# Patient Record
Sex: FEMALE | Race: WHITE | NOT HISPANIC OR LATINO | Employment: OTHER | URBAN - METROPOLITAN AREA
[De-identification: names, ages, dates, MRNs, and addresses within clinical notes are randomized per-mention and may not be internally consistent; named-entity substitution may affect disease eponyms.]

---

## 2017-02-26 ENCOUNTER — CONVERSION ENCOUNTER (OUTPATIENT)
Dept: SURGERY | Facility: CLINIC | Age: 82
End: 2017-02-26

## 2017-03-08 ENCOUNTER — CONVERSION ENCOUNTER (OUTPATIENT)
Dept: SURGERY | Facility: CLINIC | Age: 82
End: 2017-03-08

## 2017-03-08 ENCOUNTER — HOSPITAL ENCOUNTER (OUTPATIENT)
Dept: URGENT CARE | Facility: CLINIC | Age: 82
Discharge: HOME | End: 2017-03-08
Attending: GENERAL PRACTICE | Admitting: GENERAL PRACTICE

## 2017-06-19 ENCOUNTER — CONVERSION ENCOUNTER (OUTPATIENT)
Dept: SURGERY | Facility: CLINIC | Age: 82
End: 2017-06-19

## 2017-06-30 ENCOUNTER — HOSPITAL ENCOUNTER (OUTPATIENT)
Dept: CARDIOLOGY | Facility: HOSPITAL | Age: 82
Discharge: HOME | End: 2017-06-30
Attending: INTERNAL MEDICINE | Admitting: INTERNAL MEDICINE

## 2017-07-28 ENCOUNTER — HOSPITAL ENCOUNTER (OUTPATIENT)
Dept: PREADMISSION TESTING | Facility: HOSPITAL | Age: 82
Discharge: HOME | End: 2017-07-28
Attending: INTERNAL MEDICINE | Admitting: INTERNAL MEDICINE

## 2017-07-28 LAB
ANION GAP SERPL CALC-SCNC: 15.3 MMOL/L (ref 10–20)
BASOPHILS # BLD AUTO: 0.1 10*3/UL (ref 0–0.2)
BASOPHILS NFR BLD AUTO: 1 % (ref 0–2)
BUN SERPL-MCNC: 24 MG/DL (ref 8–20)
BUN/CREAT SERPL: 30 (ref 5.4–26.2)
CALCIUM SERPL-MCNC: 9.5 MG/DL (ref 8.9–10.3)
CHLORIDE SERPL-SCNC: 105 MMOL/L (ref 101–111)
CONV CO2: 24 MMOL/L (ref 22–32)
CREAT UR-MCNC: 0.8 MG/DL (ref 0.4–1)
DIFFERENTIAL METHOD BLD: (no result)
EOSINOPHIL # BLD AUTO: 0.3 10*3/UL (ref 0–0.3)
EOSINOPHIL # BLD AUTO: 4 % (ref 0–3)
ERYTHROCYTE [DISTWIDTH] IN BLOOD BY AUTOMATED COUNT: 13.7 % (ref 11.5–14.5)
GLUCOSE SERPL-MCNC: 108 MG/DL (ref 65–99)
HCT VFR BLD AUTO: 42.5 % (ref 35–49)
HGB BLD-MCNC: 14.2 G/DL (ref 12–15)
INR PPP: 0.9
LYMPHOCYTES # BLD AUTO: 2.3 10*3/UL (ref 0.8–4.8)
LYMPHOCYTES NFR BLD AUTO: 29 % (ref 18–42)
MCH RBC QN AUTO: 31.8 PG (ref 26–32)
MCHC RBC AUTO-ENTMCNC: 33.5 G/DL (ref 32–36)
MCV RBC AUTO: 94.8 FL (ref 80–94)
MONOCYTES # BLD AUTO: 0.7 10*3/UL (ref 0.1–1.3)
MONOCYTES NFR BLD AUTO: 9 % (ref 2–11)
NEUTROPHILS # BLD AUTO: 4.4 10*3/UL (ref 2.3–8.6)
NEUTROPHILS NFR BLD AUTO: 57 % (ref 50–75)
NRBC BLD AUTO-RTO: 0 /100{WBCS}
NRBC/RBC NFR BLD MANUAL: 0 10*3/UL
PLATELET # BLD AUTO: 454 10*3/UL (ref 150–450)
PMV BLD AUTO: 6.7 FL (ref 7.4–10.4)
POTASSIUM SERPL-SCNC: 4.3 MMOL/L (ref 3.6–5.1)
PROTHROMBIN TIME: 9.7 SEC (ref 9.6–11.7)
RBC # BLD AUTO: 4.49 10*6/UL (ref 4–5.4)
SODIUM SERPL-SCNC: 140 MMOL/L (ref 136–144)
WBC # BLD AUTO: 7.7 10*3/UL (ref 4.5–11.5)

## 2017-09-01 ENCOUNTER — CONVERSION ENCOUNTER (OUTPATIENT)
Dept: SURGERY | Facility: CLINIC | Age: 82
End: 2017-09-01

## 2017-11-03 ENCOUNTER — HOSPITAL ENCOUNTER (OUTPATIENT)
Dept: LAB | Facility: HOSPITAL | Age: 82
Discharge: HOME | End: 2017-11-03
Attending: FAMILY MEDICINE | Admitting: FAMILY MEDICINE

## 2017-11-03 LAB
ALBUMIN SERPL-MCNC: 3.7 G/DL (ref 3.5–4.8)
ALBUMIN/GLOB SERPL: 1.4 {RATIO} (ref 1–1.7)
ALP SERPL-CCNC: 32 IU/L (ref 32–91)
ALT SERPL-CCNC: 25 IU/L (ref 14–54)
ANION GAP SERPL CALC-SCNC: 12.8 MMOL/L (ref 10–20)
AST SERPL-CCNC: 20 IU/L (ref 15–41)
BASOPHILS # BLD AUTO: 0 10*3/UL (ref 0–0.2)
BASOPHILS NFR BLD AUTO: 1 % (ref 0–2)
BILIRUB SERPL-MCNC: 0.8 MG/DL (ref 0.3–1.2)
BUN SERPL-MCNC: 20 MG/DL (ref 8–20)
BUN/CREAT SERPL: 25 (ref 5.4–26.2)
CALCIUM SERPL-MCNC: 8.9 MG/DL (ref 8.9–10.3)
CHLORIDE SERPL-SCNC: 102 MMOL/L (ref 101–111)
CHOLEST SERPL-MCNC: 151 MG/DL
CHOLEST/HDLC SERPL: 2.3 {RATIO}
CONV CO2: 26 MMOL/L (ref 22–32)
CONV LDL CHOLESTEROL DIRECT: 68 MG/DL (ref 0–100)
CONV MICROALBUM.,U,RANDOM: 2 MG/L
CONV TOTAL PROTEIN: 6.4 G/DL (ref 6.1–7.9)
CREAT 24H UR-MCNC: 96 MG/DL
CREAT UR-MCNC: 0.8 MG/DL (ref 0.4–1)
DIFFERENTIAL METHOD BLD: (no result)
EOSINOPHIL # BLD AUTO: 0.2 10*3/UL (ref 0–0.3)
EOSINOPHIL # BLD AUTO: 4 % (ref 0–3)
ERYTHROCYTE [DISTWIDTH] IN BLOOD BY AUTOMATED COUNT: 13.2 % (ref 11.5–14.5)
GLOBULIN UR ELPH-MCNC: 2.7 G/DL (ref 2.5–3.8)
GLUCOSE SERPL-MCNC: 119 MG/DL (ref 65–99)
HCT VFR BLD AUTO: 40 % (ref 35–49)
HDLC SERPL-MCNC: 66 MG/DL
HGB BLD-MCNC: 13.5 G/DL (ref 12–15)
LDLC/HDLC SERPL: 1 {RATIO}
LIPID INTERPRETATION: NORMAL
LYMPHOCYTES # BLD AUTO: 1.9 10*3/UL (ref 0.8–4.8)
LYMPHOCYTES NFR BLD AUTO: 35 % (ref 18–42)
MCH RBC QN AUTO: 32.4 PG (ref 26–32)
MCHC RBC AUTO-ENTMCNC: 33.8 G/DL (ref 32–36)
MCV RBC AUTO: 95.9 FL (ref 80–94)
MICROALBUMIN/CREAT UR: 2.1 UG/MG
MONOCYTES # BLD AUTO: 0.5 10*3/UL (ref 0.1–1.3)
MONOCYTES NFR BLD AUTO: 10 % (ref 2–11)
NEUTROPHILS # BLD AUTO: 2.7 10*3/UL (ref 2.3–8.6)
NEUTROPHILS NFR BLD AUTO: 50 % (ref 50–75)
NRBC BLD AUTO-RTO: 0 /100{WBCS}
NRBC/RBC NFR BLD MANUAL: 0 10*3/UL
PLATELET # BLD AUTO: 409 10*3/UL (ref 150–450)
PMV BLD AUTO: 7.1 FL (ref 7.4–10.4)
POTASSIUM SERPL-SCNC: 3.8 MMOL/L (ref 3.6–5.1)
RBC # BLD AUTO: 4.17 10*6/UL (ref 4–5.4)
SODIUM SERPL-SCNC: 137 MMOL/L (ref 136–144)
TRIGL SERPL-MCNC: 82 MG/DL
TSH SERPL-ACNC: 1.64 UIU/ML (ref 0.34–5.6)
VLDLC SERPL CALC-MCNC: 17.6 MG/DL
WBC # BLD AUTO: 5.3 10*3/UL (ref 4.5–11.5)

## 2017-11-10 ENCOUNTER — HOSPITAL ENCOUNTER (OUTPATIENT)
Dept: LAB | Facility: HOSPITAL | Age: 82
Setting detail: SPECIMEN
Discharge: HOME | End: 2017-11-10
Attending: FAMILY MEDICINE | Admitting: FAMILY MEDICINE

## 2017-11-10 LAB
BACTERIA SPEC AEROBE CULT: NORMAL
Lab: NORMAL
MICRO REPORT STATUS: NORMAL
SPECIMEN SOURCE: NORMAL

## 2018-01-05 ENCOUNTER — CONVERSION ENCOUNTER (OUTPATIENT)
Dept: SURGERY | Facility: CLINIC | Age: 83
End: 2018-01-05

## 2018-03-02 ENCOUNTER — CONVERSION ENCOUNTER (OUTPATIENT)
Dept: SURGERY | Facility: CLINIC | Age: 83
End: 2018-03-02

## 2018-04-29 ENCOUNTER — CONVERSION ENCOUNTER (OUTPATIENT)
Dept: SURGERY | Facility: CLINIC | Age: 83
End: 2018-04-29

## 2018-08-25 ENCOUNTER — HOSPITAL ENCOUNTER (OUTPATIENT)
Dept: OTHER | Facility: HOSPITAL | Age: 83
Discharge: HOME | End: 2018-08-25
Attending: INTERNAL MEDICINE | Admitting: INTERNAL MEDICINE

## 2018-08-27 ENCOUNTER — HOSPITAL ENCOUNTER (OUTPATIENT)
Dept: ONCOLOGY | Facility: CLINIC | Age: 83
Setting detail: INFUSION SERIES
Discharge: HOME | End: 2018-08-27
Attending: INTERNAL MEDICINE | Admitting: INTERNAL MEDICINE

## 2018-08-27 ENCOUNTER — HOSPITAL ENCOUNTER (OUTPATIENT)
Dept: ONCOLOGY | Facility: HOSPITAL | Age: 83
Discharge: HOME | End: 2018-08-27
Attending: INTERNAL MEDICINE | Admitting: INTERNAL MEDICINE

## 2018-08-27 ENCOUNTER — CLINICAL SUPPORT (OUTPATIENT)
Dept: ONCOLOGY | Facility: HOSPITAL | Age: 83
End: 2018-08-27

## 2018-08-27 LAB
ALBUMIN SERPL-MCNC: 3.9 G/DL (ref 3.5–4.8)
ALBUMIN/GLOB SERPL: 1.3 {RATIO} (ref 1–1.7)
ALP SERPL-CCNC: 45 IU/L (ref 32–91)
ALT SERPL-CCNC: 19 IU/L (ref 14–54)
ANION GAP SERPL CALC-SCNC: 15 MMOL/L (ref 10–20)
AST SERPL-CCNC: 19 IU/L (ref 15–41)
BILIRUB SERPL-MCNC: 0.5 MG/DL (ref 0.3–1.2)
BUN SERPL-MCNC: 20 MG/DL (ref 8–20)
BUN/CREAT SERPL: 22.2 (ref 5.4–26.2)
CALCIUM SERPL-MCNC: 9.7 MG/DL (ref 8.9–10.3)
CHLORIDE SERPL-SCNC: 102 MMOL/L (ref 101–111)
CONV CO2: 27 MMOL/L (ref 22–32)
CONV TOTAL PROTEIN: 6.8 G/DL (ref 6.1–7.9)
CREAT UR-MCNC: 0.9 MG/DL (ref 0.4–1)
GLOBULIN UR ELPH-MCNC: 2.9 G/DL (ref 2.5–3.8)
GLUCOSE SERPL-MCNC: 101 MG/DL (ref 65–99)
POTASSIUM SERPL-SCNC: 4 MMOL/L (ref 3.6–5.1)
SODIUM SERPL-SCNC: 140 MMOL/L (ref 136–144)

## 2018-08-27 NOTE — PROGRESS NOTES
PATIENTS ONCOLOGY RECORD LOCATED IN [facility]      Subjective     Name:  PORTIA JONES     Date:  2018  Address:  [facility] [address] [place] [address]  Home: [phone]  :  1935 AGE:  83 y.o.        RECORDS OBTAINED:  Patients Oncology Record is located in [facility]

## 2018-08-28 ENCOUNTER — CONVERSION ENCOUNTER (OUTPATIENT)
Dept: SURGERY | Facility: CLINIC | Age: 83
End: 2018-08-28

## 2018-08-29 ENCOUNTER — HOSPITAL ENCOUNTER (OUTPATIENT)
Dept: PREADMISSION TESTING | Facility: HOSPITAL | Age: 83
Discharge: HOME | End: 2018-08-29
Attending: THORACIC SURGERY (CARDIOTHORACIC VASCULAR SURGERY) | Admitting: THORACIC SURGERY (CARDIOTHORACIC VASCULAR SURGERY)

## 2018-08-29 LAB
ABO + RH BLD: NORMAL
ALBUMIN SERPL-MCNC: 3.7 G/DL (ref 3.5–4.8)
ALBUMIN/GLOB SERPL: 1.3 {RATIO} (ref 1–1.7)
ALP SERPL-CCNC: 44 IU/L (ref 32–91)
ALT SERPL-CCNC: 24 IU/L (ref 14–54)
ANION GAP SERPL CALC-SCNC: 14.1 MMOL/L (ref 10–20)
APTT BLD: 20.6 SEC (ref 24–31)
ARMBAND: NORMAL
AST SERPL-CCNC: 20 IU/L (ref 15–41)
BASOPHILS # BLD AUTO: 0.1 10*3/UL (ref 0–0.2)
BASOPHILS NFR BLD AUTO: 1 % (ref 0–2)
BILIRUB SERPL-MCNC: 0.7 MG/DL (ref 0.3–1.2)
BLD COMPONENT TYPE: NORMAL
BLD GP AB SCN SERPL QL: NEGATIVE
BPU ID: NORMAL
BPU ID: NORMAL
BUN SERPL-MCNC: 19 MG/DL (ref 8–20)
BUN/CREAT SERPL: 23.8 (ref 5.4–26.2)
CALCIUM SERPL-MCNC: 9.8 MG/DL (ref 8.9–10.3)
CHLORIDE SERPL-SCNC: 103 MMOL/L (ref 101–111)
CONV CO2: 27 MMOL/L (ref 22–32)
CONV PRODUCT 1 STATUS: NORMAL
CONV PRODUCT 1 STATUS: NORMAL
CONV TOTAL PROTEIN: 6.6 G/DL (ref 6.1–7.9)
CREAT UR-MCNC: 0.8 MG/DL (ref 0.4–1)
CROSSMATCH EXPIRATION: NORMAL
CROSSMATCH INTERPRETATION: NORMAL
CROSSMATCH INTERPRETATION: NORMAL
CROSSMATCH: NORMAL
DIFFERENTIAL METHOD BLD: (no result)
EOSINOPHIL # BLD AUTO: 0.2 10*3/UL (ref 0–0.3)
EOSINOPHIL # BLD AUTO: 3 % (ref 0–3)
ERYTHROCYTE [DISTWIDTH] IN BLOOD BY AUTOMATED COUNT: 13.8 % (ref 11.5–14.5)
GLOBULIN UR ELPH-MCNC: 2.9 G/DL (ref 2.5–3.8)
GLUCOSE SERPL-MCNC: 108 MG/DL (ref 65–99)
HCT VFR BLD AUTO: 41.2 % (ref 35–49)
HGB BLD-MCNC: 14 G/DL (ref 12–15)
INR PPP: 0.9
LYMPHOCYTES # BLD AUTO: 2.3 10*3/UL (ref 0.8–4.8)
LYMPHOCYTES NFR BLD AUTO: 27 % (ref 18–42)
MCH RBC QN AUTO: 32 PG (ref 26–32)
MCHC RBC AUTO-ENTMCNC: 33.9 G/DL (ref 32–36)
MCV RBC AUTO: 94.4 FL (ref 80–94)
MONOCYTES # BLD AUTO: 0.8 10*3/UL (ref 0.1–1.3)
MONOCYTES NFR BLD AUTO: 9 % (ref 2–11)
NEUTROPHILS # BLD AUTO: 5.2 10*3/UL (ref 2.3–8.6)
NEUTROPHILS NFR BLD AUTO: 60 % (ref 50–75)
NRBC BLD AUTO-RTO: 0 /100{WBCS}
NRBC/RBC NFR BLD MANUAL: 0 10*3/UL
NUM BPU REQUESTED: 2
PLATELET # BLD AUTO: 489 10*3/UL (ref 150–450)
PMV BLD AUTO: 6.7 FL (ref 7.4–10.4)
POTASSIUM SERPL-SCNC: 4.1 MMOL/L (ref 3.6–5.1)
PRE-HGB: 14 MG/DL
PROTHROMBIN TIME: 10.2 SEC (ref 9.6–11.7)
RBC # BLD AUTO: 4.36 10*6/UL (ref 4–5.4)
SODIUM SERPL-SCNC: 140 MMOL/L (ref 136–144)
TRANS STATUS: NORMAL
TRANS STATUS: NORMAL
UNIT DIVISION: 0
UNIT DIVISION: 0
WBC # BLD AUTO: 8.5 10*3/UL (ref 4.5–11.5)

## 2018-08-31 ENCOUNTER — CONVERSION ENCOUNTER (OUTPATIENT)
Dept: SURGERY | Facility: CLINIC | Age: 83
End: 2018-08-31

## 2018-09-12 ENCOUNTER — HOSPITAL ENCOUNTER (OUTPATIENT)
Dept: GENERAL RADIOLOGY | Facility: HOSPITAL | Age: 83
Discharge: HOME | End: 2018-09-12
Attending: THORACIC SURGERY (CARDIOTHORACIC VASCULAR SURGERY) | Admitting: THORACIC SURGERY (CARDIOTHORACIC VASCULAR SURGERY)

## 2018-09-13 ENCOUNTER — CONVERSION ENCOUNTER (OUTPATIENT)
Dept: SURGERY | Facility: CLINIC | Age: 83
End: 2018-09-13

## 2018-09-18 ENCOUNTER — HOSPITAL ENCOUNTER (OUTPATIENT)
Dept: ONCOLOGY | Facility: HOSPITAL | Age: 83
Discharge: HOME | End: 2018-09-18
Attending: INTERNAL MEDICINE | Admitting: INTERNAL MEDICINE

## 2018-09-18 ENCOUNTER — HOSPITAL ENCOUNTER (OUTPATIENT)
Dept: ONCOLOGY | Facility: CLINIC | Age: 83
Setting detail: INFUSION SERIES
Discharge: HOME | End: 2018-09-18
Attending: INTERNAL MEDICINE | Admitting: INTERNAL MEDICINE

## 2018-09-18 ENCOUNTER — CLINICAL SUPPORT (OUTPATIENT)
Dept: ONCOLOGY | Facility: HOSPITAL | Age: 83
End: 2018-09-18

## 2018-09-18 LAB — ERYTHROCYTE [SEDIMENTATION RATE] IN BLOOD BY WESTERGREN METHOD: 33 MM/HR (ref 0–30)

## 2018-09-18 NOTE — PROGRESS NOTES
PATIENTS ONCOLOGY RECORD LOCATED IN [facility]      Subjective     Name:  PORTIA JONES     Date:  2018  Address:  [facility] [address] [place] [address]  Home: [phone]  :  1935 AGE:  83 y.o.        RECORDS OBTAINED:  Patients Oncology Record is located in [facility]

## 2018-10-01 ENCOUNTER — CONVERSION ENCOUNTER (OUTPATIENT)
Dept: SURGERY | Facility: CLINIC | Age: 83
End: 2018-10-01

## 2018-10-01 ENCOUNTER — HOSPITAL ENCOUNTER (OUTPATIENT)
Dept: URGENT CARE | Facility: CLINIC | Age: 83
Setting detail: SPECIMEN
Discharge: HOME | End: 2018-10-01
Attending: FAMILY MEDICINE | Admitting: FAMILY MEDICINE

## 2018-10-01 LAB
AZTREONAM SUSC ISLT: ABNORMAL
BACTERIA ISLT: ABNORMAL
BACTERIA SPEC AEROBE CULT: ABNORMAL
CEFAZOLIN SUSC ISLT: ABNORMAL
CEFEPIME SUSC ISLT: ABNORMAL
CEFTRIAXONE SUSC ISLT: ABNORMAL
CIPROFLOXACIN SUSC ISLT: ABNORMAL
COLONY COUNT: ABNORMAL
LEVOFLOXACIN SUSC ISLT: ABNORMAL
Lab: ABNORMAL
MEROPENEM SUSC ISLT: ABNORMAL
MICRO REPORT STATUS: ABNORMAL
NITROFURANTOIN SUSC ISLT: ABNORMAL
PIP+TAZO SUSC ISLT: ABNORMAL
SPECIMEN SOURCE: ABNORMAL
SUSC METH SPEC: ABNORMAL
TETRACYCLINE SUSC ISLT: ABNORMAL
TOBRAMYCIN SUSC ISLT: ABNORMAL
TRIMETHOPRIM/SULFA: ABNORMAL

## 2018-10-11 ENCOUNTER — HOSPITAL ENCOUNTER (OUTPATIENT)
Dept: LAB | Facility: HOSPITAL | Age: 83
Setting detail: SPECIMEN
Discharge: HOME | End: 2018-10-11
Attending: FAMILY MEDICINE | Admitting: FAMILY MEDICINE

## 2018-10-11 LAB
BACTERIA SPEC AEROBE CULT: NORMAL
Lab: NORMAL
MICRO REPORT STATUS: NORMAL
SPECIMEN SOURCE: NORMAL

## 2018-11-01 ENCOUNTER — HOSPITAL ENCOUNTER (OUTPATIENT)
Dept: LAB | Facility: HOSPITAL | Age: 83
Setting detail: SPECIMEN
Discharge: HOME | End: 2018-11-01
Attending: FAMILY MEDICINE | Admitting: FAMILY MEDICINE

## 2018-11-01 LAB
BACTERIA SPEC AEROBE CULT: NORMAL
Lab: NORMAL
MICRO REPORT STATUS: NORMAL
SPECIMEN SOURCE: NORMAL

## 2018-11-14 ENCOUNTER — HOSPITAL ENCOUNTER (OUTPATIENT)
Dept: LAB | Facility: HOSPITAL | Age: 83
Setting detail: SPECIMEN
Discharge: HOME | End: 2018-11-14
Attending: FAMILY MEDICINE | Admitting: FAMILY MEDICINE

## 2018-11-14 LAB
AMPICILLIN SUSC ISLT: ABNORMAL
AZTREONAM SUSC ISLT: ABNORMAL
BACTERIA ISLT: ABNORMAL
BACTERIA SPEC AEROBE CULT: ABNORMAL
CEFAZOLIN SUSC ISLT: ABNORMAL
CEFEPIME SUSC ISLT: ABNORMAL
CEFTRIAXONE SUSC ISLT: ABNORMAL
CIPROFLOXACIN SUSC ISLT: ABNORMAL
COLONY COUNT: ABNORMAL
LEVOFLOXACIN SUSC ISLT: ABNORMAL
Lab: ABNORMAL
MEROPENEM SUSC ISLT: ABNORMAL
MICRO REPORT STATUS: ABNORMAL
NITROFURANTOIN SUSC ISLT: ABNORMAL
PIP+TAZO SUSC ISLT: ABNORMAL
SPECIMEN SOURCE: ABNORMAL
SUSC METH SPEC: ABNORMAL
TETRACYCLINE SUSC ISLT: ABNORMAL
TOBRAMYCIN SUSC ISLT: ABNORMAL
TRIMETHOPRIM/SULFA: ABNORMAL

## 2019-02-08 ENCOUNTER — CONVERSION ENCOUNTER (OUTPATIENT)
Dept: SURGERY | Facility: CLINIC | Age: 84
End: 2019-02-08

## 2019-02-21 ENCOUNTER — HOSPITAL ENCOUNTER (OUTPATIENT)
Dept: LAB | Facility: HOSPITAL | Age: 84
Discharge: HOME | End: 2019-02-21
Attending: INTERNAL MEDICINE | Admitting: INTERNAL MEDICINE

## 2019-02-21 LAB
ALBUMIN SERPL-MCNC: 3.5 G/DL (ref 3.5–4.8)
ALBUMIN/GLOB SERPL: 1.2 {RATIO} (ref 1–1.7)
ALP SERPL-CCNC: 54 IU/L (ref 32–91)
ALT SERPL-CCNC: 19 IU/L (ref 14–54)
ANION GAP SERPL CALC-SCNC: 16.9 MMOL/L (ref 10–20)
AST SERPL-CCNC: 17 IU/L (ref 15–41)
BILIRUB SERPL-MCNC: 0.7 MG/DL (ref 0.3–1.2)
BUN SERPL-MCNC: 19 MG/DL (ref 8–20)
BUN/CREAT SERPL: 23.8 (ref 5.4–26.2)
CALCIUM SERPL-MCNC: 9.2 MG/DL (ref 8.9–10.3)
CHLORIDE SERPL-SCNC: 99 MMOL/L (ref 101–111)
CHOLEST SERPL-MCNC: 174 MG/DL
CHOLEST/HDLC SERPL: 2.8 {RATIO}
CK SERPL-CCNC: 67 IU/L (ref 38–234)
CONV CO2: 26 MMOL/L (ref 22–32)
CONV LDL CHOLESTEROL DIRECT: 91 MG/DL (ref 0–100)
CONV TOTAL PROTEIN: 6.4 G/DL (ref 6.1–7.9)
CREAT UR-MCNC: 0.8 MG/DL (ref 0.4–1)
GLOBULIN UR ELPH-MCNC: 2.9 G/DL (ref 2.5–3.8)
GLUCOSE SERPL-MCNC: 125 MG/DL (ref 65–99)
HDLC SERPL-MCNC: 63 MG/DL
LDLC/HDLC SERPL: 1.4 {RATIO}
LIPID INTERPRETATION: NORMAL
POTASSIUM SERPL-SCNC: 3.9 MMOL/L (ref 3.6–5.1)
SODIUM SERPL-SCNC: 138 MMOL/L (ref 136–144)
TRIGL SERPL-MCNC: 82 MG/DL
VLDLC SERPL CALC-MCNC: 19.7 MG/DL

## 2019-03-01 ENCOUNTER — CONVERSION ENCOUNTER (OUTPATIENT)
Dept: SURGERY | Facility: CLINIC | Age: 84
End: 2019-03-01

## 2019-03-26 ENCOUNTER — CONVERSION ENCOUNTER (OUTPATIENT)
Dept: SURGERY | Facility: CLINIC | Age: 84
End: 2019-03-26

## 2019-06-04 VITALS
BODY MASS INDEX: 29.23 KG/M2 | DIASTOLIC BLOOD PRESSURE: 85 MMHG | WEIGHT: 164 LBS | WEIGHT: 169.2 LBS | HEIGHT: 63 IN | WEIGHT: 163 LBS | HEART RATE: 93 BPM | SYSTOLIC BLOOD PRESSURE: 135 MMHG | OXYGEN SATURATION: 97 % | HEIGHT: 63 IN | DIASTOLIC BLOOD PRESSURE: 78 MMHG | SYSTOLIC BLOOD PRESSURE: 143 MMHG | HEIGHT: 63 IN | BODY MASS INDEX: 28.88 KG/M2 | DIASTOLIC BLOOD PRESSURE: 78 MMHG | HEART RATE: 108 BPM | SYSTOLIC BLOOD PRESSURE: 168 MMHG | BODY MASS INDEX: 29.23 KG/M2 | BODY MASS INDEX: 29.06 KG/M2 | WEIGHT: 165 LBS | WEIGHT: 161 LBS | DIASTOLIC BLOOD PRESSURE: 77 MMHG | OXYGEN SATURATION: 97 % | BODY MASS INDEX: 29.32 KG/M2 | BODY MASS INDEX: 29.36 KG/M2 | RESPIRATION RATE: 24 BRPM | WEIGHT: 164 LBS | SYSTOLIC BLOOD PRESSURE: 178 MMHG | HEART RATE: 73 BPM | RESPIRATION RATE: 16 BRPM | WEIGHT: 165.5 LBS | HEIGHT: 63 IN | DIASTOLIC BLOOD PRESSURE: 80 MMHG | OXYGEN SATURATION: 99 % | OXYGEN SATURATION: 96 % | DIASTOLIC BLOOD PRESSURE: 77 MMHG | SYSTOLIC BLOOD PRESSURE: 153 MMHG | HEIGHT: 63 IN | BODY MASS INDEX: 29.84 KG/M2 | BODY MASS INDEX: 28.53 KG/M2 | HEART RATE: 116 BPM | HEART RATE: 106 BPM | SYSTOLIC BLOOD PRESSURE: 138 MMHG | RESPIRATION RATE: 20 BRPM | HEART RATE: 89 BPM | HEIGHT: 63 IN | DIASTOLIC BLOOD PRESSURE: 73 MMHG | SYSTOLIC BLOOD PRESSURE: 151 MMHG | SYSTOLIC BLOOD PRESSURE: 132 MMHG | OXYGEN SATURATION: 98 % | OXYGEN SATURATION: 97 % | OXYGEN SATURATION: 96 % | WEIGHT: 170 LBS | WEIGHT: 165 LBS | HEART RATE: 86 BPM | HEART RATE: 102 BPM | SYSTOLIC BLOOD PRESSURE: 179 MMHG | OXYGEN SATURATION: 97 % | HEART RATE: 82 BPM | SYSTOLIC BLOOD PRESSURE: 185 MMHG | OXYGEN SATURATION: 96 % | BODY MASS INDEX: 29.05 KG/M2 | OXYGEN SATURATION: 98 % | DIASTOLIC BLOOD PRESSURE: 78 MMHG | DIASTOLIC BLOOD PRESSURE: 79 MMHG | HEART RATE: 97 BPM | OXYGEN SATURATION: 97 % | DIASTOLIC BLOOD PRESSURE: 81 MMHG | WEIGHT: 165.75 LBS | SYSTOLIC BLOOD PRESSURE: 158 MMHG | WEIGHT: 168.44 LBS | SYSTOLIC BLOOD PRESSURE: 135 MMHG | RESPIRATION RATE: 16 BRPM | OXYGEN SATURATION: 98 % | WEIGHT: 166.5 LBS | DIASTOLIC BLOOD PRESSURE: 69 MMHG | DIASTOLIC BLOOD PRESSURE: 75 MMHG | HEART RATE: 107 BPM | WEIGHT: 169 LBS | WEIGHT: 165.2 LBS | DIASTOLIC BLOOD PRESSURE: 69 MMHG | HEART RATE: 83 BPM | HEART RATE: 93 BPM | SYSTOLIC BLOOD PRESSURE: 137 MMHG | DIASTOLIC BLOOD PRESSURE: 77 MMHG | BODY MASS INDEX: 29.49 KG/M2 | OXYGEN SATURATION: 98 % | HEART RATE: 103 BPM | SYSTOLIC BLOOD PRESSURE: 132 MMHG

## 2019-09-26 ENCOUNTER — OFFICE VISIT (OUTPATIENT)
Dept: SURGERY | Facility: CLINIC | Age: 84
End: 2019-09-26

## 2019-09-26 VITALS
DIASTOLIC BLOOD PRESSURE: 70 MMHG | SYSTOLIC BLOOD PRESSURE: 133 MMHG | TEMPERATURE: 97.8 F | WEIGHT: 166 LBS | OXYGEN SATURATION: 98 % | HEART RATE: 87 BPM | BODY MASS INDEX: 28.94 KG/M2

## 2019-09-26 DIAGNOSIS — C34.12 PRIMARY MALIGNANT NEOPLASM OF LEFT UPPER LOBE OF LUNG (HCC): Primary | ICD-10-CM

## 2019-09-26 PROBLEM — Z83.3 FAMILY HISTORY OF DIABETES MELLITUS: Status: ACTIVE | Noted: 2019-09-26

## 2019-09-26 PROBLEM — I47.1 PAROXYSMAL SUPRAVENTRICULAR TACHYCARDIA: Status: ACTIVE | Noted: 2019-03-01

## 2019-09-26 PROBLEM — D49.89 THYMOMA: Status: ACTIVE | Noted: 2018-09-13

## 2019-09-26 PROBLEM — N76.0 VAGINITIS: Status: ACTIVE | Noted: 2018-01-05

## 2019-09-26 PROBLEM — J45.909 ASTHMA: Status: ACTIVE | Noted: 2019-09-26

## 2019-09-26 PROBLEM — I47.10 PAROXYSMAL SUPRAVENTRICULAR TACHYCARDIA: Status: ACTIVE | Noted: 2019-03-01

## 2019-09-26 PROBLEM — E78.5 HYPERLIPIDEMIA: Status: ACTIVE | Noted: 2019-09-26

## 2019-09-26 PROBLEM — M19.90 ARTHRITIS: Status: ACTIVE | Noted: 2018-08-28

## 2019-09-26 PROBLEM — R00.1 BRADYCARDIA: Status: ACTIVE | Noted: 2019-03-01

## 2019-09-26 PROCEDURE — 99213 OFFICE O/P EST LOW 20 MIN: CPT | Performed by: THORACIC SURGERY (CARDIOTHORACIC VASCULAR SURGERY)

## 2019-09-26 RX ORDER — OMEGA-3 FATTY ACIDS/FISH OIL 684-1200MG
CAPSULE,DELAYED RELEASE (ENTERIC COATED) ORAL
COMMUNITY
Start: 2014-06-22 | End: 2021-05-10

## 2019-09-26 RX ORDER — LEVOCETIRIZINE DIHYDROCHLORIDE 5 MG/1
TABLET, FILM COATED ORAL
COMMUNITY
Start: 2018-03-02 | End: 2021-05-10

## 2019-09-26 RX ORDER — CONJUGATED ESTROGENS 0.62 MG/G
CREAM VAGINAL
COMMUNITY
Start: 2019-07-19

## 2019-09-26 RX ORDER — MULTIVITAMIN
TABLET ORAL
COMMUNITY
Start: 2014-06-22

## 2019-09-26 NOTE — PROGRESS NOTES
Thoracic surgery progress note  Chief complaint follow-up of thymoma resection.  The patient is 1 year out from a thymectomy performed thoracoscopically.  She returns with a follow-up CT scan performed at [facility] radiology on September 13.  I personally examined the CT scan and reviewed the radiology report.  There is no evidence of recurrent disease.  Patient feels completely well.  She has no symptoms of myasthenia gravis no double vision no proximal muscle weakness no dysphagia.  Complete review of systems was performed.  All systems are negative.  Physical examination there is no supraclavicular cervical lymphadenopathy thoracoscopic wounds have healed well.  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

## 2019-10-07 ENCOUNTER — LAB (OUTPATIENT)
Dept: LAB | Facility: HOSPITAL | Age: 84
End: 2019-10-07

## 2019-10-07 ENCOUNTER — TRANSCRIBE ORDERS (OUTPATIENT)
Dept: ADMINISTRATIVE | Facility: HOSPITAL | Age: 84
End: 2019-10-07

## 2019-10-07 DIAGNOSIS — M85.80 SENILE OSTEOPENIA: ICD-10-CM

## 2019-10-07 DIAGNOSIS — J45.909 ASTHMA, CURRENTLY ACTIVE: ICD-10-CM

## 2019-10-07 DIAGNOSIS — M19.90 ARTHRITIS: ICD-10-CM

## 2019-10-07 DIAGNOSIS — E03.9 HYPOTHYROIDISM, UNSPECIFIED TYPE: ICD-10-CM

## 2019-10-07 DIAGNOSIS — J44.9 OBSTRUCTIVE CHRONIC BRONCHITIS WITHOUT EXACERBATION (HCC): ICD-10-CM

## 2019-10-07 DIAGNOSIS — E11.69 TYPE 2 DIABETES MELLITUS WITH OTHER SPECIFIED COMPLICATION, WITHOUT LONG-TERM CURRENT USE OF INSULIN (HCC): ICD-10-CM

## 2019-10-07 DIAGNOSIS — E78.81 LIPOID DERMATO-ARTHRITIS: ICD-10-CM

## 2019-10-07 DIAGNOSIS — C34.90 MALIGNANT NEOPLASM OF BRONCHUS AND LUNG (HCC): Primary | ICD-10-CM

## 2019-10-07 DIAGNOSIS — C34.90 MALIGNANT NEOPLASM OF BRONCHUS AND LUNG (HCC): ICD-10-CM

## 2019-10-07 LAB
ALBUMIN SERPL-MCNC: 3.8 G/DL (ref 3.5–4.8)
ALBUMIN UR-MCNC: 6 MG/L
ALBUMIN/GLOB SERPL: 1.4 G/DL (ref 1–1.7)
ALP SERPL-CCNC: 49 U/L (ref 32–91)
ALT SERPL W P-5'-P-CCNC: 16 U/L (ref 14–54)
ANION GAP SERPL CALCULATED.3IONS-SCNC: 15 MMOL/L (ref 5–15)
ARTICHOKE IGE QN: 91 MG/DL (ref 0–100)
AST SERPL-CCNC: 15 U/L (ref 15–41)
BILIRUB SERPL-MCNC: 0.7 MG/DL (ref 0.3–1.2)
BUN BLD-MCNC: 21 MG/DL (ref 8–20)
BUN/CREAT SERPL: 23.3 (ref 5.4–26.2)
CALCIUM SPEC-SCNC: 8.4 MG/DL (ref 8.9–10.3)
CHLORIDE SERPL-SCNC: 102 MMOL/L (ref 101–111)
CHOLEST SERPL-MCNC: 171 MG/DL
CO2 SERPL-SCNC: 26 MMOL/L (ref 22–32)
CREAT BLD-MCNC: 0.9 MG/DL (ref 0.4–1)
DEPRECATED RDW RBC AUTO: 49 FL (ref 37–54)
ERYTHROCYTE [DISTWIDTH] IN BLOOD BY AUTOMATED COUNT: 14.8 % (ref 12.3–15.4)
GFR SERPL CREATININE-BSD FRML MDRD: 60 ML/MIN/1.73
GLOBULIN UR ELPH-MCNC: 2.8 GM/DL (ref 2.5–3.8)
GLUCOSE BLD-MCNC: 118 MG/DL (ref 65–99)
HCT VFR BLD AUTO: 40 % (ref 34–46.6)
HDLC SERPL QL: 2.59
HDLC SERPL-MCNC: 66 MG/DL
HGB BLD-MCNC: 13.6 G/DL (ref 12–15.9)
LDLC/HDLC SERPL: 1.41 {RATIO}
MCH RBC QN AUTO: 31.7 PG (ref 26.6–33)
MCHC RBC AUTO-ENTMCNC: 34.1 G/DL (ref 31.5–35.7)
MCV RBC AUTO: 93.1 FL (ref 79–97)
PLATELET # BLD AUTO: 466 10*3/MM3 (ref 140–450)
PMV BLD AUTO: 6.3 FL (ref 6–12)
POTASSIUM BLD-SCNC: 4 MMOL/L (ref 3.6–5.1)
PROT SERPL-MCNC: 6.6 G/DL (ref 6.1–7.9)
RBC # BLD AUTO: 4.3 10*6/MM3 (ref 3.77–5.28)
SODIUM BLD-SCNC: 139 MMOL/L (ref 136–144)
TRIGL SERPL-MCNC: 59 MG/DL
TSH SERPL DL<=0.05 MIU/L-ACNC: 1.66 UIU/ML (ref 0.34–5.6)
VLDLC SERPL-MCNC: 11.8 MG/DL
WBC NRBC COR # BLD: 8.5 10*3/MM3 (ref 3.4–10.8)

## 2019-10-07 PROCEDURE — 82043 UR ALBUMIN QUANTITATIVE: CPT

## 2019-10-07 PROCEDURE — 36415 COLL VENOUS BLD VENIPUNCTURE: CPT

## 2019-10-07 PROCEDURE — 84443 ASSAY THYROID STIM HORMONE: CPT

## 2019-10-07 PROCEDURE — 80053 COMPREHEN METABOLIC PANEL: CPT

## 2019-10-07 PROCEDURE — 80061 LIPID PANEL: CPT

## 2019-10-07 PROCEDURE — 85027 COMPLETE CBC AUTOMATED: CPT

## 2019-10-19 ENCOUNTER — TELEPHONE (OUTPATIENT)
Dept: URGENT CARE | Facility: CLINIC | Age: 84
End: 2019-10-19

## 2019-10-25 RX ORDER — ISOSORBIDE MONONITRATE 30 MG/1
TABLET, EXTENDED RELEASE ORAL
Qty: 90 TABLET | Refills: 1 | Status: SHIPPED | OUTPATIENT
Start: 2019-10-25 | End: 2020-04-20

## 2020-02-03 ENCOUNTER — TRANSCRIBE ORDERS (OUTPATIENT)
Dept: ADMINISTRATIVE | Facility: HOSPITAL | Age: 85
End: 2020-02-03

## 2020-02-03 ENCOUNTER — LAB (OUTPATIENT)
Dept: LAB | Facility: HOSPITAL | Age: 85
End: 2020-02-03

## 2020-02-03 DIAGNOSIS — E03.9 MYXEDEMA HEART DISEASE: ICD-10-CM

## 2020-02-03 DIAGNOSIS — E78.81 LIPOID DERMATOARTHRITIS: ICD-10-CM

## 2020-02-03 DIAGNOSIS — M65.9 SAPHO SYNDROME (HCC): ICD-10-CM

## 2020-02-03 DIAGNOSIS — M86.9 SAPHO SYNDROME (HCC): ICD-10-CM

## 2020-02-03 DIAGNOSIS — E11.9 DIABETES MELLITUS WITHOUT COMPLICATION (HCC): ICD-10-CM

## 2020-02-03 DIAGNOSIS — M85.80 SAPHO SYNDROME (HCC): ICD-10-CM

## 2020-02-03 DIAGNOSIS — I51.9 MYXEDEMA HEART DISEASE: ICD-10-CM

## 2020-02-03 DIAGNOSIS — L70.9 SAPHO SYNDROME (HCC): ICD-10-CM

## 2020-02-03 DIAGNOSIS — I51.9 MYXEDEMA HEART DISEASE: Primary | ICD-10-CM

## 2020-02-03 DIAGNOSIS — L40.3 SAPHO SYNDROME (HCC): ICD-10-CM

## 2020-02-03 DIAGNOSIS — J44.9 OBSTRUCTIVE CHRONIC BRONCHITIS WITHOUT EXACERBATION (HCC): ICD-10-CM

## 2020-02-03 DIAGNOSIS — E03.9 MYXEDEMA HEART DISEASE: Primary | ICD-10-CM

## 2020-02-03 LAB
ALBUMIN SERPL-MCNC: 4 G/DL (ref 3.5–5.2)
ALBUMIN UR-MCNC: <1.2 MG/DL
ALBUMIN/GLOB SERPL: 1.7 G/DL
ALP SERPL-CCNC: 47 U/L (ref 39–117)
ALT SERPL W P-5'-P-CCNC: 14 U/L (ref 1–33)
ANION GAP SERPL CALCULATED.3IONS-SCNC: 12.6 MMOL/L (ref 5–15)
AST SERPL-CCNC: 14 U/L (ref 1–32)
BILIRUB SERPL-MCNC: 0.4 MG/DL (ref 0.2–1.2)
BUN BLD-MCNC: 16 MG/DL (ref 8–23)
BUN/CREAT SERPL: 21.9 (ref 7–25)
CALCIUM SPEC-SCNC: 9.1 MG/DL (ref 8.6–10.5)
CHLORIDE SERPL-SCNC: 101 MMOL/L (ref 98–107)
CHOLEST SERPL-MCNC: 172 MG/DL (ref 0–200)
CO2 SERPL-SCNC: 26.4 MMOL/L (ref 22–29)
CREAT BLD-MCNC: 0.73 MG/DL (ref 0.57–1)
CREAT UR-MCNC: 152.1 MG/DL
DEPRECATED RDW RBC AUTO: 44.2 FL (ref 37–54)
ERYTHROCYTE [DISTWIDTH] IN BLOOD BY AUTOMATED COUNT: 12.5 % (ref 12.3–15.4)
GFR SERPL CREATININE-BSD FRML MDRD: 76 ML/MIN/1.73
GLOBULIN UR ELPH-MCNC: 2.3 GM/DL
GLUCOSE BLD-MCNC: 103 MG/DL (ref 65–99)
HCT VFR BLD AUTO: 39.5 % (ref 34–46.6)
HDLC SERPL-MCNC: 71 MG/DL (ref 40–60)
HGB BLD-MCNC: 13.1 G/DL (ref 12–15.9)
LDLC SERPL CALC-MCNC: 86 MG/DL (ref 0–100)
LDLC/HDLC SERPL: 1.21 {RATIO}
MCH RBC QN AUTO: 31.6 PG (ref 26.6–33)
MCHC RBC AUTO-ENTMCNC: 33.2 G/DL (ref 31.5–35.7)
MCV RBC AUTO: 95.2 FL (ref 79–97)
MICROALBUMIN/CREAT UR: NORMAL MG/G{CREAT}
PLATELET # BLD AUTO: 451 10*3/MM3 (ref 140–450)
PMV BLD AUTO: 8.7 FL (ref 6–12)
POTASSIUM BLD-SCNC: 4.3 MMOL/L (ref 3.5–5.2)
PROT SERPL-MCNC: 6.3 G/DL (ref 6–8.5)
RBC # BLD AUTO: 4.15 10*6/MM3 (ref 3.77–5.28)
SODIUM BLD-SCNC: 140 MMOL/L (ref 136–145)
TRIGL SERPL-MCNC: 76 MG/DL (ref 0–150)
TSH SERPL DL<=0.05 MIU/L-ACNC: 2.15 UIU/ML (ref 0.27–4.2)
VLDLC SERPL-MCNC: 15.2 MG/DL (ref 5–40)
WBC NRBC COR # BLD: 8.45 10*3/MM3 (ref 3.4–10.8)

## 2020-02-03 PROCEDURE — 80053 COMPREHEN METABOLIC PANEL: CPT

## 2020-02-03 PROCEDURE — 80061 LIPID PANEL: CPT

## 2020-02-03 PROCEDURE — 82043 UR ALBUMIN QUANTITATIVE: CPT

## 2020-02-03 PROCEDURE — 36415 COLL VENOUS BLD VENIPUNCTURE: CPT

## 2020-02-03 PROCEDURE — 82570 ASSAY OF URINE CREATININE: CPT

## 2020-02-03 PROCEDURE — 85027 COMPLETE CBC AUTOMATED: CPT

## 2020-02-03 PROCEDURE — 84443 ASSAY THYROID STIM HORMONE: CPT

## 2020-02-25 ENCOUNTER — TELEPHONE (OUTPATIENT)
Dept: CARDIOLOGY | Facility: CLINIC | Age: 85
End: 2020-02-25

## 2020-03-12 ENCOUNTER — OFFICE VISIT (OUTPATIENT)
Dept: SURGERY | Facility: CLINIC | Age: 85
End: 2020-03-12

## 2020-03-12 VITALS
HEART RATE: 89 BPM | TEMPERATURE: 96.8 F | OXYGEN SATURATION: 98 % | WEIGHT: 168 LBS | DIASTOLIC BLOOD PRESSURE: 68 MMHG | SYSTOLIC BLOOD PRESSURE: 137 MMHG | BODY MASS INDEX: 29.77 KG/M2 | HEIGHT: 63 IN

## 2020-03-12 DIAGNOSIS — D49.89 THYMOMA: Primary | ICD-10-CM

## 2020-03-12 PROCEDURE — 99214 OFFICE O/P EST MOD 30 MIN: CPT | Performed by: THORACIC SURGERY (CARDIOTHORACIC VASCULAR SURGERY)

## 2020-03-12 NOTE — PROGRESS NOTES
Thoracic surgery progress note    Chief complaint follow-up of thymoma    The patient is a year and a half out from thymoma resection via thoracoscopy.  She returns with a follow-up CT scan dated March 3, 2020.  I personally examined the CT scan and reviewed the radiology report from [facility].  There is no evidence of recurrent cancer by my exam or the radiologist exam.    The patient is feeling well no fevers chills or night sweats no cough no hemoptysis no symptoms of myasthenia no double vision no difficulty swallowing.    Review of systems all systems reviewed all other systems are negative except for easy bruising in her skin.    She has an arthritic left hip her pain in her hip is increasing.  She walks with a cane.    On physical examination she is well-appearing and in no distress sclera anicteric neck supple peripheral perfusion good.  Patient concerned about subdermal discoloration on her forearms.  Subdermal bleeding is noted on her forearms.  Fresh bruising present.  Likely due to minor trauma.  Patient is not anticoagulated.  Patient was reassured that this is not a malignant condition.      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  New onset hip pain I think it is arthritic related.  Unlikely metastatic disease no further work-up planned.  3.  Subdermal bruising likely related to frail skin and old age.

## 2020-04-13 ENCOUNTER — TELEPHONE (OUTPATIENT)
Dept: CARDIOLOGY | Facility: CLINIC | Age: 85
End: 2020-04-13

## 2020-04-13 ENCOUNTER — OFFICE VISIT (OUTPATIENT)
Dept: CARDIOLOGY | Facility: CLINIC | Age: 85
End: 2020-04-13

## 2020-04-13 VITALS — HEIGHT: 63 IN | BODY MASS INDEX: 29.41 KG/M2 | WEIGHT: 166 LBS

## 2020-04-13 DIAGNOSIS — E78.5 DYSLIPIDEMIA: ICD-10-CM

## 2020-04-13 DIAGNOSIS — I10 ESSENTIAL HYPERTENSION: ICD-10-CM

## 2020-04-13 DIAGNOSIS — E11.9 TYPE 2 DIABETES MELLITUS WITHOUT COMPLICATION, WITHOUT LONG-TERM CURRENT USE OF INSULIN (HCC): ICD-10-CM

## 2020-04-13 DIAGNOSIS — I25.10 CAD S/P PERCUTANEOUS CORONARY ANGIOPLASTY: Primary | ICD-10-CM

## 2020-04-13 DIAGNOSIS — Z98.61 CAD S/P PERCUTANEOUS CORONARY ANGIOPLASTY: Primary | ICD-10-CM

## 2020-04-13 PROCEDURE — 99442 PR PHYS/QHP TELEPHONE EVALUATION 11-20 MIN: CPT | Performed by: INTERNAL MEDICINE

## 2020-04-13 NOTE — PROGRESS NOTES
You have chosen to receive care through a telephone visit. Do you consent to use a telephone visit for your medical care today? Yes  This visit has been rescheduled as a phone visit to comply with patient safety concerns in accordance with CDC recommendations. Total time of discussion was 15 minutes.      Subjective:     Encounter Date:04/13/2020      Patient ID: Rowan Guzman is a 84 y.o. female.    Chief Complaint :  History of Present Illness      This is a 83-year-old white female with past medical history of    # CAD, YOON to RCA 08/01/2017  #. Mitral valve prolapse  #.  diabetes, hypertension,  dyslipidemia  #.  syncope, SHAE-TACHY       presents  here for  f/u. patient states that he diabetes is well controlled hemoglobin A1c was 5.8,. had labs done 08/01/2017 which revealed cholesterol 137 triglycerides 74   HDL 54 LDL 71..Reviewed home blood pressure readings and they run between 124 and 140 systolics. patient is complaining of lower extremity edema, no aggravating or relieving factor, no associated pain in the legs or weeping. denies any chest pain   shortness of breath palpitations.         ASSESSMENT:  #   CAD, status post PCI  #.  mitral valve prolapse  #   diabetes,   #  hypertension,       PLAN:   patient is asymptomatic and fairly active   patient's PCi has been over a year ago will discontinue Plavix  Continue medical management and risk factor modification  Patient is shae-tachy syndrome will continue to monitor symptoms  Advised patient to monitor blood pressure at home, states the run normal compared to the office readings.  Follow-up with PMD for diabetes care   Will check lipid profile, CMP, CK to evaluate renal function liver function electrolytes lipids and muscle  Counseled on diet exercise risk factor modification  Patient visit today was done with a telephone visit due to coronavirus pandemic.  And spent 12 minutes with patient by myself on the telephone and my MA spent 15 minutes  preparing the patient  Reviewed labs with patient    Assessment:          Diagnosis Plan   1. CAD S/P percutaneous coronary angioplasty     2. Essential hypertension     3. Dyslipidemia     4. Type 2 diabetes mellitus without complication, without long-term current use of insulin (CMS/[facility])            Plan:         Past Medical History:  Past Medical History:   Diagnosis Date   • Asthma    • Diabetes mellitus (CMS/HCC)     type 2   • Heart disease    • Hyperlipidemia      Past Surgical History:  Past Surgical History:   Procedure Laterality Date   • ABDOMINAL SURGERY      cleaned adhesions   • APPENDECTOMY     • CARDIAC CATHETERIZATION  06/10/2015   • COLONOSCOPY     • CORONARY ANGIOPLASTY WITH STENT PLACEMENT  07/31/2017    stent to mid rca   • SKIN CANCER EXCISION      melanoma removal   • THYMECTOMY Left 08/31/2018    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      Allergies:  Allergies   Allergen Reactions   • Penicillins Rash   • Sulfa Antibiotics Diarrhea     Home Meds:  Current Meds:     Current Outpatient Medications:   •  alendronate (FOSAMAX) 70 MG tablet, , Disp: , Rfl:   •  aspirin (ASPIR-LOW) 81 MG EC tablet, Daily., Disp: , Rfl:   •  atorvastatin (LIPITOR) 40 MG tablet, , Disp: , Rfl:   •  budesonide (PULMICORT) 0.5 MG/2ML nebulizer solution, PULMICORT 0.5 MG/2ML SUSP, Disp: , Rfl:   •  gabapentin (NEURONTIN) 300 MG capsule, , Disp: , Rfl:   •  hydrOXYzine (ATARAX) 25 MG tablet, , Disp: , Rfl:   •  ipratropium-albuterol (DUO-NEB) 0.5-2.5 mg/3 ml nebulizer, DUONEB 0.5-2.5 (3) MG/3ML INHALATION SOLUTION, Disp: , Rfl:   •  isosorbide mononitrate (IMDUR) 30 MG 24 hr tablet, TAKE ONE TABLET BY MOUTH DAILY, Disp: 90 tablet, Rfl: 1  •  levocetirizine (XYZAL) 5 MG tablet, XYZAL TABLET, Disp: , Rfl:   •  montelukast (SINGULAIR) 10 MG tablet, , Disp: , Rfl:   •  Multiple Vitamin (MULTI-DAY VITAMINS) tablet, MULTI-DAY VITAMINS TABS, Disp: , Rfl:   •  PREMARIN 0.625 MG/GM vaginal cream, , Disp: ,  Rfl:   •  PROAIR  (90 Base) MCG/ACT inhaler, , Disp: , Rfl:   •  cetirizine (ZYRTEC ALLERGY) 10 MG tablet, ZYRTEC ALLERGY 10 MG TABS, Disp: , Rfl:   •  Cholecalciferol (VITAMIN D3) 125 MCG (5000 UT) capsule capsule, Take 5,000 Units by mouth Daily., Disp: , Rfl:   •  clobetasol (TEMOVATE) 0.05 % ointment, , Disp: , Rfl:   •  FA-Pyridoxine-Cyanocobalamin (FOLBIC PO), FOLBIC TABS, Disp: , Rfl:   •  fluconazole (DIFLUCAN) 100 MG tablet, , Disp: , Rfl:   •  fluconazole (DIFLUCAN) 150 MG tablet, , Disp: , Rfl:   •  metFORMIN (GLUCOPHAGE) 500 MG tablet, , Disp: , Rfl:   •  nystatin (MYCOSTATIN) 074265 UNIT/GM cream, , Disp: , Rfl:   •  Omega-3 Fatty Acids (KP OMEGA-3 FISH OIL) 1200 MG capsule delayed-release, KP OMEGA-3 FISH OIL 1200 MG CPDR, Disp: , Rfl:   •  vitamin C (ASCORBIC ACID) 500 MG tablet, VITAMIN C 500 MG TABS, Disp: , Rfl:   Social History:   Social History     Tobacco Use   • Smoking status: Former Smoker     Last attempt to quit: 1989     Years since quittin.5   • Smokeless tobacco: Never Used   Substance Use Topics   • Alcohol use: No     Frequency: Never      Family History:  Family History   Problem Relation Age of Onset   • Hyperlipidemia Mother    • Diabetes Mother    • Heart attack Mother    • Heart disease Father    • Hypertension Father         The following portions of the patient's history were reviewed and updated as appropriate: allergies, current medications, past family history, past medical history, past social history, past surgical history and problem list.      Review of Systems   Constitution: Negative for fever and malaise/fatigue.   HENT: Negative for congestion and hearing loss.    Eyes: Negative for double vision and visual disturbance.   Cardiovascular: Negative for chest pain, claudication, dyspnea on exertion, leg swelling and syncope.   Respiratory: Negative for cough and shortness of breath.    Endocrine: Negative for cold intolerance.   Skin: Negative for color  "change and rash.   Musculoskeletal: Negative for arthritis and joint pain.   Gastrointestinal: Negative for abdominal pain and heartburn.   Genitourinary: Negative for hematuria.   Neurological: Negative for excessive daytime sleepiness and dizziness.   Psychiatric/Behavioral: Negative for depression. The patient is not nervous/anxious.    All other systems reviewed and are negative.    Comprehensive review of systems were reviewed and all others review of systems were found to be negative other than HPI    Procedures       Objective:     Physical Exam  Ht 160 cm (63\")   Wt 75.3 kg (166 lb)   BMI 29.41 kg/m²     Lab Reviewed:                  "

## 2020-04-14 RX ORDER — METOPROLOL SUCCINATE 25 MG/1
25 TABLET, EXTENDED RELEASE ORAL DAILY
Qty: 30 TABLET | Refills: 3 | Status: SHIPPED | OUTPATIENT
Start: 2020-04-14 | End: 2020-08-10

## 2020-04-14 NOTE — TELEPHONE ENCOUNTER
Per Dr López. Add Metoprolol Succ 25mg QD and check BP every Sunday for the next few week and call us with readings. Spoke with patient and she voiced understanding. RX sent to [facility]

## 2020-04-15 NOTE — TELEPHONE ENCOUNTER
Pt called asking if she is suppposed to keep taking Isosorbide with the new med Metoprolol .    Told pt yes continue meds.     Completed.

## 2020-04-20 RX ORDER — ISOSORBIDE MONONITRATE 30 MG/1
TABLET, EXTENDED RELEASE ORAL
Qty: 90 TABLET | Refills: 3 | Status: SHIPPED | OUTPATIENT
Start: 2020-04-20 | End: 2021-04-13

## 2020-08-10 RX ORDER — METOPROLOL SUCCINATE 25 MG/1
TABLET, EXTENDED RELEASE ORAL
Qty: 90 TABLET | Refills: 1 | Status: SHIPPED | OUTPATIENT
Start: 2020-08-10 | End: 2021-01-25

## 2020-08-17 ENCOUNTER — TRANSCRIBE ORDERS (OUTPATIENT)
Dept: LAB | Facility: HOSPITAL | Age: 85
End: 2020-08-17

## 2020-08-17 ENCOUNTER — LAB (OUTPATIENT)
Dept: LAB | Facility: HOSPITAL | Age: 85
End: 2020-08-17

## 2020-08-17 DIAGNOSIS — E03.9 MYXEDEMA HEART DISEASE: ICD-10-CM

## 2020-08-17 DIAGNOSIS — L40.3 SAPHO SYNDROME (HCC): ICD-10-CM

## 2020-08-17 DIAGNOSIS — M86.9 SAPHO SYNDROME (HCC): ICD-10-CM

## 2020-08-17 DIAGNOSIS — M85.80 SAPHO SYNDROME (HCC): ICD-10-CM

## 2020-08-17 DIAGNOSIS — M65.9 SAPHO SYNDROME (HCC): ICD-10-CM

## 2020-08-17 DIAGNOSIS — J45.909 ALLERGIC ASTHMA WITH STATED CAUSE: ICD-10-CM

## 2020-08-17 DIAGNOSIS — E03.9 MYXEDEMA HEART DISEASE: Primary | ICD-10-CM

## 2020-08-17 DIAGNOSIS — I51.9 MYXEDEMA HEART DISEASE: ICD-10-CM

## 2020-08-17 DIAGNOSIS — M19.90 SENILE ARTHRITIS: ICD-10-CM

## 2020-08-17 DIAGNOSIS — L70.9 SAPHO SYNDROME (HCC): ICD-10-CM

## 2020-08-17 DIAGNOSIS — E78.81 LIPOID DERMATOARTHRITIS: ICD-10-CM

## 2020-08-17 DIAGNOSIS — E11.9 DIABETES MELLITUS WITHOUT COMPLICATION (HCC): ICD-10-CM

## 2020-08-17 DIAGNOSIS — I51.9 MYXEDEMA HEART DISEASE: Primary | ICD-10-CM

## 2020-08-17 LAB
ALBUMIN SERPL-MCNC: 4 G/DL (ref 3.5–5.2)
ALBUMIN/GLOB SERPL: 1.7 G/DL
ALP SERPL-CCNC: 48 U/L (ref 39–117)
ALT SERPL W P-5'-P-CCNC: 15 U/L (ref 1–33)
ANION GAP SERPL CALCULATED.3IONS-SCNC: 12.3 MMOL/L (ref 5–15)
AST SERPL-CCNC: 11 U/L (ref 1–32)
BASOPHILS # BLD AUTO: 0.1 10*3/MM3 (ref 0–0.2)
BASOPHILS NFR BLD AUTO: 1.4 % (ref 0–1.5)
BILIRUB SERPL-MCNC: 0.5 MG/DL (ref 0–1.2)
BUN SERPL-MCNC: 16 MG/DL (ref 8–23)
BUN/CREAT SERPL: 19.5 (ref 7–25)
CALCIUM SPEC-SCNC: 9.4 MG/DL (ref 8.6–10.5)
CHLORIDE SERPL-SCNC: 104 MMOL/L (ref 98–107)
CHOLEST SERPL-MCNC: 154 MG/DL (ref 0–200)
CO2 SERPL-SCNC: 25.7 MMOL/L (ref 22–29)
CREAT SERPL-MCNC: 0.82 MG/DL (ref 0.57–1)
DEPRECATED RDW RBC AUTO: 42.2 FL (ref 37–54)
EOSINOPHIL # BLD AUTO: 0.34 10*3/MM3 (ref 0–0.4)
EOSINOPHIL NFR BLD AUTO: 4.8 % (ref 0.3–6.2)
ERYTHROCYTE [DISTWIDTH] IN BLOOD BY AUTOMATED COUNT: 12.2 % (ref 12.3–15.4)
GFR SERPL CREATININE-BSD FRML MDRD: 67 ML/MIN/1.73
GLOBULIN UR ELPH-MCNC: 2.4 GM/DL
GLUCOSE SERPL-MCNC: 107 MG/DL (ref 65–99)
HCT VFR BLD AUTO: 39.2 % (ref 34–46.6)
HDLC SERPL-MCNC: 59 MG/DL (ref 40–60)
HGB BLD-MCNC: 13.2 G/DL (ref 12–15.9)
IMM GRANULOCYTES # BLD AUTO: 0.02 10*3/MM3 (ref 0–0.05)
IMM GRANULOCYTES NFR BLD AUTO: 0.3 % (ref 0–0.5)
LDLC SERPL CALC-MCNC: 76 MG/DL (ref 0–100)
LDLC/HDLC SERPL: 1.28 {RATIO}
LYMPHOCYTES # BLD AUTO: 2.38 10*3/MM3 (ref 0.7–3.1)
LYMPHOCYTES NFR BLD AUTO: 33.5 % (ref 19.6–45.3)
MCH RBC QN AUTO: 31.5 PG (ref 26.6–33)
MCHC RBC AUTO-ENTMCNC: 33.7 G/DL (ref 31.5–35.7)
MCV RBC AUTO: 93.6 FL (ref 79–97)
MONOCYTES # BLD AUTO: 0.77 10*3/MM3 (ref 0.1–0.9)
MONOCYTES NFR BLD AUTO: 10.8 % (ref 5–12)
NEUTROPHILS NFR BLD AUTO: 3.5 10*3/MM3 (ref 1.7–7)
NEUTROPHILS NFR BLD AUTO: 49.2 % (ref 42.7–76)
NRBC BLD AUTO-RTO: 0 /100 WBC (ref 0–0.2)
PLATELET # BLD AUTO: 499 10*3/MM3 (ref 140–450)
PMV BLD AUTO: 8.7 FL (ref 6–12)
POTASSIUM SERPL-SCNC: 3.9 MMOL/L (ref 3.5–5.2)
PROT SERPL-MCNC: 6.4 G/DL (ref 6–8.5)
RBC # BLD AUTO: 4.19 10*6/MM3 (ref 3.77–5.28)
SODIUM SERPL-SCNC: 142 MMOL/L (ref 136–145)
TRIGL SERPL-MCNC: 97 MG/DL (ref 0–150)
TSH SERPL DL<=0.05 MIU/L-ACNC: 2.33 UIU/ML (ref 0.27–4.2)
VLDLC SERPL-MCNC: 19.4 MG/DL (ref 5–40)
WBC # BLD AUTO: 7.11 10*3/MM3 (ref 3.4–10.8)

## 2020-08-17 PROCEDURE — 84443 ASSAY THYROID STIM HORMONE: CPT

## 2020-08-17 PROCEDURE — 36415 COLL VENOUS BLD VENIPUNCTURE: CPT

## 2020-08-17 PROCEDURE — 85025 COMPLETE CBC W/AUTO DIFF WBC: CPT

## 2020-08-17 PROCEDURE — 80053 COMPREHEN METABOLIC PANEL: CPT

## 2020-08-17 PROCEDURE — 80061 LIPID PANEL: CPT

## 2020-09-21 PROCEDURE — U0003 INFECTIOUS AGENT DETECTION BY NUCLEIC ACID (DNA OR RNA); SEVERE ACUTE RESPIRATORY SYNDROME CORONAVIRUS 2 (SARS-COV-2) (CORONAVIRUS DISEASE [COVID-19]), AMPLIFIED PROBE TECHNIQUE, MAKING USE OF HIGH THROUGHPUT TECHNOLOGIES AS DESCRIBED BY CMS-2020-01-R: HCPCS | Performed by: NURSE PRACTITIONER

## 2020-10-24 PROCEDURE — U0003 INFECTIOUS AGENT DETECTION BY NUCLEIC ACID (DNA OR RNA); SEVERE ACUTE RESPIRATORY SYNDROME CORONAVIRUS 2 (SARS-COV-2) (CORONAVIRUS DISEASE [COVID-19]), AMPLIFIED PROBE TECHNIQUE, MAKING USE OF HIGH THROUGHPUT TECHNOLOGIES AS DESCRIBED BY CMS-2020-01-R: HCPCS | Performed by: FAMILY MEDICINE

## 2021-01-25 RX ORDER — METOPROLOL SUCCINATE 25 MG/1
TABLET, EXTENDED RELEASE ORAL
Qty: 90 TABLET | Refills: 1 | Status: SHIPPED | OUTPATIENT
Start: 2021-01-25 | End: 2021-05-10 | Stop reason: SDUPTHER

## 2021-03-15 NOTE — PROGRESS NOTES
Chief Complaint  Thymoma follow up    You have chosen to receive care through a telephone visit. Do you consent to use a telephone visit for your medical care today? Yes      Subjective          Rowan Guzman presents to [facility] THORACIC SURGERY  History of Present Illness    Ms. Guzman is a pleasant 84yo female who presents for follow up s/p thymectomy with Dr. Verdugo on 08/31/18.  She has a history of thymoma, B3.  She reports she is in her usual state of health and feeling well overall.  No new complaints.  She states she quit smoking over 30 years ago and continues to remain tobacco free.    Objective   Vital Signs:   There were no vitals taken for this visit.    Physical Exam     Deferred secondary to telephone visit.    Result Review :       Data reviewed: Radiologic studies CT chest.           I personally reviewed the chest CT dated 3/4/2021 which demonstrates postoperative changes to the mediastinum, no lymphadenopathy, and no evidence of disease, including recurrence of thymoma.       Assessment and Plan {CC Problem List  Visit Diagnosis  ROS  Review (Popup)  Health Maintenance  Quality  BestPractice  Medications  SmartSets  SnapShot Encounters  Media :23}   Diagnoses and all orders for this visit:    1. Thymoma (Primary)  -     CT Chest Without Contrast; Future    2. 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 Guzman is a pleasant 85-year-old who is status post thymectomy secondary to thymoma performed on 9/26/2018.  There is no new evidence of disease on her most recent CT scan perormed on 3/4/2021.  We will plan for continued surveillance with a CT of the chest in 12 months with an in person follow-up visit to discuss the findings.  Advised continued tobacco cessation.      I spent 15 minutes caring for Rowan on this date of service. This time includes time spent by me in the following activities:preparing for the visit, reviewing tests,  performing a medically appropriate examination and/or evaluation , counseling and educating the patient/family/caregiver, ordering medications, tests, or procedures, documenting information in the medical record, independently interpreting results and communicating that information with the patient/family/caregiver and care coordination .  Dr. Arreola agrees to the plan.    Follow Up   Return in about 1 year (around 3/16/2022) for Thymoma follow-up.  Patient was given instructions and counseling regarding her condition or for health maintenance advice. Please see specific information pulled into the AVS if appropriate.

## 2021-03-16 ENCOUNTER — OFFICE VISIT (OUTPATIENT)
Dept: SURGERY | Facility: CLINIC | Age: 86
End: 2021-03-16

## 2021-03-16 DIAGNOSIS — C34.12 PRIMARY MALIGNANT NEOPLASM OF LEFT UPPER LOBE OF LUNG (HCC): ICD-10-CM

## 2021-03-16 DIAGNOSIS — D49.89 THYMOMA: Primary | ICD-10-CM

## 2021-03-16 PROCEDURE — 99442 PR PHYS/QHP TELEPHONE EVALUATION 11-20 MIN: CPT | Performed by: NURSE PRACTITIONER

## 2021-04-13 RX ORDER — ISOSORBIDE MONONITRATE 30 MG/1
TABLET, EXTENDED RELEASE ORAL
Qty: 90 TABLET | Refills: 0 | Status: SHIPPED | OUTPATIENT
Start: 2021-04-13 | End: 2021-05-10 | Stop reason: SDUPTHER

## 2021-04-19 DIAGNOSIS — I10 ESSENTIAL HYPERTENSION: ICD-10-CM

## 2021-04-19 DIAGNOSIS — R94.39 ABNORMAL CARDIOVASCULAR STRESS TEST: Primary | ICD-10-CM

## 2021-04-19 DIAGNOSIS — I25.10 CHRONIC CORONARY ARTERY DISEASE: ICD-10-CM

## 2021-04-19 DIAGNOSIS — E78.5 HYPERLIPIDEMIA, UNSPECIFIED HYPERLIPIDEMIA TYPE: ICD-10-CM

## 2021-05-04 ENCOUNTER — TRANSCRIBE ORDERS (OUTPATIENT)
Dept: LAB | Facility: HOSPITAL | Age: 86
End: 2021-05-04

## 2021-05-04 ENCOUNTER — LAB (OUTPATIENT)
Dept: LAB | Facility: HOSPITAL | Age: 86
End: 2021-05-04

## 2021-05-04 DIAGNOSIS — E13.9 DIABETES MELLITUS OF OTHER TYPE WITHOUT COMPLICATION, UNSPECIFIED WHETHER LONG TERM INSULIN USE (HCC): ICD-10-CM

## 2021-05-04 DIAGNOSIS — M19.90 SENILE ARTHRITIS: ICD-10-CM

## 2021-05-04 DIAGNOSIS — E78.81 LIPOID DERMATOARTHRITIS: ICD-10-CM

## 2021-05-04 DIAGNOSIS — J44.9 OBSTRUCTIVE CHRONIC BRONCHITIS WITHOUT EXACERBATION (HCC): ICD-10-CM

## 2021-05-04 DIAGNOSIS — I10 ESSENTIAL HYPERTENSION: ICD-10-CM

## 2021-05-04 DIAGNOSIS — E03.9 MYXEDEMA HEART DISEASE: ICD-10-CM

## 2021-05-04 DIAGNOSIS — E55.9 VITAMIN D DEFICIENCY, UNSPECIFIED: ICD-10-CM

## 2021-05-04 DIAGNOSIS — I51.9 MYXEDEMA HEART DISEASE: ICD-10-CM

## 2021-05-04 DIAGNOSIS — I25.10 CHRONIC CORONARY ARTERY DISEASE: ICD-10-CM

## 2021-05-04 DIAGNOSIS — E78.5 HYPERLIPIDEMIA, UNSPECIFIED HYPERLIPIDEMIA TYPE: ICD-10-CM

## 2021-05-04 DIAGNOSIS — E03.9 MYXEDEMA HEART DISEASE: Primary | ICD-10-CM

## 2021-05-04 DIAGNOSIS — I51.9 MYXEDEMA HEART DISEASE: Primary | ICD-10-CM

## 2021-05-04 DIAGNOSIS — R94.39 ABNORMAL CARDIOVASCULAR STRESS TEST: ICD-10-CM

## 2021-05-04 LAB
25(OH)D3 SERPL-MCNC: 20.8 NG/ML
ALBUMIN SERPL-MCNC: 4.1 G/DL (ref 3.5–5.2)
ALBUMIN UR-MCNC: <1.2 MG/DL
ALBUMIN/GLOB SERPL: 1.7 G/DL
ALP SERPL-CCNC: 52 U/L (ref 39–117)
ALT SERPL W P-5'-P-CCNC: 12 U/L (ref 1–33)
ANION GAP SERPL CALCULATED.3IONS-SCNC: 11.9 MMOL/L (ref 5–15)
AST SERPL-CCNC: 15 U/L (ref 1–32)
BASOPHILS # BLD AUTO: 0.08 10*3/MM3 (ref 0–0.2)
BASOPHILS NFR BLD AUTO: 1 % (ref 0–1.5)
BILIRUB SERPL-MCNC: 0.5 MG/DL (ref 0–1.2)
BUN SERPL-MCNC: 14 MG/DL (ref 8–23)
BUN/CREAT SERPL: 18.2 (ref 7–25)
CALCIUM SPEC-SCNC: 8.9 MG/DL (ref 8.6–10.5)
CHLORIDE SERPL-SCNC: 103 MMOL/L (ref 98–107)
CHOLEST SERPL-MCNC: 161 MG/DL (ref 0–200)
CK SERPL-CCNC: 73 U/L (ref 20–180)
CO2 SERPL-SCNC: 25.1 MMOL/L (ref 22–29)
CREAT SERPL-MCNC: 0.77 MG/DL (ref 0.57–1)
DEPRECATED RDW RBC AUTO: 42.2 FL (ref 37–54)
EOSINOPHIL # BLD AUTO: 0.4 10*3/MM3 (ref 0–0.4)
EOSINOPHIL NFR BLD AUTO: 5.1 % (ref 0.3–6.2)
ERYTHROCYTE [DISTWIDTH] IN BLOOD BY AUTOMATED COUNT: 12 % (ref 12.3–15.4)
GFR SERPL CREATININE-BSD FRML MDRD: 72 ML/MIN/1.73
GLOBULIN UR ELPH-MCNC: 2.4 GM/DL
GLUCOSE SERPL-MCNC: 113 MG/DL (ref 65–99)
HBA1C MFR BLD: 6.3 % (ref 3.5–5.6)
HCT VFR BLD AUTO: 42.5 % (ref 34–46.6)
HDLC SERPL-MCNC: 61 MG/DL (ref 40–60)
HGB BLD-MCNC: 14.1 G/DL (ref 12–15.9)
IMM GRANULOCYTES # BLD AUTO: 0.02 10*3/MM3 (ref 0–0.05)
IMM GRANULOCYTES NFR BLD AUTO: 0.3 % (ref 0–0.5)
LDLC SERPL CALC-MCNC: 81 MG/DL (ref 0–100)
LDLC/HDLC SERPL: 1.29 {RATIO}
LYMPHOCYTES # BLD AUTO: 2.86 10*3/MM3 (ref 0.7–3.1)
LYMPHOCYTES NFR BLD AUTO: 36.5 % (ref 19.6–45.3)
MCH RBC QN AUTO: 31.6 PG (ref 26.6–33)
MCHC RBC AUTO-ENTMCNC: 33.2 G/DL (ref 31.5–35.7)
MCV RBC AUTO: 95.3 FL (ref 79–97)
MONOCYTES # BLD AUTO: 0.73 10*3/MM3 (ref 0.1–0.9)
MONOCYTES NFR BLD AUTO: 9.3 % (ref 5–12)
NEUTROPHILS NFR BLD AUTO: 3.75 10*3/MM3 (ref 1.7–7)
NEUTROPHILS NFR BLD AUTO: 47.8 % (ref 42.7–76)
NRBC BLD AUTO-RTO: 0 /100 WBC (ref 0–0.2)
PLATELET # BLD AUTO: 469 10*3/MM3 (ref 140–450)
PMV BLD AUTO: 8.7 FL (ref 6–12)
POTASSIUM SERPL-SCNC: 4.6 MMOL/L (ref 3.5–5.2)
PROT SERPL-MCNC: 6.5 G/DL (ref 6–8.5)
RBC # BLD AUTO: 4.46 10*6/MM3 (ref 3.77–5.28)
SODIUM SERPL-SCNC: 140 MMOL/L (ref 136–145)
TRIGL SERPL-MCNC: 108 MG/DL (ref 0–150)
TSH SERPL DL<=0.05 MIU/L-ACNC: 1.89 UIU/ML (ref 0.27–4.2)
VLDLC SERPL-MCNC: 19 MG/DL (ref 5–40)
WBC # BLD AUTO: 7.84 10*3/MM3 (ref 3.4–10.8)

## 2021-05-04 PROCEDURE — 85025 COMPLETE CBC W/AUTO DIFF WBC: CPT

## 2021-05-04 PROCEDURE — 80053 COMPREHEN METABOLIC PANEL: CPT

## 2021-05-04 PROCEDURE — 84443 ASSAY THYROID STIM HORMONE: CPT

## 2021-05-04 PROCEDURE — 36415 COLL VENOUS BLD VENIPUNCTURE: CPT

## 2021-05-04 PROCEDURE — 82306 VITAMIN D 25 HYDROXY: CPT

## 2021-05-04 PROCEDURE — 82550 ASSAY OF CK (CPK): CPT

## 2021-05-04 PROCEDURE — 82043 UR ALBUMIN QUANTITATIVE: CPT

## 2021-05-04 PROCEDURE — 83036 HEMOGLOBIN GLYCOSYLATED A1C: CPT

## 2021-05-04 PROCEDURE — 80061 LIPID PANEL: CPT

## 2021-05-10 ENCOUNTER — OFFICE VISIT (OUTPATIENT)
Dept: CARDIOLOGY | Facility: CLINIC | Age: 86
End: 2021-05-10

## 2021-05-10 VITALS
HEART RATE: 87 BPM | HEIGHT: 63 IN | BODY MASS INDEX: 30.48 KG/M2 | OXYGEN SATURATION: 97 % | DIASTOLIC BLOOD PRESSURE: 74 MMHG | WEIGHT: 172 LBS | SYSTOLIC BLOOD PRESSURE: 123 MMHG

## 2021-05-10 DIAGNOSIS — R60.0 EDEMA LEG: Primary | ICD-10-CM

## 2021-05-10 DIAGNOSIS — I10 ESSENTIAL HYPERTENSION: ICD-10-CM

## 2021-05-10 DIAGNOSIS — E11.9 TYPE 2 DIABETES MELLITUS WITHOUT COMPLICATION, WITHOUT LONG-TERM CURRENT USE OF INSULIN (HCC): ICD-10-CM

## 2021-05-10 DIAGNOSIS — Z98.61 CAD S/P PERCUTANEOUS CORONARY ANGIOPLASTY: ICD-10-CM

## 2021-05-10 DIAGNOSIS — I25.10 CAD S/P PERCUTANEOUS CORONARY ANGIOPLASTY: ICD-10-CM

## 2021-05-10 DIAGNOSIS — E78.5 DYSLIPIDEMIA: ICD-10-CM

## 2021-05-10 PROCEDURE — 99214 OFFICE O/P EST MOD 30 MIN: CPT | Performed by: INTERNAL MEDICINE

## 2021-05-10 PROCEDURE — 93000 ELECTROCARDIOGRAM COMPLETE: CPT | Performed by: INTERNAL MEDICINE

## 2021-05-10 RX ORDER — ISOSORBIDE MONONITRATE 30 MG/1
30 TABLET, EXTENDED RELEASE ORAL DAILY
Qty: 90 TABLET | Refills: 3 | Status: SHIPPED | OUTPATIENT
Start: 2021-05-10 | End: 2022-04-20 | Stop reason: SDUPTHER

## 2021-05-10 RX ORDER — METOPROLOL SUCCINATE 25 MG/1
25 TABLET, EXTENDED RELEASE ORAL DAILY
Qty: 90 TABLET | Refills: 3 | Status: SHIPPED | OUTPATIENT
Start: 2021-05-10 | End: 2022-07-06

## 2021-05-10 NOTE — PROGRESS NOTES
Subjective:     Encounter Date:05/10/2021      Patient ID: Rowan Guzman is a 85 y.o. female.    Chief Complaint : Follow-up for CAD, PCI, mitral valve disease, hypertension, and diabetes.  Is complaining of edema  History of Present Illness      This is a 85-year-old white female with past medical history of    # CAD, YOON to RCA 08/01/2017  #. Mitral valve prolapse  #.  diabetes, hypertension,  dyslipidemia  #.  syncope, SHEA-TACHY       presents  here for  f/u.  Patient is complaining of lower extremity edema with no aggravating or relieving factors.  Apparently is very sedentary according to additional history obtained from patient's daughter who is by bedside.  Patient walks very slowly with a cane says she has issues with hips.  patient states that he diabetes is well controlled hemoglobin A1c was 5.8,. had labs done 08/01/2017 which revealed cholesterol 137 triglycerides 74, HDL 54 LDL 71.. Labs from 5/4/2021 reveal hemoglobin A1c of 6.3, normal CBC, TSH, CK, CMP except for glucose of 113, cholesterol 161 triglycerides 108 HDL 61 LDL 81  Patient's arterial blood pressure is 123/74, heart rate 87, O2 sat of 97% on room air  Patient's BMI is elevated at 30.5.         ASSESSMENT:  -Leg edema  -  CAD, status post PCI  -.  mitral valve prolapse  -   diabetes,   -  hypertension,       PLAN:  Reviewed EKG results with patient.  Patient is complaining of leg edema we will check an echocardiogram to assess LV function  I suspect patient's edema is due to venous insufficiency discussed about exercises  Continue medical management with aspirin, atorvastatin, isosorbide, metoprolol succinate as tolerated to help with CAD, hypertension, valve disease  Patient is shae-tachy syndrome will continue to monitor symptoms  Advised patient to monitor blood pressure at home.  Follow-up with PMD for diabetes care  Discussed about COVID-19 vaccination, patient already took both the doses  Reviewed elevated BMI at 30.5 counseled  on weight loss diet and exercise  We will follow-up and consider further evaluation treatment with      Assessment:         MDM     Diagnosis Plan   1. Edema leg  Adult Transthoracic Echo Complete W/ Cont if Necessary Per Protocol   2. CAD S/P percutaneous coronary angioplasty  Adult Transthoracic Echo Complete W/ Cont if Necessary Per Protocol   3. Dyslipidemia  Adult Transthoracic Echo Complete W/ Cont if Necessary Per Protocol   4. Essential hypertension  Adult Transthoracic Echo Complete W/ Cont if Necessary Per Protocol   5. Type 2 diabetes mellitus without complication, without long-term current use of insulin (CMS/HCC)  Adult Transthoracic Echo Complete W/ Cont if Necessary Per Protocol          Plan:         Past Medical History:  Past Medical History:   Diagnosis Date   • Asthma    • Diabetes mellitus (CMS/HCC)     type 2   • Heart disease    • Hyperlipidemia      Past Surgical History:  Past Surgical History:   Procedure Laterality Date   • ABDOMINAL SURGERY      cleaned adhesions   • APPENDECTOMY     • BREAST LUMPECTOMY Right    • CARDIAC CATHETERIZATION  06/10/2015   • COLONOSCOPY     • CORONARY ANGIOPLASTY WITH STENT PLACEMENT  07/31/2017    stent to mid rca   • SKIN CANCER EXCISION      melanoma removal   • THYMECTOMY Left 08/31/2018    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      Allergies:  Allergies   Allergen Reactions   • Penicillins Rash   • Sulfa Antibiotics Diarrhea     Home Meds:  Current Meds:     Current Outpatient Medications:   •  alendronate (FOSAMAX) 70 MG tablet, , Disp: , Rfl:   •  aspirin (ASPIR-LOW) 81 MG EC tablet, Daily., Disp: , Rfl:   •  atorvastatin (LIPITOR) 40 MG tablet, , Disp: , Rfl:   •  budesonide (PULMICORT) 0.5 MG/2ML nebulizer solution, PULMICORT 0.5 MG/2ML SUSP, Disp: , Rfl:   •  cetirizine (ZYRTEC ALLERGY) 10 MG tablet, ZYRTEC ALLERGY 10 MG TABS, Disp: , Rfl:   •  FA-Pyridoxine-Cyanocobalamin (FOLBIC PO), FOLBIC TABS, Disp: , Rfl:   •  gabapentin  (NEURONTIN) 300 MG capsule, , Disp: , Rfl:   •  ipratropium-albuterol (DUO-NEB) 0.5-2.5 mg/3 ml nebulizer, DUONEB 0.5-2.5 (3) MG/3ML INHALATION SOLUTION, Disp: , Rfl:   •  isosorbide mononitrate (IMDUR) 30 MG 24 hr tablet, Take 1 tablet by mouth Daily., Disp: 90 tablet, Rfl: 3  •  metoprolol succinate XL (TOPROL-XL) 25 MG 24 hr tablet, Take 1 tablet by mouth Daily., Disp: 90 tablet, Rfl: 3  •  montelukast (SINGULAIR) 10 MG tablet, , Disp: , Rfl:   •  Multiple Vitamin (MULTI-DAY VITAMINS) tablet, MULTI-DAY VITAMINS TABS, Disp: , Rfl:   •  PREMARIN 0.625 MG/GM vaginal cream, , Disp: , Rfl:   •  PROAIR  (90 Base) MCG/ACT inhaler, , Disp: , Rfl:   •  terbinafine (lamiSIL) 250 MG tablet, , Disp: , Rfl:   •  vitamin C (ASCORBIC ACID) 500 MG tablet, VITAMIN C 500 MG TABS, Disp: , Rfl:   Social History:   Social History     Tobacco Use   • Smoking status: Former Smoker     Quit date: 1989     Years since quittin.6   • Smokeless tobacco: Never Used   Substance Use Topics   • Alcohol use: No      Family History:  Family History   Problem Relation Age of Onset   • Hyperlipidemia Mother    • Diabetes Mother    • Heart attack Mother    • Heart disease Father    • Hypertension Father         The following portions of the patient's history were reviewed and updated as appropriate: allergies, current medications, past family history, past medical history, past social history, past surgical history and problem list.      Review of Systems   Cardiovascular: Positive for leg swelling. Negative for chest pain and palpitations.   Respiratory: Negative for shortness of breath.    Neurological: Negative for dizziness and numbness.     All other systems are negative      ECG 12 Lead    Date/Time: 5/10/2021 8:39 PM  Performed by: Amor López MD  Authorized by: Amor López MD   Comparison: compared with previous ECG from 3/1/2019  Comparison to previous ECG: EKG done today reviewed by me shows  "sinus rhythm at the rate of 76 bpm with no acute ST-T changes, no new change compared to EKG from 3/1/2019                 Objective:     Physical Exam  /74 (BP Location: Left arm, Patient Position: Sitting, Cuff Size: Large Adult)   Pulse 87   Ht 160 cm (63\")   Wt 78 kg (172 lb)   SpO2 97%   BMI 30.47 kg/m²   General:  Appears in no acute distress, walks with a  Eyes: Sclera is anicteric,  conjunctiva is clear   HEENT:  No JVD.  No carotid bruits  Respiratory: Respirations regular and unlabored at rest.  Clear to auscultation  Cardiovascular: S1,S2 Regular rate and rhythm. No murmur, rub or gallop auscultated.   Gastrointestinal: Abdomen nondistended, soft  Extremities: No digital clubbing or cyanosis.  Bilateral lower extremity edema seen  Skin: Color pink. Skin warm and dry to touch. No rashes  No xanthoma  Neuro: Alert and awake, no lateralizing deficits appreciated    Lab Reviewed:                  "

## 2021-05-20 ENCOUNTER — HOSPITAL ENCOUNTER (OUTPATIENT)
Dept: CARDIOLOGY | Facility: HOSPITAL | Age: 86
Discharge: HOME | End: 2021-05-20
Admitting: INTERNAL MEDICINE

## 2021-05-20 VITALS
SYSTOLIC BLOOD PRESSURE: 126 MMHG | WEIGHT: 172 LBS | BODY MASS INDEX: 30.48 KG/M2 | HEIGHT: 63 IN | DIASTOLIC BLOOD PRESSURE: 74 MMHG

## 2021-05-20 DIAGNOSIS — E78.5 DYSLIPIDEMIA: ICD-10-CM

## 2021-05-20 DIAGNOSIS — I25.10 CAD S/P PERCUTANEOUS CORONARY ANGIOPLASTY: ICD-10-CM

## 2021-05-20 DIAGNOSIS — I10 ESSENTIAL HYPERTENSION: ICD-10-CM

## 2021-05-20 DIAGNOSIS — E11.9 TYPE 2 DIABETES MELLITUS WITHOUT COMPLICATION, WITHOUT LONG-TERM CURRENT USE OF INSULIN (HCC): ICD-10-CM

## 2021-05-20 DIAGNOSIS — R60.0 EDEMA LEG: ICD-10-CM

## 2021-05-20 DIAGNOSIS — Z98.61 CAD S/P PERCUTANEOUS CORONARY ANGIOPLASTY: ICD-10-CM

## 2021-05-20 LAB
BH CV ECHO MEAS - AO MAX PG (FULL): -0.19 MMHG
BH CV ECHO MEAS - AO MAX PG: 6.3 MMHG
BH CV ECHO MEAS - AO MEAN PG (FULL): -0.34 MMHG
BH CV ECHO MEAS - AO MEAN PG: 3.1 MMHG
BH CV ECHO MEAS - AO ROOT AREA (BSA CORRECTED): 1.6
BH CV ECHO MEAS - AO ROOT AREA: 6.7 CM^2
BH CV ECHO MEAS - AO ROOT DIAM: 2.9 CM
BH CV ECHO MEAS - AO V2 MAX: 125.8 CM/SEC
BH CV ECHO MEAS - AO V2 MEAN: 80.8 CM/SEC
BH CV ECHO MEAS - AO V2 VTI: 16.8 CM
BH CV ECHO MEAS - AVA(I,A): 3.3 CM^2
BH CV ECHO MEAS - AVA(I,D): 3.3 CM^2
BH CV ECHO MEAS - AVA(V,A): 2.6 CM^2
BH CV ECHO MEAS - AVA(V,D): 2.6 CM^2
BH CV ECHO MEAS - BSA(HAYCOCK): 1.9 M^2
BH CV ECHO MEAS - BSA: 1.8 M^2
BH CV ECHO MEAS - BZI_BMI: 30.5 KILOGRAMS/M^2
BH CV ECHO MEAS - BZI_METRIC_HEIGHT: 160 CM
BH CV ECHO MEAS - BZI_METRIC_WEIGHT: 78 KG
BH CV ECHO MEAS - EDV(CUBED): 64.3 ML
BH CV ECHO MEAS - EDV(MOD-SP4): 19.6 ML
BH CV ECHO MEAS - EDV(TEICH): 70.3 ML
BH CV ECHO MEAS - EF(CUBED): 90.5 %
BH CV ECHO MEAS - EF(MOD-SP4): 66.9 %
BH CV ECHO MEAS - EF(TEICH): 85.6 %
BH CV ECHO MEAS - ESV(CUBED): 6.1 ML
BH CV ECHO MEAS - ESV(MOD-SP4): 6.5 ML
BH CV ECHO MEAS - ESV(TEICH): 10.1 ML
BH CV ECHO MEAS - FS: 54.4 %
BH CV ECHO MEAS - IVS/LVPW: 1.1
BH CV ECHO MEAS - IVSD: 0.97 CM
BH CV ECHO MEAS - LV DIASTOLIC VOL/BSA (35-75): 10.8 ML/M^2
BH CV ECHO MEAS - LV MASS(C)D: 114.8 GRAMS
BH CV ECHO MEAS - LV MASS(C)DI: 63.3 GRAMS/M^2
BH CV ECHO MEAS - LV MAX PG: 6.5 MMHG
BH CV ECHO MEAS - LV MEAN PG: 3.4 MMHG
BH CV ECHO MEAS - LV SYSTOLIC VOL/BSA (12-30): 3.6 ML/M^2
BH CV ECHO MEAS - LV V1 MAX: 127.7 CM/SEC
BH CV ECHO MEAS - LV V1 MEAN: 86.8 CM/SEC
BH CV ECHO MEAS - LV V1 VTI: 21.6 CM
BH CV ECHO MEAS - LVIDD: 4 CM
BH CV ECHO MEAS - LVIDS: 1.8 CM
BH CV ECHO MEAS - LVOT AREA: 2.6 CM^2
BH CV ECHO MEAS - LVOT DIAM: 1.8 CM
BH CV ECHO MEAS - LVPWD: 0.89 CM
BH CV ECHO MEAS - MV A MAX VEL: 102.5 CM/SEC
BH CV ECHO MEAS - MV DEC SLOPE: 214.4 CM/SEC^2
BH CV ECHO MEAS - MV DEC TIME: 0.29 SEC
BH CV ECHO MEAS - MV E MAX VEL: 62.1 CM/SEC
BH CV ECHO MEAS - MV E/A: 0.61
BH CV ECHO MEAS - MV MAX PG: 5.2 MMHG
BH CV ECHO MEAS - MV MEAN PG: 2.2 MMHG
BH CV ECHO MEAS - MV V2 MAX: 114 CM/SEC
BH CV ECHO MEAS - MV V2 MEAN: 72.1 CM/SEC
BH CV ECHO MEAS - MV V2 VTI: 20.2 CM
BH CV ECHO MEAS - MVA(VTI): 2.7 CM^2
BH CV ECHO MEAS - RAP SYSTOLE: 3 MMHG
BH CV ECHO MEAS - RVDD: 2.7 CM
BH CV ECHO MEAS - RVSP: 30.5 MMHG
BH CV ECHO MEAS - SI(AO): 61.8 ML/M^2
BH CV ECHO MEAS - SI(CUBED): 32.1 ML/M^2
BH CV ECHO MEAS - SI(LVOT): 30.6 ML/M^2
BH CV ECHO MEAS - SI(MOD-SP4): 7.2 ML/M^2
BH CV ECHO MEAS - SI(TEICH): 33.2 ML/M^2
BH CV ECHO MEAS - SV(AO): 112.1 ML
BH CV ECHO MEAS - SV(CUBED): 58.2 ML
BH CV ECHO MEAS - SV(LVOT): 55.5 ML
BH CV ECHO MEAS - SV(MOD-SP4): 13.1 ML
BH CV ECHO MEAS - SV(TEICH): 60.2 ML
BH CV ECHO MEAS - TR MAX VEL: 261.8 CM/SEC
MAXIMAL PREDICTED HEART RATE: 137 BPM
STRESS TARGET HR: 116 BPM

## 2021-05-20 PROCEDURE — 93306 TTE W/DOPPLER COMPLETE: CPT

## 2021-05-20 PROCEDURE — 93306 TTE W/DOPPLER COMPLETE: CPT | Performed by: INTERNAL MEDICINE

## 2021-05-25 ENCOUNTER — TELEPHONE (OUTPATIENT)
Dept: CARDIOLOGY | Facility: CLINIC | Age: 86
End: 2021-05-25

## 2021-05-25 NOTE — TELEPHONE ENCOUNTER
Per Dr López  Echo shows a stiff heart, nothing needs to be done right now, will continue to monitor .  Will see at next OV.

## 2021-08-02 PROCEDURE — 87086 URINE CULTURE/COLONY COUNT: CPT | Performed by: NURSE PRACTITIONER

## 2021-12-01 ENCOUNTER — TELEPHONE (OUTPATIENT)
Dept: CARDIOLOGY | Facility: CLINIC | Age: 86
End: 2021-12-01

## 2022-04-20 RX ORDER — ISOSORBIDE MONONITRATE 30 MG/1
30 TABLET, EXTENDED RELEASE ORAL DAILY
Qty: 90 TABLET | Refills: 0 | Status: SHIPPED | OUTPATIENT
Start: 2022-04-20 | End: 2022-08-09

## 2022-04-20 NOTE — TELEPHONE ENCOUNTER
Rx Refill Note  Requested Prescriptions      No prescriptions requested or ordered in this encounter      Last office visit with prescribing clinician: 5/10/2021      Next office visit with prescribing clinician: 5/10/2022            Celestina Isaac MA  04/20/22, 12:21 EDT

## 2022-07-06 RX ORDER — METOPROLOL SUCCINATE 25 MG/1
TABLET, EXTENDED RELEASE ORAL
Qty: 90 TABLET | Refills: 0 | Status: SHIPPED | OUTPATIENT
Start: 2022-07-06 | End: 2022-10-09

## 2022-07-06 NOTE — TELEPHONE ENCOUNTER
Rx Refill Note  Requested Prescriptions     Pending Prescriptions Disp Refills   • metoprolol succinate XL (TOPROL-XL) 25 MG 24 hr tablet [Pharmacy Med Name: METOPROLOL SUC 25MG ER^] 90 tablet 0     Sig: TAKE 1 TABLET BY MOUTH EVERY DAY      Last office visit with prescribing clinician: 5/10/2021      Next office visit with prescribing clinician: Visit date not found            Celestina Isaac MA  07/06/22, 16:14 EDT

## 2022-08-09 RX ORDER — ISOSORBIDE MONONITRATE 30 MG/1
TABLET, EXTENDED RELEASE ORAL
Qty: 90 TABLET | Refills: 0 | Status: SHIPPED | OUTPATIENT
Start: 2022-08-09 | End: 2022-11-08

## 2022-08-09 NOTE — TELEPHONE ENCOUNTER
Pt contacted to schedule an appointment. She states that she will call us when she is ready to schedule.

## 2022-08-09 NOTE — TELEPHONE ENCOUNTER
Rx Refill Note  Requested Prescriptions     Pending Prescriptions Disp Refills   • isosorbide mononitrate (IMDUR) 30 MG 24 hr tablet [Pharmacy Med Name: ISOSORBIDE MONO 30MG ER^] 90 tablet 0     Sig: TAKE 1 TABLET BY MOUTH EVERY DAY      Last office visit with prescribing clinician: 5/10/2021      Next office visit with prescribing clinician: Visit date not found            Celestina Isaac MA  08/09/22, 14:31 EDT

## 2022-09-21 ENCOUNTER — OFFICE VISIT (OUTPATIENT)
Dept: CARDIOLOGY | Facility: CLINIC | Age: 87
End: 2022-09-21

## 2022-09-21 VITALS
HEIGHT: 63 IN | WEIGHT: 160 LBS | SYSTOLIC BLOOD PRESSURE: 154 MMHG | DIASTOLIC BLOOD PRESSURE: 63 MMHG | OXYGEN SATURATION: 95 % | BODY MASS INDEX: 28.35 KG/M2 | HEART RATE: 77 BPM

## 2022-09-21 DIAGNOSIS — E78.5 DYSLIPIDEMIA: ICD-10-CM

## 2022-09-21 DIAGNOSIS — E11.9 TYPE 2 DIABETES MELLITUS WITHOUT COMPLICATION, WITHOUT LONG-TERM CURRENT USE OF INSULIN: ICD-10-CM

## 2022-09-21 DIAGNOSIS — I25.10 CAD S/P PERCUTANEOUS CORONARY ANGIOPLASTY: Primary | ICD-10-CM

## 2022-09-21 DIAGNOSIS — Z98.61 CAD S/P PERCUTANEOUS CORONARY ANGIOPLASTY: Primary | ICD-10-CM

## 2022-09-21 DIAGNOSIS — I10 ESSENTIAL HYPERTENSION: ICD-10-CM

## 2022-09-21 DIAGNOSIS — I73.9 PVD (PERIPHERAL VASCULAR DISEASE) WITH CLAUDICATION: ICD-10-CM

## 2022-09-21 PROCEDURE — 99214 OFFICE O/P EST MOD 30 MIN: CPT | Performed by: INTERNAL MEDICINE

## 2022-09-21 PROCEDURE — 93000 ELECTROCARDIOGRAM COMPLETE: CPT | Performed by: INTERNAL MEDICINE

## 2022-09-21 NOTE — PROGRESS NOTES
Subjective:     Encounter Date:09/21/2022      Patient ID: Rowan Guzman is a 87 y.o. female.    Chief Complaint : Follow-up for CAD, PCI, hypertension, dyslipidemia, diabetes    History of Present Illness      Ms. Rowan Guzman has past medical history of    # CAD, YOON to RCA 08/01/2017  #. Mitral valve prolapse  #.  diabetes, hypertension,  dyslipidemia  #.  syncope, SHAE-TACHY       presents  here for  f/u.  Patient denies any chest pain or shortness of breath..  Patient has issues with arthritis preventing her from being active walks with a walker has been noticing some discoloration in bilateral lower extremity and some claudication.    Patient's arterial blood pressure is 154/63, heart rate 77, O2 sat of 95% on room air      Patient states that he diabetes is well controlled hemoglobin A1c was 5.8,. had labs done 08/01/2017 which revealed cholesterol 137 triglycerides 74, HDL 54 LDL 71.. Labs from 5/4/2021 reveal hemoglobin A1c of 6.3, normal CBC, TSH, CK, CMP except for glucose of 113, cholesterol 161 triglycerides 108 HDL 61 LDL 81    Echocardiogram 5/28/2021 revealed normal LV systolic function EF of 60 to 65% with diastolic dysfunction and mild MR         ASSESSMENT:     -Claudication  -  CAD, status post PCI  -.    Mild MR  -   diabetes,   -  hypertension, diastolic dysfunction      PLAN:  Reviewed EKG results with patient.  Will check arterial ABIs   I suspect patient's edema is due to venous insufficiency discussed about exercises  Continue medical management with aspirin, atorvastatin, isosorbide, metoprolol succinate as tolerated to help with CAD, hypertension, valve disease  Advised patient to check blood pressure at home if it is elevated we will add ACE inhibitor's to her medical regimen.  Patient is shae-tachy syndrome will continue to monitor symptoms  Advised patient to monitor blood pressure at home.  Follow-up with PMD for diabetes care  We will follow-up and consider further evaluation  treatment with          ECG 12 Lead    Date/Time: 9/21/2022 11:32 AM  Performed by: Amor López MD  Authorized by: Amor López MD   Comparison: compared with previous ECG from 5/10/2021  Comparison to previous ECG: EKG done today reviewed/interpreted by me reveals sinus rhythm with rate of 77 bpm, no new change compared EKG from 5/10/2021              Copied text in this portion of the note has been reviewed and is accurate as of 9/21/2022  The following portions of the patient's history were reviewed and updated as appropriate: allergies, current medications, past family history, past medical history, past social history, past surgical history and problem list.    Assessment:         [facility]     Diagnosis Plan   1. CAD S/P percutaneous coronary angioplasty  ECG 12 Lead   2. Dyslipidemia  ECG 12 Lead   3. Essential hypertension  ECG 12 Lead   4. Type 2 diabetes mellitus without complication, without long-term current use of insulin (HCC)  ECG 12 Lead    Doppler Ankle Brachial Index Single Level CAR   5. PVD (peripheral vascular disease) with claudication (HCC)  ECG 12 Lead    Doppler Ankle Brachial Index Single Level CAR          Plan:               Past Medical History:  Past Medical History:   Diagnosis Date   • Asthma    • Diabetes mellitus (HCC)     type 2   • Heart disease    • Hyperlipidemia      Past Surgical History:  Past Surgical History:   Procedure Laterality Date   • ABDOMINAL SURGERY      cleaned adhesions   • APPENDECTOMY     • BREAST LUMPECTOMY Right    • CARDIAC CATHETERIZATION  06/10/2015   • COLONOSCOPY     • CORONARY ANGIOPLASTY WITH STENT PLACEMENT  07/31/2017    stent to mid rca   • SKIN CANCER EXCISION      melanoma removal   • THYMECTOMY Left 08/31/2018    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      Allergies:  Allergies   Allergen Reactions   • Penicillins Rash   • Sulfa Antibiotics Diarrhea     Home Meds:  Current Meds:     Current Outpatient  Medications:   •  aspirin (aspirin) 81 MG EC tablet, Daily., Disp: , Rfl:   •  atorvastatin (LIPITOR) 40 MG tablet, , Disp: , Rfl:   •  budesonide (PULMICORT) 0.5 MG/2ML nebulizer solution, PULMICORT 0.5 MG/2ML SUSP, Disp: , Rfl:   •  gabapentin (NEURONTIN) 300 MG capsule, , Disp: , Rfl:   •  ipratropium-albuterol (DUO-NEB) 0.5-2.5 mg/3 ml nebulizer, DUONEB 0.5-2.5 (3) MG/3ML INHALATION SOLUTION, Disp: , Rfl:   •  isosorbide mononitrate (IMDUR) 30 MG 24 hr tablet, TAKE 1 TABLET BY MOUTH EVERY DAY, Disp: 90 tablet, Rfl: 0  •  metoprolol succinate XL (TOPROL-XL) 25 MG 24 hr tablet, TAKE 1 TABLET BY MOUTH EVERY DAY, Disp: 90 tablet, Rfl: 0  •  montelukast (SINGULAIR) 10 MG tablet, , Disp: , Rfl:   •  Multiple Vitamin (MULTI-DAY VITAMINS) tablet, MULTI-DAY VITAMINS TABS, Disp: , Rfl:   •  PREMARIN 0.625 MG/GM vaginal cream, , Disp: , Rfl:   •  PROAIR  (90 Base) MCG/ACT inhaler, , Disp: , Rfl:   •  vitamin C (ASCORBIC ACID) 500 MG tablet, VITAMIN C 500 MG TABS, Disp: , Rfl:   •  alendronate (FOSAMAX) 70 MG tablet, , Disp: , Rfl:   •  cetirizine (zyrTEC) 10 MG tablet, ZYRTEC ALLERGY 10 MG TABS, Disp: , Rfl:   •  FA-Pyridoxine-Cyanocobalamin (FOLBIC PO), FOLBIC TABS, Disp: , Rfl:   •  fluconazole (DIFLUCAN) 200 MG tablet, Take 1 tablet by mouth Daily., Disp: 4 tablet, Rfl: 0  Social History:   Social History     Tobacco Use   • Smoking status: Former Smoker     Quit date: 1989     Years since quittin.0   • Smokeless tobacco: Never Used   Substance Use Topics   • Alcohol use: No      Family History:  Family History   Problem Relation Age of Onset   • Hyperlipidemia Mother    • Diabetes Mother    • Heart attack Mother    • Heart disease Father    • Hypertension Father               Review of Systems   Cardiovascular: Positive for leg swelling. Negative for chest pain and palpitations.   Respiratory: Negative for shortness of breath.    Neurological: Negative for dizziness and numbness.     All other systems  "are negative         Objective:     Physical Exam  /63 (BP Location: Left arm, Patient Position: Sitting, Cuff Size: Large Adult)   Pulse 77   Ht 160 cm (63\")   Wt 72.6 kg (160 lb)   SpO2 95%   BMI 28.34 kg/m²   General:  Appears in no acute distress  Eyes: Sclera is anicteric,  conjunctiva is clear   HEENT:  No JVD.  No carotid bruits  Respiratory: Respirations regular and unlabored at rest.  Clear to auscultation  Cardiovascular: S1,S2 Regular rate and rhythm. No murmur, rub or gallop auscultated.   Extremities: No digital clubbing or cyanosis, no edema  Skin: Color pink. Skin warm and dry to touch. No rashes  No xanthoma  Neuro: Alert and awake.  Walks with a cane    Lab Reviewed:         Amor López MD  9/21/2022 11:40 EDT      EMR Dragon/Transcription:   \"Dictated utilizing Dragon dictation\".        "

## 2022-09-27 ENCOUNTER — TELEPHONE (OUTPATIENT)
Dept: CARDIOLOGY | Facility: CLINIC | Age: 87
End: 2022-09-27

## 2022-10-09 RX ORDER — METOPROLOL SUCCINATE 25 MG/1
TABLET, EXTENDED RELEASE ORAL
Qty: 90 TABLET | Refills: 3 | Status: SHIPPED | OUTPATIENT
Start: 2022-10-09

## 2022-10-09 NOTE — TELEPHONE ENCOUNTER
Rx Refill Note  Requested Prescriptions     Pending Prescriptions Disp Refills   • metoprolol succinate XL (TOPROL-XL) 25 MG 24 hr tablet [Pharmacy Med Name: METOPROLOL SUC 25MG ER^] 90 tablet 3     Sig: TAKE 1 TABLET BY MOUTH EVERY DAY      Last office visit with prescribing clinician: 9/21/2022      Next office visit with prescribing clinician: 9/26/2023            Shiela Tirado MA  10/09/22, 13:13 EDT

## 2022-11-08 RX ORDER — ISOSORBIDE MONONITRATE 30 MG/1
TABLET, EXTENDED RELEASE ORAL
Qty: 90 TABLET | Refills: 3 | Status: SHIPPED | OUTPATIENT
Start: 2022-11-08

## 2022-11-08 NOTE — TELEPHONE ENCOUNTER
Rx Refill Note  Requested Prescriptions     Pending Prescriptions Disp Refills   • isosorbide mononitrate (IMDUR) 30 MG 24 hr tablet [Pharmacy Med Name: ISOSORBIDE MONO 30MG ER^] 90 tablet 3     Sig: TAKE 1 TABLET BY MOUTH EVERY DAY      Last office visit with prescribing clinician: 9/21/2022      Next office visit with prescribing clinician: 9/26/2023            Ita Nuñez MA  11/08/22, 11:43 EST

## 2022-12-22 ENCOUNTER — TRANSCRIBE ORDERS (OUTPATIENT)
Dept: ADMINISTRATIVE | Facility: HOSPITAL | Age: 87
End: 2022-12-22

## 2022-12-22 ENCOUNTER — LAB (OUTPATIENT)
Dept: LAB | Facility: HOSPITAL | Age: 87
End: 2022-12-22

## 2022-12-22 DIAGNOSIS — I43 DILATED CARDIOMYOPATHY SECONDARY TO SENSITIVITY: ICD-10-CM

## 2022-12-22 DIAGNOSIS — J45.909: ICD-10-CM

## 2022-12-22 DIAGNOSIS — R41.3 MEMORY LOSS: ICD-10-CM

## 2022-12-22 DIAGNOSIS — E78.81 LIPOID DERMATOARTHRITIS: ICD-10-CM

## 2022-12-22 DIAGNOSIS — E55.9 VITAMIN D INSUFFICIENCY: ICD-10-CM

## 2022-12-22 DIAGNOSIS — E11.9 DIABETES MELLITUS WITHOUT COMPLICATION: ICD-10-CM

## 2022-12-22 DIAGNOSIS — T78.40XA DILATED CARDIOMYOPATHY SECONDARY TO SENSITIVITY: ICD-10-CM

## 2022-12-22 DIAGNOSIS — J44.9 OBSTRUCTIVE CHRONIC BRONCHITIS WITHOUT EXACERBATION: ICD-10-CM

## 2022-12-22 DIAGNOSIS — M19.90 SENILE ARTHRITIS: ICD-10-CM

## 2022-12-22 DIAGNOSIS — E03.9 MYXEDEMA HEART DISEASE: ICD-10-CM

## 2022-12-22 DIAGNOSIS — E03.9 MYXEDEMA HEART DISEASE: Primary | ICD-10-CM

## 2022-12-22 DIAGNOSIS — I51.9 MYXEDEMA HEART DISEASE: Primary | ICD-10-CM

## 2022-12-22 DIAGNOSIS — I51.9 MYXEDEMA HEART DISEASE: ICD-10-CM

## 2022-12-22 LAB
25(OH)D3 SERPL-MCNC: 80.5 NG/ML (ref 30–100)
ALBUMIN SERPL-MCNC: 4.2 G/DL (ref 3.5–5.2)
ALBUMIN/GLOB SERPL: 1.8 G/DL
ALP SERPL-CCNC: 65 U/L (ref 39–117)
ALT SERPL W P-5'-P-CCNC: 13 U/L (ref 1–33)
ANION GAP SERPL CALCULATED.3IONS-SCNC: 7 MMOL/L (ref 5–15)
AST SERPL-CCNC: 13 U/L (ref 1–32)
BASOPHILS # BLD AUTO: 0.08 10*3/MM3 (ref 0–0.2)
BASOPHILS NFR BLD AUTO: 1 % (ref 0–1.5)
BILIRUB SERPL-MCNC: 0.5 MG/DL (ref 0–1.2)
BUN SERPL-MCNC: 15 MG/DL (ref 8–23)
BUN/CREAT SERPL: 19 (ref 7–25)
CALCIUM SPEC-SCNC: 9.2 MG/DL (ref 8.6–10.5)
CHLORIDE SERPL-SCNC: 102 MMOL/L (ref 98–107)
CHOLEST SERPL-MCNC: 181 MG/DL (ref 0–200)
CO2 SERPL-SCNC: 29 MMOL/L (ref 22–29)
CREAT SERPL-MCNC: 0.79 MG/DL (ref 0.57–1)
DEPRECATED RDW RBC AUTO: 43.3 FL (ref 37–54)
EGFRCR SERPLBLD CKD-EPI 2021: 73.4 ML/MIN/1.73
EOSINOPHIL # BLD AUTO: 0.21 10*3/MM3 (ref 0–0.4)
EOSINOPHIL NFR BLD AUTO: 2.6 % (ref 0.3–6.2)
ERYTHROCYTE [DISTWIDTH] IN BLOOD BY AUTOMATED COUNT: 12.7 % (ref 12.3–15.4)
GLOBULIN UR ELPH-MCNC: 2.3 GM/DL
GLUCOSE SERPL-MCNC: 105 MG/DL (ref 65–99)
HBA1C MFR BLD: 5.9 % (ref 3.5–5.6)
HCT VFR BLD AUTO: 39.8 % (ref 34–46.6)
HDLC SERPL-MCNC: 81 MG/DL (ref 40–60)
HGB BLD-MCNC: 13.3 G/DL (ref 12–15.9)
IMM GRANULOCYTES # BLD AUTO: 0.04 10*3/MM3 (ref 0–0.05)
IMM GRANULOCYTES NFR BLD AUTO: 0.5 % (ref 0–0.5)
LDLC SERPL CALC-MCNC: 87 MG/DL (ref 0–100)
LDLC/HDLC SERPL: 1.06 {RATIO}
LYMPHOCYTES # BLD AUTO: 2.57 10*3/MM3 (ref 0.7–3.1)
LYMPHOCYTES NFR BLD AUTO: 31.4 % (ref 19.6–45.3)
MCH RBC QN AUTO: 31 PG (ref 26.6–33)
MCHC RBC AUTO-ENTMCNC: 33.4 G/DL (ref 31.5–35.7)
MCV RBC AUTO: 92.8 FL (ref 79–97)
MONOCYTES # BLD AUTO: 0.82 10*3/MM3 (ref 0.1–0.9)
MONOCYTES NFR BLD AUTO: 10 % (ref 5–12)
NEUTROPHILS NFR BLD AUTO: 4.46 10*3/MM3 (ref 1.7–7)
NEUTROPHILS NFR BLD AUTO: 54.5 % (ref 42.7–76)
NRBC BLD AUTO-RTO: 0 /100 WBC (ref 0–0.2)
PLATELET # BLD AUTO: 497 10*3/MM3 (ref 140–450)
PMV BLD AUTO: 8.4 FL (ref 6–12)
POTASSIUM SERPL-SCNC: 4.1 MMOL/L (ref 3.5–5.2)
PROT SERPL-MCNC: 6.5 G/DL (ref 6–8.5)
RBC # BLD AUTO: 4.29 10*6/MM3 (ref 3.77–5.28)
SODIUM SERPL-SCNC: 138 MMOL/L (ref 136–145)
TRIGL SERPL-MCNC: 71 MG/DL (ref 0–150)
TSH SERPL DL<=0.05 MIU/L-ACNC: 2.44 UIU/ML (ref 0.27–4.2)
VLDLC SERPL-MCNC: 13 MG/DL (ref 5–40)
WBC NRBC COR # BLD: 8.18 10*3/MM3 (ref 3.4–10.8)

## 2022-12-22 PROCEDURE — 83036 HEMOGLOBIN GLYCOSYLATED A1C: CPT

## 2022-12-22 PROCEDURE — 36415 COLL VENOUS BLD VENIPUNCTURE: CPT

## 2022-12-22 PROCEDURE — 85025 COMPLETE CBC W/AUTO DIFF WBC: CPT

## 2022-12-22 PROCEDURE — 80061 LIPID PANEL: CPT

## 2022-12-22 PROCEDURE — 82306 VITAMIN D 25 HYDROXY: CPT

## 2022-12-22 PROCEDURE — 84443 ASSAY THYROID STIM HORMONE: CPT

## 2022-12-22 PROCEDURE — 80053 COMPREHEN METABOLIC PANEL: CPT

## 2023-10-06 RX ORDER — METOPROLOL SUCCINATE 25 MG/1
TABLET, EXTENDED RELEASE ORAL
Qty: 90 TABLET | Refills: 2 | Status: SHIPPED | OUTPATIENT
Start: 2023-10-06

## 2023-10-06 NOTE — TELEPHONE ENCOUNTER
Rx Refill Note  Requested Prescriptions     Pending Prescriptions Disp Refills    metoprolol succinate XL (TOPROL-XL) 25 MG 24 hr tablet [Pharmacy Med Name: METOPROLOL SUC 25MG ER^] 90 tablet 2     Sig: TAKE 1 TABLET BY MOUTH EVERY DAY      Last office visit with prescribing clinician: 9/21/2022   Last telemedicine visit with prescribing clinician: Visit date not found   Next office visit with prescribing clinician: Visit date not found                         Would you like a call back once the refill request has been completed: [] Yes [] No    If the office needs to give you a call back, can they leave a voicemail: [] Yes [] No    Mer Sesay MA  10/06/23, 15:31 EDT

## 2023-11-02 ENCOUNTER — APPOINTMENT (OUTPATIENT)
Dept: CT IMAGING | Facility: HOSPITAL | Age: 88
DRG: 683 | End: 2023-11-02
Payer: MEDICARE

## 2023-11-02 ENCOUNTER — APPOINTMENT (OUTPATIENT)
Dept: GENERAL RADIOLOGY | Facility: HOSPITAL | Age: 88
DRG: 683 | End: 2023-11-02
Payer: MEDICARE

## 2023-11-02 ENCOUNTER — HOSPITAL ENCOUNTER (INPATIENT)
Facility: HOSPITAL | Age: 88
LOS: 3 days | Discharge: NURSING, REHAB OR LONG-TERM CARE | DRG: 683 | End: 2023-11-06
Attending: STUDENT IN AN ORGANIZED HEALTH CARE EDUCATION/TRAINING PROGRAM | Admitting: FAMILY MEDICINE
Payer: MEDICARE

## 2023-11-02 DIAGNOSIS — N17.9 ACUTE RENAL FAILURE, UNSPECIFIED ACUTE RENAL FAILURE TYPE: ICD-10-CM

## 2023-11-02 DIAGNOSIS — R91.1 PULMONARY NODULE: ICD-10-CM

## 2023-11-02 DIAGNOSIS — M62.82 NON-TRAUMATIC RHABDOMYOLYSIS: Primary | ICD-10-CM

## 2023-11-02 DIAGNOSIS — R79.89 ELEVATED TROPONIN: ICD-10-CM

## 2023-11-02 LAB
ALBUMIN SERPL-MCNC: 3.8 G/DL (ref 3.5–5.2)
ALBUMIN/GLOB SERPL: 1.5 G/DL
ALP SERPL-CCNC: 59 U/L (ref 39–117)
ALT SERPL W P-5'-P-CCNC: 18 U/L (ref 1–33)
ANION GAP SERPL CALCULATED.3IONS-SCNC: 10.2 MMOL/L (ref 5–15)
AST SERPL-CCNC: 25 U/L (ref 1–32)
BASOPHILS # BLD AUTO: 0.03 10*3/MM3 (ref 0–0.2)
BASOPHILS NFR BLD AUTO: 0.2 % (ref 0–1.5)
BILIRUB SERPL-MCNC: 0.6 MG/DL (ref 0–1.2)
BILIRUB UR QL STRIP: ABNORMAL
BUN SERPL-MCNC: 26 MG/DL (ref 8–23)
BUN/CREAT SERPL: 13.2 (ref 7–25)
CALCIUM SPEC-SCNC: 9.2 MG/DL (ref 8.6–10.5)
CHLORIDE SERPL-SCNC: 99 MMOL/L (ref 98–107)
CK SERPL-CCNC: 422 U/L (ref 20–180)
CLARITY UR: ABNORMAL
CO2 SERPL-SCNC: 24.8 MMOL/L (ref 22–29)
COLOR UR: ABNORMAL
CREAT SERPL-MCNC: 1.97 MG/DL (ref 0.57–1)
D-LACTATE SERPL-SCNC: 1.6 MMOL/L (ref 0.5–2)
DEPRECATED RDW RBC AUTO: 47.1 FL (ref 37–54)
EGFRCR SERPLBLD CKD-EPI 2021: 24.4 ML/MIN/1.73
EOSINOPHIL # BLD AUTO: 0.04 10*3/MM3 (ref 0–0.4)
EOSINOPHIL NFR BLD AUTO: 0.3 % (ref 0.3–6.2)
ERYTHROCYTE [DISTWIDTH] IN BLOOD BY AUTOMATED COUNT: 13.6 % (ref 12.3–15.4)
GEN 5 2HR TROPONIN T REFLEX: 245 NG/L
GLOBULIN UR ELPH-MCNC: 2.5 GM/DL
GLUCOSE SERPL-MCNC: 142 MG/DL (ref 65–99)
GLUCOSE UR STRIP-MCNC: NEGATIVE MG/DL
HCT VFR BLD AUTO: 40.5 % (ref 34–46.6)
HGB BLD-MCNC: 13.1 G/DL (ref 12–15.9)
HGB UR QL STRIP.AUTO: ABNORMAL
IMM GRANULOCYTES # BLD AUTO: 0.06 10*3/MM3 (ref 0–0.05)
IMM GRANULOCYTES NFR BLD AUTO: 0.5 % (ref 0–0.5)
KETONES UR QL STRIP: ABNORMAL
LEUKOCYTE ESTERASE UR QL STRIP.AUTO: ABNORMAL
LYMPHOCYTES # BLD AUTO: 1.87 10*3/MM3 (ref 0.7–3.1)
LYMPHOCYTES NFR BLD AUTO: 15 % (ref 19.6–45.3)
MAGNESIUM SERPL-MCNC: 2 MG/DL (ref 1.6–2.4)
MCH RBC QN AUTO: 30.3 PG (ref 26.6–33)
MCHC RBC AUTO-ENTMCNC: 32.3 G/DL (ref 31.5–35.7)
MCV RBC AUTO: 93.5 FL (ref 79–97)
MONOCYTES # BLD AUTO: 1.19 10*3/MM3 (ref 0.1–0.9)
MONOCYTES NFR BLD AUTO: 9.5 % (ref 5–12)
NEUTROPHILS NFR BLD AUTO: 74.5 % (ref 42.7–76)
NEUTROPHILS NFR BLD AUTO: 9.28 10*3/MM3 (ref 1.7–7)
NITRITE UR QL STRIP: NEGATIVE
PH UR STRIP.AUTO: <=5 [PH] (ref 5–8)
PLATELET # BLD AUTO: 529 10*3/MM3 (ref 140–450)
PMV BLD AUTO: 8.7 FL (ref 6–12)
POTASSIUM SERPL-SCNC: 3.8 MMOL/L (ref 3.5–5.2)
PROT SERPL-MCNC: 6.3 G/DL (ref 6–8.5)
PROT UR QL STRIP: ABNORMAL
QT INTERVAL: 393 MS
QTC INTERVAL: 482 MS
RBC # BLD AUTO: 4.33 10*6/MM3 (ref 3.77–5.28)
SODIUM SERPL-SCNC: 134 MMOL/L (ref 136–145)
SP GR UR STRIP: 1.02 (ref 1–1.03)
TROPONIN T DELTA: -93 NG/L
TROPONIN T SERPL HS-MCNC: 338 NG/L
UROBILINOGEN UR QL STRIP: ABNORMAL
WBC NRBC COR # BLD: 12.47 10*3/MM3 (ref 3.4–10.8)

## 2023-11-02 PROCEDURE — 93010 ELECTROCARDIOGRAM REPORT: CPT | Performed by: STUDENT IN AN ORGANIZED HEALTH CARE EDUCATION/TRAINING PROGRAM

## 2023-11-02 PROCEDURE — 84484 ASSAY OF TROPONIN QUANT: CPT | Performed by: STUDENT IN AN ORGANIZED HEALTH CARE EDUCATION/TRAINING PROGRAM

## 2023-11-02 PROCEDURE — 73080 X-RAY EXAM OF ELBOW: CPT

## 2023-11-02 PROCEDURE — 82550 ASSAY OF CK (CPK): CPT | Performed by: STUDENT IN AN ORGANIZED HEALTH CARE EDUCATION/TRAINING PROGRAM

## 2023-11-02 PROCEDURE — 25810000003 SODIUM CHLORIDE 0.9 % SOLUTION: Performed by: STUDENT IN AN ORGANIZED HEALTH CARE EDUCATION/TRAINING PROGRAM

## 2023-11-02 PROCEDURE — 81003 URINALYSIS AUTO W/O SCOPE: CPT | Performed by: STUDENT IN AN ORGANIZED HEALTH CARE EDUCATION/TRAINING PROGRAM

## 2023-11-02 PROCEDURE — 83605 ASSAY OF LACTIC ACID: CPT | Performed by: STUDENT IN AN ORGANIZED HEALTH CARE EDUCATION/TRAINING PROGRAM

## 2023-11-02 PROCEDURE — 70450 CT HEAD/BRAIN W/O DYE: CPT

## 2023-11-02 PROCEDURE — 83735 ASSAY OF MAGNESIUM: CPT | Performed by: STUDENT IN AN ORGANIZED HEALTH CARE EDUCATION/TRAINING PROGRAM

## 2023-11-02 PROCEDURE — 73502 X-RAY EXAM HIP UNI 2-3 VIEWS: CPT

## 2023-11-02 PROCEDURE — 71046 X-RAY EXAM CHEST 2 VIEWS: CPT

## 2023-11-02 PROCEDURE — 36415 COLL VENOUS BLD VENIPUNCTURE: CPT

## 2023-11-02 PROCEDURE — 99285 EMERGENCY DEPT VISIT HI MDM: CPT | Performed by: STUDENT IN AN ORGANIZED HEALTH CARE EDUCATION/TRAINING PROGRAM

## 2023-11-02 PROCEDURE — 73562 X-RAY EXAM OF KNEE 3: CPT

## 2023-11-02 PROCEDURE — 93005 ELECTROCARDIOGRAM TRACING: CPT | Performed by: STUDENT IN AN ORGANIZED HEALTH CARE EDUCATION/TRAINING PROGRAM

## 2023-11-02 PROCEDURE — 99285 EMERGENCY DEPT VISIT HI MDM: CPT

## 2023-11-02 PROCEDURE — 85025 COMPLETE CBC W/AUTO DIFF WBC: CPT | Performed by: STUDENT IN AN ORGANIZED HEALTH CARE EDUCATION/TRAINING PROGRAM

## 2023-11-02 PROCEDURE — 72125 CT NECK SPINE W/O DYE: CPT

## 2023-11-02 PROCEDURE — 80053 COMPREHEN METABOLIC PANEL: CPT | Performed by: STUDENT IN AN ORGANIZED HEALTH CARE EDUCATION/TRAINING PROGRAM

## 2023-11-02 RX ORDER — SODIUM CHLORIDE 0.9 % (FLUSH) 0.9 %
10 SYRINGE (ML) INJECTION AS NEEDED
Status: DISCONTINUED | OUTPATIENT
Start: 2023-11-02 | End: 2023-11-06 | Stop reason: HOSPADM

## 2023-11-02 RX ORDER — ACETAMINOPHEN 500 MG
1000 TABLET ORAL ONCE
Status: COMPLETED | OUTPATIENT
Start: 2023-11-02 | End: 2023-11-02

## 2023-11-02 RX ORDER — SODIUM CHLORIDE 9 MG/ML
125 INJECTION, SOLUTION INTRAVENOUS CONTINUOUS
Status: DISCONTINUED | OUTPATIENT
Start: 2023-11-02 | End: 2023-11-03

## 2023-11-02 RX ADMIN — SODIUM CHLORIDE 1000 ML: 9 INJECTION, SOLUTION INTRAVENOUS at 15:50

## 2023-11-02 RX ADMIN — ACETAMINOPHEN 1000 MG: 500 TABLET ORAL at 17:55

## 2023-11-02 RX ADMIN — SODIUM CHLORIDE 125 ML/HR: 9 INJECTION, SOLUTION INTRAVENOUS at 18:07

## 2023-11-02 NOTE — ED NOTES
Complaints of left hip pain. Repositioned for comfort. Physician at bedside to review results of imaging - no fractures of the hip. Will medicate with tylenol.  Patient states she is still unable to void. 1 liter of normal saline infusing. After tylenol and bolus - will straight cath if patient still unable to void.

## 2023-11-02 NOTE — FSED PROVIDER NOTE
Patient was turned over to me at change of shift as the outgoing provider had been waiting 1.5 hours for hospitalist to call back.    Case discussed with on-call hospitalist.  Patient will be admitted.  Unfortunately do not have previous baseline lab tests I do not know patient's baseline renal function.  Patient does have elevated troponin levels which have trended down and also elevated creatinine at 1.97.  CK was 422.  Patient will be admitted for observation.  Appreciate assistance with patient's care.

## 2023-11-02 NOTE — FSED PROVIDER NOTE
Subjective   History of Present Illness  Patient is an 88-year-old female who presents to the emergency department for altered mental status and fall.  Patient is a history of diabetes, hyperlipidemia, CAD, asthma, appendectomy.  Patient is accompanied by her son and daughter-in-law.  Patient lives alone, ambulates with a walker.  Patient's son went over to her house last evening around 9 PM and found her in front of her recliner on the ground.  Patient's neighbor attempted to start getting a hold of her around 3 PM so patient may have potentially been on the ground for anywhere for 6 to 8 hours.  Family states she does have some repetitive questioning, but they state that she appears to be more confused than normal.  Patient does not take any blood thinners.  Patient is unsure of why she fell last evening, she is unsure if she fell out of her chair or if she was using her walker.  Patient currently denies any headache, vision changes, numbness, weakness, neck pain, back pain.  She is complaining of some left hip pain and bilateral knee pain.  Patient has some chronic lower extremity edema and family does not state if it looks worse than normal.  They have noticed some mild bruising to her left elbow.      Review of Systems   Constitutional:  Negative for activity change and fever.   HENT:  Negative for congestion, rhinorrhea and sore throat.    Respiratory:  Negative for cough and shortness of breath.    Cardiovascular:  Negative for chest pain.   Gastrointestinal:  Negative for abdominal pain, nausea and vomiting.   Genitourinary:  Negative for dysuria.   Musculoskeletal:  Positive for arthralgias. Negative for back pain and myalgias.   Skin:  Negative for rash.   Neurological:  Negative for dizziness, light-headedness and headaches.   Psychiatric/Behavioral:  Positive for confusion.        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   Constitutional:       General: She is not in acute distress.     Appearance: Normal appearance. She is not ill-appearing.   HENT:      Head: Normocephalic and atraumatic.      Right Ear: Tympanic membrane normal.      Left Ear: Tympanic membrane normal.      Nose: Nose normal. No congestion.      Mouth/Throat:      Mouth: Mucous membranes are moist.      Pharynx: No oropharyngeal exudate.   Eyes:      General: No visual field deficit.     Extraocular Movements:      Right eye: No nystagmus.      Left eye: No nystagmus.      Conjunctiva/sclera: Conjunctivae normal.      Pupils: Pupils are equal, round, and reactive to light.   Neck:      Comments: No midline neck pain  Cardiovascular:      Rate and Rhythm: Normal rate and regular rhythm.      Heart sounds: No murmur heard.  Pulmonary:      Effort: Pulmonary effort is normal.       Breath sounds: No wheezing, rhonchi or rales.   Chest:      Chest wall: No tenderness.      Comments: No ecchymosis, bruising or abrasions to the chest wall  Abdominal:      General: Abdomen is flat.      Palpations: Abdomen is soft.      Tenderness: There is no abdominal tenderness. There is no guarding or rebound.   Musculoskeletal:         General: No swelling or tenderness. Normal range of motion.      Right shoulder: No swelling or deformity.      Left shoulder: No swelling or deformity.      Right upper arm: No swelling, edema or tenderness.      Left upper arm: No swelling, edema or tenderness.      Right elbow: No swelling or deformity.      Left elbow: No swelling. Tenderness present.      Cervical back: Normal range of motion and neck supple. No bony tenderness. No spinous process tenderness or muscular tenderness.      Thoracic back: No bony tenderness.      Lumbar back: No spasms, tenderness or bony tenderness.      Right hip: No deformity or bony tenderness. Normal range of motion.      Left hip: Bony tenderness present. No deformity. Normal range of motion.      Right lower le+ Pitting Edema present.      Left lower le+ Pitting Edema present.      Comments: Patient has no midline back pain, there are no abrasions, ecchymosis to the back.  Patient with tenderness to the left hip, no gross deformity.  Patient does have bruising to the left elbow but full range of motion with no gross deformity.  All compartments are soft   Skin:     General: Skin is warm and dry.      Findings: No rash.   Neurological:      General: No focal deficit present.      Mental Status: She is alert.      GCS: GCS eye subscore is 4. GCS verbal subscore is 5. GCS motor subscore is 6.      Cranial Nerves: Cranial nerves 2-12 are intact. No cranial nerve deficit, dysarthria or facial asymmetry.      Sensory: Sensation is intact. No sensory deficit.      Motor: Motor function is intact. No weakness or pronator drift.       Coordination: Coordination is intact. Finger-Nose-Finger Test normal.      Comments: Patient is awake, alert.  She is oriented to person, place but believes the year is 2010.  Her speech is clear and coherent.  Her cranial nerves II through XII are grossly intact.  She has 5-5 motor strength in the upper and lower extremities.  No pronator drift.  No gross neurological deficit.         Procedures           ED Course  ED Course as of 11/03/23 0711   u Nov 02, 2023   1550 ECG 12 Lead Syncope  Patient's EKG is sinus rhythm rate 90, normal axis, normal intervals, no ST elevation or depression.  Normal EKG. [CO]   1625 WBC(!): 12.47 [CO]   1625 Hemoglobin: 13.1 [CO]   1625 Platelets(!): 529 [CO]   1625 HS Troponin T(!!): 338  Likely elevated secondary to renal failure. Will hold on Heparin. EKG is without ischemic changes. [CO]   1647 Echo 5/10/21  Normal LV size and contractility EF of 60 to 65% abnormal relaxation pattern consistent with diastolic dysfunction seen.  Normal RV size  Normal atrial size  Aortic valve, mitral valve, tricuspid valve appears structurally normal, mild mitral and tricuspid regurgitation seen.  Elevated RV systolic pressure of 32 mmHg  No pericardial effusion seen.  Proximal aorta appears normal in size. [CO]   1656 XR Elbow 3+ View Left  IMPRESSION:  Impression:  No acute abnormality [CO]   1707 CT Head Without Contrast  IMPRESSION:  Impression:  1.No acute traumatic injury within the head nor cervical spine identified.  2.Age-related changes as detailed above. [CO]   1707 CT Cervical Spine Without Contrast  IMPRESSION:  Impression:  1.No acute traumatic injury within the head nor cervical spine identified.  2.Age-related changes as detailed above. [CO]   1707 XR Chest 2 View  IMPRESSION:  Impression:  7 mm nodule at the right lung base. A follow-up noncontrast CT thorax can be obtained for evaluation. [CO]   1709 XR Hip With or Without Pelvis 2 - 3 View Left  Impression:  No definite acute  fracture or dislocation.     Extensive degenerative changes with chronic appearing deformity of the left femoral head. Underlying AVN is not excluded. [CO]   1709 XR Knee 3 View Left  pression:  No evidence for acute osseous injury to the right or left knee.     Advanced right knee lateral compartment degenerative arthrosis and joint effusion. [CO]   1709 XR Knee 3 View Right  Impression:  No evidence for acute osseous injury to the right or left knee.     Advanced right knee lateral compartment degenerative arthrosis and joint effusion [CO]   1759 Second page to hospitalist [CO]   1828 Called to the hospitalist answering service.  Patient be admitted to the night team, page to be replaced at 7 PM. [CO]   1841 HS Troponin T(!!): 245 [CO]   1856 Creatine Kinase(!): 422 [CO]      ED Course User Index  [CO] Raisa White,                                            Medical Decision Making  Patient is an 88-year-old female who presents emergency department for altered mental status and fall.  HPI as listed above.  On arrival she is afebrile, slightly labile blood pressure but otherwise hemodynamically stable.  She was placed on cardiac monitor, IV was established.  Patient's EKG is normal sinus rhythm with no ischemic changes, no peaked T waves or arrhythmia.  Patient provided 1 L of IV fluid.  Her physical examination she is alert and oriented to person, place but does believe the year is 2010.  She has no obvious outward traumatic injury.  CT brain shows no traumatic injury, CT cervical spine without traumatic injury.  Chest x-ray, left elbow x-ray, left hip x-ray and bilateral knee x-rays showed no traumatic injury there is concern for possible avascular necrosis of the left femoral head which is likely more chronic.  Patient's blood work does show abnormalities with an elevated troponin of 338, acute renal failure with a creatinine of 1.97 which is more than double her baseline.  Troponin is likely elevated  secondary to renal failure, given she has no ischemic changes on her EKG and has no chest pain I will hold on heparin at this time we will continue to trend her troponin level.  Patient provided IV fluid, pain control for her left hip.  UA is pending on admission, but will need to rule out concomitant UTI.  Patient will be admitted to the hospital for continued IV fluid hydration due to concern for rhabdomyolysis, we are unable to written patient's CPK level here but I do suspect that this is going to be elevated and likely the cause of her acute renal failure.    Problems Addressed:  Acute renal failure, unspecified acute renal failure type: complicated acute illness or injury  Elevated troponin: complicated acute illness or injury  Non-traumatic rhabdomyolysis: complicated acute illness or injury  Pulmonary nodule: complicated acute illness or injury    Amount and/or Complexity of Data Reviewed  Labs: ordered. Decision-making details documented in ED Course.  Radiology: ordered. Decision-making details documented in ED Course.  ECG/medicine tests: ordered. Decision-making details documented in ED Course.    Risk  OTC drugs.  Prescription drug management.  Decision regarding hospitalization.        Final diagnoses:   Non-traumatic rhabdomyolysis   Elevated troponin   Acute renal failure, unspecified acute renal failure type   Pulmonary nodule       ED Disposition  ED Disposition       ED Disposition   Decision to Admit    Condition   --    Comment   Level of Care: Telemetry [5]   Diagnosis: PATT (acute kidney injury) [[id number]]   Admitting Physician: LAURA NAILS [[id number]]   Attending Physician: LAURA NAILS [[id number]]                 No follow-up provider specified.       Medication List      No changes were made to your prescriptions during this visit.

## 2023-11-03 ENCOUNTER — APPOINTMENT (OUTPATIENT)
Dept: CARDIOLOGY | Facility: HOSPITAL | Age: 88
DRG: 683 | End: 2023-11-03
Payer: MEDICARE

## 2023-11-03 ENCOUNTER — APPOINTMENT (OUTPATIENT)
Dept: ULTRASOUND IMAGING | Facility: HOSPITAL | Age: 88
DRG: 683 | End: 2023-11-03
Payer: MEDICARE

## 2023-11-03 PROBLEM — M62.82 RHABDOMYOLYSIS: Status: ACTIVE | Noted: 2023-11-03

## 2023-11-03 LAB
ALBUMIN SERPL-MCNC: 2.9 G/DL (ref 3.5–5.2)
ALBUMIN/GLOB SERPL: 1.1 G/DL
ALP SERPL-CCNC: 74 U/L (ref 39–117)
ALT SERPL W P-5'-P-CCNC: 14 U/L (ref 1–33)
ANION GAP SERPL CALCULATED.3IONS-SCNC: 10 MMOL/L (ref 5–15)
AST SERPL-CCNC: 19 U/L (ref 1–32)
BACTERIA UR QL AUTO: ABNORMAL /HPF
BH CV ECHO MEAS - ACS: 2 CM
BH CV ECHO MEAS - AI P1/2T: 1218 MSEC
BH CV ECHO MEAS - AO MAX PG: 7.8 MMHG
BH CV ECHO MEAS - AO MEAN PG: 5 MMHG
BH CV ECHO MEAS - AO ROOT DIAM: 3.4 CM
BH CV ECHO MEAS - AO V2 MAX: 140 CM/SEC
BH CV ECHO MEAS - AO V2 VTI: 33 CM
BH CV ECHO MEAS - AVA(I,D): 2.6 CM2
BH CV ECHO MEAS - EDV(CUBED): 68.9 ML
BH CV ECHO MEAS - EDV(MOD-SP4): 32.5 ML
BH CV ECHO MEAS - EF(MOD-BP): 64 %
BH CV ECHO MEAS - EF(MOD-SP4): 64.3 %
BH CV ECHO MEAS - ESV(MOD-SP4): 11.6 ML
BH CV ECHO MEAS - FS: 34.1 %
BH CV ECHO MEAS - IVS/LVPW: 0.88 CM
BH CV ECHO MEAS - IVSD: 0.7 CM
BH CV ECHO MEAS - LA DIMENSION: 3 CM
BH CV ECHO MEAS - LAT PEAK E' VEL: 7.5 CM/SEC
BH CV ECHO MEAS - LV DIASTOLIC VOL/BSA (35-75): 18.5 CM2
BH CV ECHO MEAS - LV MASS(C)D: 89.4 GRAMS
BH CV ECHO MEAS - LV MAX PG: 6.5 MMHG
BH CV ECHO MEAS - LV MEAN PG: 3 MMHG
BH CV ECHO MEAS - LV SYSTOLIC VOL/BSA (12-30): 6.6 CM2
BH CV ECHO MEAS - LV V1 MAX: 127 CM/SEC
BH CV ECHO MEAS - LV V1 VTI: 27.3 CM
BH CV ECHO MEAS - LVIDD: 4.1 CM
BH CV ECHO MEAS - LVIDS: 2.7 CM
BH CV ECHO MEAS - LVOT AREA: 3.1 CM2
BH CV ECHO MEAS - LVOT DIAM: 2 CM
BH CV ECHO MEAS - LVPWD: 0.8 CM
BH CV ECHO MEAS - MED PEAK E' VEL: 7.5 CM/SEC
BH CV ECHO MEAS - MR MAX PG: 87.2 MMHG
BH CV ECHO MEAS - MR MAX VEL: 467 CM/SEC
BH CV ECHO MEAS - MV A DUR: 0.12 SEC
BH CV ECHO MEAS - MV A MAX VEL: 137 CM/SEC
BH CV ECHO MEAS - MV DEC SLOPE: 771 CM/SEC2
BH CV ECHO MEAS - MV DEC TIME: 0.22 SEC
BH CV ECHO MEAS - MV E MAX VEL: 71.3 CM/SEC
BH CV ECHO MEAS - MV E/A: 0.52
BH CV ECHO MEAS - MV MAX PG: 10.5 MMHG
BH CV ECHO MEAS - MV MEAN PG: 3 MMHG
BH CV ECHO MEAS - MV P1/2T: 40.3 MSEC
BH CV ECHO MEAS - MV V2 VTI: 30.2 CM
BH CV ECHO MEAS - MVA(P1/2T): 5.5 CM2
BH CV ECHO MEAS - MVA(VTI): 2.8 CM2
BH CV ECHO MEAS - PA V2 MAX: 97.7 CM/SEC
BH CV ECHO MEAS - PULM A REVS DUR: 0.11 SEC
BH CV ECHO MEAS - PULM A REVS VEL: 41.6 CM/SEC
BH CV ECHO MEAS - PULM DIAS VEL: 31.3 CM/SEC
BH CV ECHO MEAS - PULM S/D: 1.33
BH CV ECHO MEAS - PULM SYS VEL: 41.6 CM/SEC
BH CV ECHO MEAS - RV MAX PG: 3 MMHG
BH CV ECHO MEAS - RV V1 MAX: 87 CM/SEC
BH CV ECHO MEAS - RV V1 VTI: 17.9 CM
BH CV ECHO MEAS - SI(MOD-SP4): 11.9 ML/M2
BH CV ECHO MEAS - SV(LVOT): 85.8 ML
BH CV ECHO MEAS - SV(MOD-SP4): 20.9 ML
BH CV ECHO MEAS - TAPSE (>1.6): 2.45 CM
BH CV ECHO MEAS - TR MAX PG: 25.4 MMHG
BH CV ECHO MEAS - TR MAX VEL: 252 CM/SEC
BH CV ECHO MEASUREMENTS AVERAGE E/E' RATIO: 9.51
BH CV XLRA - RV BASE: 2.7 CM
BH CV XLRA - RV LENGTH: 5.5 CM
BH CV XLRA - RV MID: 1.7 CM
BH CV XLRA - TDI S': 10.6 CM/SEC
BILIRUB SERPL-MCNC: 0.4 MG/DL (ref 0–1.2)
BILIRUB UR QL STRIP: NEGATIVE
BUN SERPL-MCNC: 24 MG/DL (ref 8–23)
BUN/CREAT SERPL: 26.4 (ref 7–25)
CALCIUM SPEC-SCNC: 8 MG/DL (ref 8.6–10.5)
CHLORIDE SERPL-SCNC: 109 MMOL/L (ref 98–107)
CHOLEST SERPL-MCNC: 191 MG/DL (ref 0–200)
CK SERPL-CCNC: 261 U/L (ref 20–180)
CLARITY UR: CLEAR
CO2 SERPL-SCNC: 22 MMOL/L (ref 22–29)
COLOR UR: YELLOW
CREAT SERPL-MCNC: 0.91 MG/DL (ref 0.57–1)
DEPRECATED RDW RBC AUTO: 48.1 FL (ref 37–54)
EGFRCR SERPLBLD CKD-EPI 2021: 61.6 ML/MIN/1.73
EOSINOPHIL SPEC QL MICRO: 0 % EOS/100 CELLS (ref 0–0)
ERYTHROCYTE [DISTWIDTH] IN BLOOD BY AUTOMATED COUNT: 14.2 % (ref 12.3–15.4)
GLOBULIN UR ELPH-MCNC: 2.7 GM/DL
GLUCOSE BLDC GLUCOMTR-MCNC: 107 MG/DL (ref 70–105)
GLUCOSE BLDC GLUCOMTR-MCNC: 133 MG/DL (ref 70–105)
GLUCOSE BLDC GLUCOMTR-MCNC: 168 MG/DL (ref 70–105)
GLUCOSE BLDC GLUCOMTR-MCNC: 83 MG/DL (ref 70–105)
GLUCOSE BLDC GLUCOMTR-MCNC: 94 MG/DL (ref 70–105)
GLUCOSE SERPL-MCNC: 90 MG/DL (ref 65–99)
GLUCOSE UR STRIP-MCNC: NEGATIVE MG/DL
HBA1C MFR BLD: 5.8 % (ref 4.8–5.6)
HCT VFR BLD AUTO: 35.3 % (ref 34–46.6)
HDLC SERPL-MCNC: 63 MG/DL (ref 40–60)
HGB BLD-MCNC: 11.9 G/DL (ref 12–15.9)
HGB UR QL STRIP.AUTO: NEGATIVE
HYALINE CASTS UR QL AUTO: ABNORMAL /LPF
KETONES UR QL STRIP: NEGATIVE
LDLC SERPL CALC-MCNC: 116 MG/DL (ref 0–100)
LDLC/HDLC SERPL: 1.82 {RATIO}
LEFT ATRIUM VOLUME INDEX: 21.4 ML/M2
LEUKOCYTE ESTERASE UR QL STRIP.AUTO: NEGATIVE
MAGNESIUM SERPL-MCNC: 1.9 MG/DL (ref 1.6–2.4)
MCH RBC QN AUTO: 30.8 PG (ref 26.6–33)
MCHC RBC AUTO-ENTMCNC: 33.6 G/DL (ref 31.5–35.7)
MCV RBC AUTO: 91.6 FL (ref 79–97)
NITRITE UR QL STRIP: POSITIVE
PH UR STRIP.AUTO: 5.5 [PH] (ref 5–8)
PHOSPHATE SERPL-MCNC: 3.5 MG/DL (ref 2.5–4.5)
PLATELET # BLD AUTO: 460 10*3/MM3 (ref 140–450)
PMV BLD AUTO: 7 FL (ref 6–12)
POTASSIUM SERPL-SCNC: 3.8 MMOL/L (ref 3.5–5.2)
PROT SERPL-MCNC: 5.6 G/DL (ref 6–8.5)
PROT UR QL STRIP: NEGATIVE
RBC # BLD AUTO: 3.85 10*6/MM3 (ref 3.77–5.28)
RBC # UR STRIP: ABNORMAL /HPF
REF LAB TEST METHOD: ABNORMAL
SODIUM SERPL-SCNC: 141 MMOL/L (ref 136–145)
SODIUM UR-SCNC: 65 MMOL/L
SP GR UR STRIP: 1.01 (ref 1–1.03)
SQUAMOUS #/AREA URNS HPF: ABNORMAL /HPF
TRIGL SERPL-MCNC: 67 MG/DL (ref 0–150)
TROPONIN T SERPL HS-MCNC: 160 NG/L
TSH SERPL DL<=0.05 MIU/L-ACNC: 1.36 UIU/ML (ref 0.27–4.2)
URATE SERPL-MCNC: 6.1 MG/DL (ref 2.4–5.7)
UROBILINOGEN UR QL STRIP: ABNORMAL
VLDLC SERPL-MCNC: 12 MG/DL (ref 5–40)
WBC # UR STRIP: ABNORMAL /HPF
WBC NRBC COR # BLD: 11.4 10*3/MM3 (ref 3.4–10.8)

## 2023-11-03 PROCEDURE — 87186 SC STD MICRODIL/AGAR DIL: CPT | Performed by: HOSPITALIST

## 2023-11-03 PROCEDURE — 93005 ELECTROCARDIOGRAM TRACING: CPT | Performed by: FAMILY MEDICINE

## 2023-11-03 PROCEDURE — 97166 OT EVAL MOD COMPLEX 45 MIN: CPT

## 2023-11-03 PROCEDURE — 87077 CULTURE AEROBIC IDENTIFY: CPT | Performed by: HOSPITALIST

## 2023-11-03 PROCEDURE — 25810000003 SODIUM CHLORIDE 0.9 % SOLUTION: Performed by: STUDENT IN AN ORGANIZED HEALTH CARE EDUCATION/TRAINING PROGRAM

## 2023-11-03 PROCEDURE — 84484 ASSAY OF TROPONIN QUANT: CPT | Performed by: HOSPITALIST

## 2023-11-03 PROCEDURE — 94761 N-INVAS EAR/PLS OXIMETRY MLT: CPT

## 2023-11-03 PROCEDURE — 84300 ASSAY OF URINE SODIUM: CPT | Performed by: INTERNAL MEDICINE

## 2023-11-03 PROCEDURE — 63710000001 DIPHENHYDRAMINE PER 50 MG: Performed by: FAMILY MEDICINE

## 2023-11-03 PROCEDURE — 83036 HEMOGLOBIN GLYCOSYLATED A1C: CPT | Performed by: FAMILY MEDICINE

## 2023-11-03 PROCEDURE — 99222 1ST HOSP IP/OBS MODERATE 55: CPT | Performed by: INTERNAL MEDICINE

## 2023-11-03 PROCEDURE — 93010 ELECTROCARDIOGRAM REPORT: CPT | Performed by: INTERNAL MEDICINE

## 2023-11-03 PROCEDURE — 87205 SMEAR GRAM STAIN: CPT | Performed by: INTERNAL MEDICINE

## 2023-11-03 PROCEDURE — 80061 LIPID PANEL: CPT | Performed by: FAMILY MEDICINE

## 2023-11-03 PROCEDURE — 82550 ASSAY OF CK (CPK): CPT | Performed by: HOSPITALIST

## 2023-11-03 PROCEDURE — 80053 COMPREHEN METABOLIC PANEL: CPT | Performed by: FAMILY MEDICINE

## 2023-11-03 PROCEDURE — 94640 AIRWAY INHALATION TREATMENT: CPT

## 2023-11-03 PROCEDURE — 83735 ASSAY OF MAGNESIUM: CPT | Performed by: FAMILY MEDICINE

## 2023-11-03 PROCEDURE — 87086 URINE CULTURE/COLONY COUNT: CPT | Performed by: HOSPITALIST

## 2023-11-03 PROCEDURE — 94799 UNLISTED PULMONARY SVC/PX: CPT

## 2023-11-03 PROCEDURE — 81001 URINALYSIS AUTO W/SCOPE: CPT | Performed by: HOSPITALIST

## 2023-11-03 PROCEDURE — 97162 PT EVAL MOD COMPLEX 30 MIN: CPT

## 2023-11-03 PROCEDURE — 93306 TTE W/DOPPLER COMPLETE: CPT

## 2023-11-03 PROCEDURE — 25010000002 CALCIUM GLUCONATE-NACL 1-0.675 GM/50ML-% SOLUTION: Performed by: INTERNAL MEDICINE

## 2023-11-03 PROCEDURE — 84550 ASSAY OF BLOOD/URIC ACID: CPT | Performed by: INTERNAL MEDICINE

## 2023-11-03 PROCEDURE — 84443 ASSAY THYROID STIM HORMONE: CPT | Performed by: INTERNAL MEDICINE

## 2023-11-03 PROCEDURE — 82948 REAGENT STRIP/BLOOD GLUCOSE: CPT

## 2023-11-03 PROCEDURE — 25010000002 ENOXAPARIN PER 10 MG: Performed by: HOSPITALIST

## 2023-11-03 PROCEDURE — 85027 COMPLETE CBC AUTOMATED: CPT | Performed by: HOSPITALIST

## 2023-11-03 PROCEDURE — 25010000002 ENOXAPARIN PER 10 MG: Performed by: FAMILY MEDICINE

## 2023-11-03 PROCEDURE — 93306 TTE W/DOPPLER COMPLETE: CPT | Performed by: INTERNAL MEDICINE

## 2023-11-03 PROCEDURE — 25010000002 CEFTRIAXONE PER 250 MG: Performed by: FAMILY MEDICINE

## 2023-11-03 PROCEDURE — 84100 ASSAY OF PHOSPHORUS: CPT | Performed by: FAMILY MEDICINE

## 2023-11-03 PROCEDURE — 76775 US EXAM ABDO BACK WALL LIM: CPT

## 2023-11-03 RX ORDER — BISACODYL 10 MG
10 SUPPOSITORY, RECTAL RECTAL DAILY PRN
Status: DISCONTINUED | OUTPATIENT
Start: 2023-11-03 | End: 2023-11-03

## 2023-11-03 RX ORDER — BUDESONIDE 0.5 MG/2ML
0.5 INHALANT ORAL
Status: DISCONTINUED | OUTPATIENT
Start: 2023-11-03 | End: 2023-11-06 | Stop reason: HOSPADM

## 2023-11-03 RX ORDER — INSULIN LISPRO 100 [IU]/ML
2-7 INJECTION, SOLUTION INTRAVENOUS; SUBCUTANEOUS
Status: DISCONTINUED | OUTPATIENT
Start: 2023-11-03 | End: 2023-11-03

## 2023-11-03 RX ORDER — SODIUM CHLORIDE 0.9 % (FLUSH) 0.9 %
10 SYRINGE (ML) INJECTION AS NEEDED
Status: DISCONTINUED | OUTPATIENT
Start: 2023-11-03 | End: 2023-11-06 | Stop reason: HOSPADM

## 2023-11-03 RX ORDER — MONTELUKAST SODIUM 10 MG/1
10 TABLET ORAL NIGHTLY
Status: DISCONTINUED | OUTPATIENT
Start: 2023-11-03 | End: 2023-11-06 | Stop reason: HOSPADM

## 2023-11-03 RX ORDER — BISACODYL 5 MG/1
5 TABLET, DELAYED RELEASE ORAL DAILY PRN
Status: DISCONTINUED | OUTPATIENT
Start: 2023-11-03 | End: 2023-11-03

## 2023-11-03 RX ORDER — POLYETHYLENE GLYCOL 3350 17 G/17G
17 POWDER, FOR SOLUTION ORAL DAILY PRN
Status: DISCONTINUED | OUTPATIENT
Start: 2023-11-03 | End: 2023-11-03

## 2023-11-03 RX ORDER — ONDANSETRON 2 MG/ML
4 INJECTION INTRAMUSCULAR; INTRAVENOUS EVERY 4 HOURS PRN
Status: DISCONTINUED | OUTPATIENT
Start: 2023-11-03 | End: 2023-11-06 | Stop reason: HOSPADM

## 2023-11-03 RX ORDER — SODIUM CHLORIDE 9 MG/ML
40 INJECTION, SOLUTION INTRAVENOUS AS NEEDED
Status: DISCONTINUED | OUTPATIENT
Start: 2023-11-03 | End: 2023-11-06 | Stop reason: HOSPADM

## 2023-11-03 RX ORDER — CALCIUM GLUCONATE 20 MG/ML
1000 INJECTION, SOLUTION INTRAVENOUS EVERY 12 HOURS
Status: COMPLETED | OUTPATIENT
Start: 2023-11-03 | End: 2023-11-03

## 2023-11-03 RX ORDER — ENOXAPARIN SODIUM 100 MG/ML
30 INJECTION SUBCUTANEOUS DAILY
Status: DISCONTINUED | OUTPATIENT
Start: 2023-11-03 | End: 2023-11-03

## 2023-11-03 RX ORDER — DEXTROSE MONOHYDRATE 25 G/50ML
25 INJECTION, SOLUTION INTRAVENOUS
Status: DISCONTINUED | OUTPATIENT
Start: 2023-11-03 | End: 2023-11-06 | Stop reason: HOSPADM

## 2023-11-03 RX ORDER — GABAPENTIN 300 MG/1
300 CAPSULE ORAL NIGHTLY
Status: DISCONTINUED | OUTPATIENT
Start: 2023-11-03 | End: 2023-11-06 | Stop reason: HOSPADM

## 2023-11-03 RX ORDER — ACETAMINOPHEN 325 MG/1
650 TABLET ORAL EVERY 4 HOURS PRN
Status: DISCONTINUED | OUTPATIENT
Start: 2023-11-03 | End: 2023-11-06 | Stop reason: HOSPADM

## 2023-11-03 RX ORDER — DIPHENHYDRAMINE HCL 25 MG
25 CAPSULE ORAL EVERY 4 HOURS PRN
Status: DISCONTINUED | OUTPATIENT
Start: 2023-11-03 | End: 2023-11-06 | Stop reason: HOSPADM

## 2023-11-03 RX ORDER — FAMOTIDINE 20 MG/1
20 TABLET, FILM COATED ORAL DAILY
Status: DISCONTINUED | OUTPATIENT
Start: 2023-11-03 | End: 2023-11-03

## 2023-11-03 RX ORDER — ENOXAPARIN SODIUM 100 MG/ML
40 INJECTION SUBCUTANEOUS EVERY 24 HOURS
Status: DISCONTINUED | OUTPATIENT
Start: 2023-11-03 | End: 2023-11-06 | Stop reason: HOSPADM

## 2023-11-03 RX ORDER — METOPROLOL SUCCINATE 25 MG/1
25 TABLET, EXTENDED RELEASE ORAL DAILY
Status: DISCONTINUED | OUTPATIENT
Start: 2023-11-03 | End: 2023-11-06 | Stop reason: HOSPADM

## 2023-11-03 RX ORDER — IBUPROFEN 600 MG/1
1 TABLET ORAL
Status: DISCONTINUED | OUTPATIENT
Start: 2023-11-03 | End: 2023-11-06 | Stop reason: HOSPADM

## 2023-11-03 RX ORDER — ALUMINA, MAGNESIA, AND SIMETHICONE 2400; 2400; 240 MG/30ML; MG/30ML; MG/30ML
15 SUSPENSION ORAL EVERY 6 HOURS PRN
Status: DISCONTINUED | OUTPATIENT
Start: 2023-11-03 | End: 2023-11-06 | Stop reason: HOSPADM

## 2023-11-03 RX ORDER — CETIRIZINE HYDROCHLORIDE 10 MG/1
10 TABLET ORAL DAILY
Status: DISCONTINUED | OUTPATIENT
Start: 2023-11-03 | End: 2023-11-06 | Stop reason: HOSPADM

## 2023-11-03 RX ORDER — SODIUM CHLORIDE 9 MG/ML
75 INJECTION, SOLUTION INTRAVENOUS CONTINUOUS
Status: DISCONTINUED | OUTPATIENT
Start: 2023-11-03 | End: 2023-11-04

## 2023-11-03 RX ORDER — ISOSORBIDE MONONITRATE 30 MG/1
30 TABLET, EXTENDED RELEASE ORAL DAILY
Status: DISCONTINUED | OUTPATIENT
Start: 2023-11-03 | End: 2023-11-06 | Stop reason: HOSPADM

## 2023-11-03 RX ORDER — SODIUM CHLORIDE 0.9 % (FLUSH) 0.9 %
10 SYRINGE (ML) INJECTION EVERY 12 HOURS SCHEDULED
Status: DISCONTINUED | OUTPATIENT
Start: 2023-11-03 | End: 2023-11-03

## 2023-11-03 RX ORDER — NICOTINE POLACRILEX 4 MG
15 LOZENGE BUCCAL
Status: DISCONTINUED | OUTPATIENT
Start: 2023-11-03 | End: 2023-11-06 | Stop reason: HOSPADM

## 2023-11-03 RX ORDER — IPRATROPIUM BROMIDE AND ALBUTEROL SULFATE 2.5; .5 MG/3ML; MG/3ML
3 SOLUTION RESPIRATORY (INHALATION)
Status: DISCONTINUED | OUTPATIENT
Start: 2023-11-03 | End: 2023-11-06 | Stop reason: HOSPADM

## 2023-11-03 RX ORDER — METOPROLOL SUCCINATE 25 MG/1
25 TABLET, EXTENDED RELEASE ORAL DAILY
COMMUNITY

## 2023-11-03 RX ORDER — ASPIRIN 81 MG/1
81 TABLET ORAL DAILY
Status: DISCONTINUED | OUTPATIENT
Start: 2023-11-03 | End: 2023-11-06 | Stop reason: HOSPADM

## 2023-11-03 RX ORDER — ONDANSETRON 4 MG/1
4 TABLET, FILM COATED ORAL EVERY 4 HOURS PRN
Status: DISCONTINUED | OUTPATIENT
Start: 2023-11-03 | End: 2023-11-06 | Stop reason: HOSPADM

## 2023-11-03 RX ORDER — SODIUM CHLORIDE 0.9 % (FLUSH) 0.9 %
10 SYRINGE (ML) INJECTION EVERY 12 HOURS SCHEDULED
Status: DISCONTINUED | OUTPATIENT
Start: 2023-11-03 | End: 2023-11-06 | Stop reason: HOSPADM

## 2023-11-03 RX ORDER — ONDANSETRON 2 MG/ML
4 INJECTION INTRAMUSCULAR; INTRAVENOUS EVERY 6 HOURS PRN
Status: DISCONTINUED | OUTPATIENT
Start: 2023-11-03 | End: 2023-11-03

## 2023-11-03 RX ORDER — NITROGLYCERIN 0.4 MG/1
0.4 TABLET SUBLINGUAL
Status: DISCONTINUED | OUTPATIENT
Start: 2023-11-03 | End: 2023-11-06 | Stop reason: HOSPADM

## 2023-11-03 RX ORDER — CALCIUM CARBONATE 500 MG/1
2 TABLET, CHEWABLE ORAL 2 TIMES DAILY PRN
Status: DISCONTINUED | OUTPATIENT
Start: 2023-11-03 | End: 2023-11-06 | Stop reason: HOSPADM

## 2023-11-03 RX ORDER — AMOXICILLIN 250 MG
2 CAPSULE ORAL 2 TIMES DAILY
Status: DISCONTINUED | OUTPATIENT
Start: 2023-11-03 | End: 2023-11-03

## 2023-11-03 RX ADMIN — CEFTRIAXONE 1000 MG: 1 INJECTION, POWDER, FOR SOLUTION INTRAMUSCULAR; INTRAVENOUS at 20:39

## 2023-11-03 RX ADMIN — Medication 10 ML: at 01:50

## 2023-11-03 RX ADMIN — MONTELUKAST 10 MG: 10 TABLET, FILM COATED ORAL at 20:31

## 2023-11-03 RX ADMIN — ISOSORBIDE MONONITRATE 30 MG: 30 TABLET, EXTENDED RELEASE ORAL at 08:30

## 2023-11-03 RX ADMIN — IPRATROPIUM BROMIDE AND ALBUTEROL SULFATE 3 ML: .5; 3 SOLUTION RESPIRATORY (INHALATION) at 11:05

## 2023-11-03 RX ADMIN — ENOXAPARIN SODIUM 40 MG: 40 INJECTION, SOLUTION SUBCUTANEOUS at 17:18

## 2023-11-03 RX ADMIN — SODIUM CHLORIDE 125 ML/HR: 9 INJECTION, SOLUTION INTRAVENOUS at 03:41

## 2023-11-03 RX ADMIN — Medication 10 ML: at 08:29

## 2023-11-03 RX ADMIN — Medication 10 ML: at 20:31

## 2023-11-03 RX ADMIN — BUDESONIDE 0.5 MG: 0.5 INHALANT RESPIRATORY (INHALATION) at 19:55

## 2023-11-03 RX ADMIN — Medication 10 ML: at 12:52

## 2023-11-03 RX ADMIN — CETIRIZINE HYDROCHLORIDE 10 MG: 10 TABLET, FILM COATED ORAL at 08:30

## 2023-11-03 RX ADMIN — IPRATROPIUM BROMIDE AND ALBUTEROL SULFATE 3 ML: .5; 3 SOLUTION RESPIRATORY (INHALATION) at 19:50

## 2023-11-03 RX ADMIN — GABAPENTIN 300 MG: 300 CAPSULE ORAL at 20:31

## 2023-11-03 RX ADMIN — BUDESONIDE 0.5 MG: 0.5 INHALANT RESPIRATORY (INHALATION) at 11:00

## 2023-11-03 RX ADMIN — CALCIUM GLUCONATE 1000 MG: 20 INJECTION, SOLUTION INTRAVENOUS at 12:52

## 2023-11-03 RX ADMIN — DIPHENHYDRAMINE HYDROCHLORIDE 25 MG: 25 CAPSULE ORAL at 17:18

## 2023-11-03 RX ADMIN — FAMOTIDINE 20 MG: 20 TABLET, FILM COATED ORAL at 12:52

## 2023-11-03 RX ADMIN — ENOXAPARIN SODIUM 30 MG: 100 INJECTION SUBCUTANEOUS at 01:50

## 2023-11-03 RX ADMIN — CALCIUM GLUCONATE 1000 MG: 20 INJECTION, SOLUTION INTRAVENOUS at 23:26

## 2023-11-03 RX ADMIN — ASPIRIN 81 MG: 81 TABLET, COATED ORAL at 08:30

## 2023-11-03 NOTE — CASE MANAGEMENT/SOCIAL WORK
Discharge Planning Assessment  [facility]     Patient Name: Rowan Guzman  MRN: [id number]  Today's Date: 11/3/2023    Admit Date: 11/2/2023    Plan: DC Plan: Referral sent to [facility] (pending acceptance)  No precert needed. Will need PASRR.  Possible transition to assisted after rehab complete.   Discharge Needs Assessment       Row Name 11/03/23 1408       Living Environment    People in Home alone    Current Living Arrangements home    Potentially Unsafe Housing Conditions none    Primary Care Provided by self    Provides Primary Care For no one    Family Caregiver if Needed child(edilma), adult    Quality of Family Relationships helpful;involved;supportive    Able to Return to Prior Arrangements yes       Resource/Environmental Concerns    Resource/Environmental Concerns none    Transportation Concerns none       Transition Planning    Patient/Family Anticipates Transition to other (see comments)  SNF then [facility]    Patient/Family Anticipated Services at Transition none    Transportation Anticipated family or friend will provide;health plan transportation       Discharge Needs Assessment    Readmission Within the Last 30 Days no previous admission in last 30 days    Equipment Currently Used at Home walker, rolling;cane, quad    Anticipated Changes Related to Illness inability to care for self    Equipment Needed After Discharge none                   Discharge Plan       Row Name 11/03/23 7780       Plan    Plan DC Plan: Referral sent to [facility] (pending acceptance)  No precert needed. Will need PASRR.  Possible transition to [facility] after rehab complete.    Patient/Family in Agreement with Plan yes    Plan Comments Met with patient, son, and DIL at bedside.   Discussed potential needs for SNF on DC.  SNF list given.   Chose [facility], referral sent, pending acceptance.  No precert needed. Will need PASRR.  Plan for assisted after completes rehab.   Assisted Living list given.  Notified [facility] of plans for [facility] after  SNF.  At [facility] lives home alone, but patient and family are in agreement that this is no longer an option.   Verified PCP.  Will update Pharmacy once facility accepted.  Barriers to discharge: Rhbdo.  IV Fluids. IV abx.                  Continued Care and Services - Admitted Since 11/2/2023       Destination       Service Provider Request Status Selected Services Address Phone Fax Patient Preferred    [facility] Pending - Request Sent N/A [address] [place] [address] [phone] --                  Expected Discharge Date and Time       Expected Discharge Date Expected Discharge Time    Nov 6, 2023            Demographic Summary       Row Name 11/03/23 1407       General Information    Admission Type inpatient    Arrived From emergency department    Required Notices Provided Important Message from Medicare    Referral Source admission list    Reason for Consult discharge planning    Preferred Language English                   Functional Status       Row Name 11/03/23 1407       Functional Status    Usual Activity Tolerance fair    Current Activity Tolerance fair       Functional Status, IADL    Medications assistive person    Meal Preparation assistive person    Housekeeping assistive person;assistive equipment and person    Laundry assistive equipment and person    Shopping assistive equipment and person       Mental Status    General Appearance WDL WDL       Mental Status Summary    Recent Changes in Mental Status/Cognitive Functioning no changes                Met with patient in room.    Maintained distance greater than six feet and spent less than 15 minutes in the room.  Edwina Bethea RN     Office Phone [phone]  Office Cell [phone]

## 2023-11-03 NOTE — CONSULTS
Referring Provider: Willa Martinez MD    Reason for Consultation: Elevated troponin      Patient Care Team:  Willa Martinez MD as PCP - General  Willa Martinez MD as PCP - Family Medicine  Amor López MD as Consulting Physician (Cardiology)  Teddy Francis MD as Consulting Physician (Nephrology)      SUBJECTIVE     Chief Complaint: Fall    History of present illness:  Rowan Guzman is a 88 y.o. female with a history of dementia, CAD status post PCI of the RCA, hypertension, hyperlipidemia who presented to [facility] with complaint of fall.  She said she was sitting at home when she fell from her chair.  Denies any chest pain or shortness of breath.  She was able to call her son who sent his wife to check on her.  She was taken to the ER.  There she was found to have a CK level of 422 and a creatinine of 1.97 with a troponin of 338.  EKG did not show any ischemic changes.  She denied any chest pain.  For this reason she was admitted.  She was started on hydration with concern for rhabdo.  Troponins have continued to trend down to 245 and then 160.  She remains chest pain-free.  Renal function improved with hydration.  When I saw her she was laying down in bed and denied any chest pain or shortness of breath.      Review of systems:    Constitutional: + weakness, fatigue, fever, rigors, chills   Eyes: No vision changes, eye pain   ENT/oropharynx: No difficulty swallowing, sore throat, epistaxis, changes in hearing   Cardiovascular: No chest pain, chest tightness, palpitations, paroxysmal nocturnal dyspnea, orthopnea, diaphoresis, dizziness / syncopal episode   Respiratory: No shortness of breath, dyspnea on exertion, cough, wheezing, hemoptysis   Gastrointestinal: No abdominal pain, nausea, vomiting, diarrhea, bloody stools   Genitourinary: No hematuria, dysuria   Neurological: No headache, tremors, numbness, one-sided weakness    Musculoskeletal: No cramps, myalgias, joint  pain, joint swelling   Integument: No rash, edema        Personal History:      Past Medical History:   Diagnosis Date    Asthma     Diabetes mellitus     type 2    Heart disease     Hyperlipidemia        Past Surgical History:   Procedure Laterality Date    ABDOMINAL SURGERY      cleaned adhesions    APPENDECTOMY      BREAST LUMPECTOMY Right     CARDIAC CATHETERIZATION  06/10/2015    COLONOSCOPY      CORONARY ANGIOPLASTY WITH STENT PLACEMENT  2017    stent to mid rca    SKIN CANCER EXCISION      melanoma removal    THYMECTOMY Left 2018    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 Authorizing Provider   isosorbide mononitrate (IMDUR) 30 MG 24 hr tablet TAKE 1 TABLET BY MOUTH EVERY DAY 22   Amor López MD   aspirin (aspirin) 81 MG EC tablet Take 1 tablet by mouth Daily. 14   Emergency, Nurse Manuel RN   budesonide (PULMICORT) 0.5 MG/2ML nebulizer solution Take 2 mL by nebulization 2 (Two) Times a Day. 14   Emergency, Nurse Manuel RN   cetirizine (zyrTEC) 10 MG tablet Take 1 tablet by mouth Daily. 3/26/19   Emergency, Nurse Manuel RN   gabapentin (NEURONTIN) 300 MG capsule Take 1 capsule by mouth every night at bedtime. 19   Emergency, Nurse Manuel RN   ipratropium-albuterol (DUO-NEB) 0.5-2.5 mg/3 ml nebulizer Take 3 mL by nebulization 4 (Four) Times a Day. 14   Emergency, Nurse Manuel RN   metoprolol succinate XL (TOPROL-XL) 25  MG 24 hr tablet Take 1 tablet by mouth Daily.    Provider, MD Dali   montelukast (SINGULAIR) 10 MG tablet Take 1 tablet by mouth Every Night. 6/29/19   Emergency, Nurse Manuel, RN   alendronate (FOSAMAX) 70 MG tablet  6/29/19 11/3/23  Emergency, Nurse Manuel, RN   atorvastatin (LIPITOR) 40 MG tablet  6/29/19 11/3/23  Emergency, Nurse Epic, RN   FA-Pyridoxine-Cyanocobalamin (FOLBIC PO) FOLBIC TABS 5/16/16 11/3/23  Emergency, Nurse Manuel, RN   fluconazole (DIFLUCAN) 200 MG tablet Take 1 tablet by mouth Daily. 8/2/21 11/3/23  Jan Beebe APRN   metoprolol succinate XL (TOPROL-XL) 25 MG 24 hr tablet TAKE 1 TABLET BY MOUTH EVERY DAY 10/6/23 11/3/23  Amor López MD   Multiple Vitamin (MULTI-DAY VITAMINS) tablet MULTI-DAY VITAMINS TABS 6/22/14 11/3/23  Provider, MD Dali   PREMARIN 0.625 MG/GM vaginal cream  7/19/19 11/3/23  Provider, MD Dali   PROAIR  (90 Base) MCG/ACT inhaler  5/7/19 11/3/23  Emergency, Nurse Manuel, RN   vitamin C (ASCORBIC ACID) 500 MG tablet VITAMIN C 500 MG TABS 9/1/17 11/3/23  Emergency, Nurse Manuel, RN       Allergies:     Penicillins and Sulfa antibiotics    Scheduled Meds:aspirin, 81 mg, Oral, Daily  budesonide, 0.5 mg, Nebulization, BID - RT  calcium gluconate, 1,000 mg, Intravenous, Q12H  cefTRIAXone, 1,000 mg, Intravenous, Q24H  cetirizine, 10 mg, Oral, Daily  enoxaparin, 40 mg, Subcutaneous, Q24H  gabapentin, 300 mg, Oral, Nightly  insulin lispro, 2-7 Units, Subcutaneous, 4x Daily AC & at Bedtime  ipratropium-albuterol, 3 mL, Nebulization, TID - RT  isosorbide mononitrate, 30 mg, Oral, Daily  metoprolol succinate XL, 25 mg, Oral, Daily  montelukast, 10 mg, Oral, Nightly  sodium chloride, 10 mL, Intravenous, Q12H  sodium chloride, 10 mL, Intravenous, Q12H      Continuous Infusions:Pharmacy to Dose enoxaparin (LOVENOX),   sodium chloride, 75 mL/hr, Last Rate: 75 mL/hr (11/03/23 0536)      PRN Meds:  acetaminophen    aluminum-magnesium  "hydroxide-simethicone    calcium carbonate    dextrose    dextrose    diphenhydrAMINE    glucagon (human recombinant)    nitroglycerin    ondansetron **OR** ondansetron    Pharmacy to Dose enoxaparin (LOVENOX)    [COMPLETED] Insert peripheral IV **AND** sodium chloride    sodium chloride    sodium chloride    sodium chloride    sodium chloride      OBJECTIVE    Vital Signs  Vitals:    11/03/23 1104 11/03/23 1105 11/03/23 1109 11/03/23 1211   BP:    161/62   BP Location:    Right arm   Patient Position:    Sitting   Pulse: 79 89 86 106   Resp: 18 18 18 17   Temp:    97.4 °F (36.3 °C)   TempSrc:    Oral   SpO2: 97% 93% 98%    Weight:       Height:           Flowsheet Rows      Flowsheet Row First Filed Value   Admission Height 162.6 cm (64\") Documented at 11/02/2023 1523   Admission Weight 68 kg (150 lb) Documented at 11/02/2023 1523              Intake/Output Summary (Last 24 hours) at 11/3/2023 1411  Last data filed at 11/3/2023 1109  Gross per 24 hour   Intake 2240 ml   Output 200 ml   Net 2040 ml        Telemetry: Normal sinus rhythm    Physical Exam:  The patient is alert, oriented to herself  Vital signs as noted above.  Head and neck revealed no carotid bruits or jugular venous distention.  No thyromegaly or lymphadenopathy is present  Lungs clear.  No wheezing.  Breath sounds are normal bilaterally.  Heart: Normal first and second heart sounds. No murmur.  No precordial rub is present.  No gallop is present.  Abdomen: Soft and nontender.  No organomegaly is present.  Extremities with good peripheral pulses without any pedal edema.  Mild tenderness to palpation  Skin: Warm and dry.  Musculoskeletal system is grossly normal.  CNS grossly normal.       Results Review:  I have personally reviewed the results from the time of this admission to 11/3/2023 14:11 EDT and agree with these findings:  []  Laboratory  []  Microbiology  []  Radiology  []  EKG/Telemetry   []  Cardiology/Vascular   []  Pathology  []  Old " records  []  Other:    Most notable findings include:     Lab Results (last 24 hours)       Procedure Component Value Units Date/Time    Eosinophil Smear - Urine, Urine, Clean Catch [[id number]]  (Normal) Collected: 11/03/23 1042    Specimen: Urine, Clean Catch Updated: 11/03/23 1218     Eosinophil Smear 0 % EOS/100 Cells     POC Glucose Once [[id number]]  (Abnormal) Collected: 11/03/23 1215    Specimen: Blood Updated: 11/03/23 1216     Glucose 133 mg/dL      Comment: Serial Number: [id number]:  [id number]       POC Glucose Once [[id number]]  (Normal) Collected: 11/03/23 1117    Specimen: Blood Updated: 11/03/23 1118     Glucose 94 mg/dL      Comment: Serial Number: [id number]:  [id number]       Sodium, Urine, Random - Urine, Clean Catch [[id number]] Collected: 11/03/23 1042    Specimen: Urine, Clean Catch Updated: 11/03/23 1114    Urinalysis, Microscopic Only - Urine, Clean Catch [[id number]]  (Abnormal) Collected: 11/03/23 1042    Specimen: Urine, Clean Catch Updated: 11/03/23 1055     RBC, UA 0-2 /HPF      WBC, UA 11-20 /HPF      Bacteria, UA 4+ /HPF      Squamous Epithelial Cells, UA 0-2 /HPF      Hyaline Casts, UA 0-2 /LPF      Methodology Automated Microscopy    Urinalysis With Culture If Indicated - Urine, Clean Catch [[id number]]  (Abnormal) Collected: 11/03/23 1042    Specimen: Urine, Clean Catch Updated: 11/03/23 1055     Color, UA Yellow     Appearance, UA Clear     pH, UA 5.5     Specific Gravity, UA 1.013     Glucose, UA Negative     Ketones, UA Negative     Bilirubin, UA Negative     Blood, UA Negative     Protein, UA Negative     Leuk Esterase, UA Negative     Nitrite, UA Positive     Urobilinogen, UA 0.2 E.U./dL    Narrative:      In absence of clinical symptoms, the presence of pyuria, bacteria, and/or nitrites on the urinalysis result does not correlate with infection.    Urine Culture - Urine, Urine, Clean Catch [[id number]] Collected: 11/03/23 1042    Specimen: Urine, Clean Catch Updated:  11/03/23 1055    Uric Acid [[id number]]  (Abnormal) Collected: 11/03/23 0626    Specimen: Blood Updated: 11/03/23 1002     Uric Acid 6.1 mg/dL     Hemoglobin A1c [[id number]]  (Abnormal) Collected: 11/03/23 0626    Specimen: Blood Updated: 11/03/23 0941     Hemoglobin A1C 5.80 %     TSH [[id number]]  (Normal) Collected: 11/03/23 0626    Specimen: Blood Updated: 11/03/23 0841     TSH 1.360 uIU/mL     Comprehensive Metabolic Panel [[id number]]  (Abnormal) Collected: 11/03/23 0626    Specimen: Blood Updated: 11/03/23 0748     Glucose 90 mg/dL      BUN 24 mg/dL      Creatinine 0.91 mg/dL      Sodium 141 mmol/L      Potassium 3.8 mmol/L      Comment: Slight hemolysis detected by analyzer. Results may be affected.        Chloride 109 mmol/L      CO2 22.0 mmol/L      Calcium 8.0 mg/dL      Total Protein 5.6 g/dL      Albumin 2.9 g/dL      ALT (SGPT) 14 U/L      AST (SGOT) 19 U/L      Alkaline Phosphatase 74 U/L      Total Bilirubin 0.4 mg/dL      Globulin 2.7 gm/dL      A/G Ratio 1.1 g/dL      BUN/Creatinine Ratio 26.4     Anion Gap 10.0 mmol/L      eGFR 61.6 mL/min/1.73     Narrative:      GFR Normal >60  Chronic Kidney Disease <60  Kidney Failure <15    The GFR formula is only valid for adults with stable renal function between ages 18 and 70.    High Sensitivity Troponin T [[id number]]  (Abnormal) Collected: 11/03/23 0626    Specimen: Blood Updated: 11/03/23 0747     HS Troponin T 160 ng/L     Narrative:      High Sensitive Troponin T Reference Range:  <10.0 ng/L- Negative Female for AMI  <15.0 ng/L- Negative Male for AMI  >=10 - Abnormal Female indicating possible myocardial injury.  >=15 - Abnormal Male indicating possible myocardial injury.   Clinicians would have to utilize clinical acumen, EKG, Troponin, and serial changes to determine if it is an Acute Myocardial Infarction or myocardial injury due to an underlying chronic condition.         Magnesium [[id number]]  (Normal) Collected: 11/03/23 0626    Specimen: Blood  Updated: 11/03/23 0739     Magnesium 1.9 mg/dL     CK [[id number]]  (Abnormal) Collected: 11/03/23 0626    Specimen: Blood Updated: 11/03/23 0736     Creatine Kinase 261 U/L     Lipid Panel [[id number]]  (Abnormal) Collected: 11/03/23 0626    Specimen: Blood Updated: 11/03/23 0736     Total Cholesterol 191 mg/dL      Triglycerides 67 mg/dL      HDL Cholesterol 63 mg/dL      LDL Cholesterol  116 mg/dL      VLDL Cholesterol 12 mg/dL      LDL/HDL Ratio 1.82    Narrative:      Cholesterol Reference Ranges  (U.S. Department of Health and Human Services ATP III Classifications)    Desirable          <200 mg/dL  Borderline High    200-239 mg/dL  High Risk          >240 mg/dL      Triglyceride Reference Ranges  (U.S. Department of Health and Human Services ATP III Classifications)    Normal           <150 mg/dL  Borderline High  150-199 mg/dL  High             200-499 mg/dL  Very High        >500 mg/dL    HDL Reference Ranges  (U.S. Department of Health and Human Services ATP III Classifications)    Low     <40 mg/dl (major risk factor for CHD)  High    >60 mg/dl ('negative' risk factor for CHD)        LDL Reference Ranges  (U.S. Department of Health and Human Services ATP III Classifications)    Optimal          <100 mg/dL  Near Optimal     100-129 mg/dL  Borderline High  130-159 mg/dL  High             160-189 mg/dL  Very High        >189 mg/dL    Phosphorus [[id number]]  (Normal) Collected: 11/03/23 0626    Specimen: Blood Updated: 11/03/23 0736     Phosphorus 3.5 mg/dL     POC Glucose Once [[id number]]  (Normal) Collected: 11/03/23 0720    Specimen: Blood Updated: 11/03/23 0722     Glucose 83 mg/dL      Comment: Serial Number: [id number]:  [id number]       CBC (No Diff) [[id number]]  (Abnormal) Collected: 11/03/23 0626    Specimen: Blood Updated: 11/03/23 0712     WBC 11.40 10*3/mm3      RBC 3.85 10*6/mm3      Hemoglobin 11.9 g/dL      Hematocrit 35.3 %      MCV 91.6 fL      MCH 30.8 pg      MCHC 33.6 g/dL      RDW  14.2 %      RDW-SD 48.1 fl      MPV 7.0 fL      Platelets 460 10*3/mm3     POC Glucose Once [[id number]]  (Abnormal) Collected: 11/03/23 0109    Specimen: Blood Updated: 11/03/23 0111     Glucose 107 mg/dL      Comment: Serial Number: [id number]:  [id number]       Urinalysis without microscopic (no culture) - Urine, Clean Catch [[id number]]  (Abnormal) Collected: 11/02/23 2055    Specimen: Urine, Clean Catch Updated: 11/02/23 2059     Color, UA Dark Yellow     Appearance, UA Slightly Cloudy     pH, UA <=5.0     Specific Gravity, UA 1.020     Glucose, UA Negative     Ketones, UA 15 mg/dL (1+)     Bilirubin, UA Moderate (2+)     Blood, UA Trace     Protein, UA 30 mg/dL (1+)     Leuk Esterase, UA Small (1+)     Nitrite, UA Negative     Urobilinogen, UA 0.2 E.U./dL    CK [[id number]]  (Abnormal) Collected: 11/02/23 1550    Specimen: Blood Updated: 11/02/23 1847     Creatine Kinase 422 U/L     High Sensitivity Troponin T 2Hr [[id number]]  (Abnormal) Collected: 11/02/23 1806    Specimen: Blood from Arm, Left Updated: 11/02/23 1840     HS Troponin T 245 ng/L      Troponin T Delta -93 ng/L     Narrative:      High Sensitive Troponin T Reference Range:  <10.0 ng/L- Negative Female for AMI  <15.0 ng/L- Negative Male for AMI  >=10 - Abnormal Female indicating possible myocardial injury.  >=15 - Abnormal Male indicating possible myocardial injury.   Clinicians would have to utilize clinical acumen, EKG, Troponin, and serial changes to determine if it is an Acute Myocardial Infarction or myocardial injury due to an underlying chronic condition.         CBC & Differential [[id number]]  (Abnormal) Collected: 11/02/23 1550    Specimen: Blood Updated: 11/02/23 1623    Narrative:      The following orders were created for panel order CBC & Differential.  Procedure                               Abnormality         Status                     ---------                               -----------         ------                     CBC  Auto Differential[[id number]]        Abnormal            Final result                 Please view results for these tests on the individual orders.    CBC Auto Differential [[id number]]  (Abnormal) Collected: 11/02/23 1550    Specimen: Blood Updated: 11/02/23 1623     WBC 12.47 10*3/mm3      RBC 4.33 10*6/mm3      Hemoglobin 13.1 g/dL      Hematocrit 40.5 %      MCV 93.5 fL      MCH 30.3 pg      MCHC 32.3 g/dL      RDW 13.6 %      RDW-SD 47.1 fl      MPV 8.7 fL      Platelets 529 10*3/mm3      Neutrophil % 74.5 %      Lymphocyte % 15.0 %      Monocyte % 9.5 %      Eosinophil % 0.3 %      Basophil % 0.2 %      Immature Grans % 0.5 %      Neutrophils, Absolute 9.28 10*3/mm3      Lymphocytes, Absolute 1.87 10*3/mm3      Monocytes, Absolute 1.19 10*3/mm3      Eosinophils, Absolute 0.04 10*3/mm3      Basophils, Absolute 0.03 10*3/mm3      Immature Grans, Absolute 0.06 10*3/mm3     Comprehensive Metabolic Panel [[id number]]  (Abnormal) Collected: 11/02/23 1550    Specimen: Blood Updated: 11/02/23 1622     Glucose 142 mg/dL      BUN 26 mg/dL      Creatinine 1.97 mg/dL      Sodium 134 mmol/L      Potassium 3.8 mmol/L      Chloride 99 mmol/L      CO2 24.8 mmol/L      Calcium 9.2 mg/dL      Total Protein 6.3 g/dL      Albumin 3.8 g/dL      ALT (SGPT) 18 U/L      AST (SGOT) 25 U/L      Alkaline Phosphatase 59 U/L      Total Bilirubin 0.6 mg/dL      Globulin 2.5 gm/dL      A/G Ratio 1.5 g/dL      BUN/Creatinine Ratio 13.2     Anion Gap 10.2 mmol/L      eGFR 24.4 mL/min/1.73     Narrative:      GFR Normal >60  Chronic Kidney Disease <60  Kidney Failure <15    The GFR formula is only valid for adults with stable renal function between ages 18 and 70.    Magnesium [[id number]]  (Normal) Collected: 11/02/23 1550    Specimen: Blood Updated: 11/02/23 1622     Magnesium 2.0 mg/dL     High Sensitivity Troponin T [[id number]]  (Abnormal) Collected: 11/02/23 1550    Specimen: Blood Updated: 11/02/23 1618     HS Troponin T 338 ng/L      Narrative:      High Sensitive Troponin T Reference Range:  <10.0 ng/L- Negative Female for AMI  <15.0 ng/L- Negative Male for AMI  >=10 - Abnormal Female indicating possible myocardial injury.  >=15 - Abnormal Male indicating possible myocardial injury.   Clinicians would have to utilize clinical acumen, EKG, Troponin, and serial changes to determine if it is an Acute Myocardial Infarction or myocardial injury due to an underlying chronic condition.         Lactic Acid, Plasma [[id number]]  (Normal) Collected: 11/02/23 1550    Specimen: Blood Updated: 11/02/23 1616     Lactate 1.6 mmol/L             Imaging Results (Last 24 Hours)       Procedure Component Value Units Date/Time    US Renal Bilateral [[id number]] Collected: 11/03/23 0944     Updated: 11/03/23 0947    Narrative:      US RENAL BILATERAL    Date of Exam: 11/3/2023 8:55 AM EDT    Indication: ARF/PATT/CRF/CKD.    Comparison: No comparisons available.    Technique: Grayscale and color Doppler ultrasound evaluation of the kidneys and urinary bladder was performed.      Findings:  The right kidney measures 9.35 in length by 4.63 transversely and the left measures 9.42 cm in length by 5.08 cm transversely. There is no hydronephrosis and there are no solid masses. There is a small left cortical cyst measuring 9 x 9 x 10 mm.      Impression:      Impression:  No obstruction. Small simple cyst in the left kidney noted.    Electronically Signed: Leyda Joseph MD    11/3/2023 9:45 AM EDT    Workstation ID: [id number]    CT Head Without Contrast [[id number]] Collected: 11/02/23 1652     Updated: 11/02/23 1700    Narrative:      CT HEAD WO CONTRAST, CT CERVICAL SPINE WO CONTRAST    Date of Exam: 11/2/2023 3:28 PM CDT    Indication: Head trauma, minor (Age >= 65y)  Head trauma, moderate-severe.    Comparison: None available.    Technique: Axial CT images were obtained of the head and cervical spine without contrast administration.  Coronal in sagittal reconstructions  were performed.  Automated exposure control and iterative reconstruction methods were used.      Findings:  CT HEAD:  There is no evidence of acute infarction.    There there is no acute intracranial hemorrhage. There are no extra-axial collections.    Ventricles and CSF spaces are symmetrically prominent. No mass effect nor hydrocephalus.    Moderate nonspecific white matter hypoattenuation suggestive of microvascular scheming disease without mass effect. 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     Paranasal sinuses and mastoid air cells are adequately aerated.     Osseous structures and orbits appear intact.        CT CERVICAL SPINE:  OSSEOUS STRUCTURES: No fracture nor bony destructive process.    ALIGNMENT: There is grade 1 spondylolisthesis at C4/5.    DISC SPACE: Moderate lower cervical spondylosis.    JOINTS: Moderate mid cervical facet arthropathy.    SOFT TISSUES:  Unremarkable    LUNG APICES: Unremarkable    LEVELS: There is moderate osseous left neuroforaminal stenosis at C4/5.      Impression:      Impression:  1.No acute traumatic injury within the head nor cervical spine identified.  2.Age-related changes as detailed above.      Electronically Signed: Kash Faria MD    11/2/2023 3:58 PM CDT    Workstation ID: [id number]    CT Cervical Spine Without Contrast [[id number]] Collected: 11/02/23 1652     Updated: 11/02/23 1700    Narrative:      CT HEAD WO CONTRAST, CT CERVICAL SPINE WO CONTRAST    Date of Exam: 11/2/2023 3:28 PM CDT    Indication: Head trauma, minor (Age >= 65y)  Head trauma, moderate-severe.    Comparison: None available.    Technique: Axial CT images were obtained of the head and cervical spine without contrast administration.  Coronal in sagittal reconstructions were performed.  Automated exposure control and iterative reconstruction methods were used.      Findings:  CT HEAD:  There is no evidence of acute infarction.    There there is no acute intracranial  hemorrhage. There are no extra-axial collections.    Ventricles and CSF spaces are symmetrically prominent. No mass effect nor hydrocephalus.    Moderate nonspecific white matter hypoattenuation suggestive of microvascular scheming disease without mass effect. 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     Paranasal sinuses and mastoid air cells are adequately aerated.     Osseous structures and orbits appear intact.        CT CERVICAL SPINE:  OSSEOUS STRUCTURES: No fracture nor bony destructive process.    ALIGNMENT: There is grade 1 spondylolisthesis at C4/5.    DISC SPACE: Moderate lower cervical spondylosis.    JOINTS: Moderate mid cervical facet arthropathy.    SOFT TISSUES:  Unremarkable    LUNG APICES: Unremarkable    LEVELS: There is moderate osseous left neuroforaminal stenosis at C4/5.      Impression:      Impression:  1.No acute traumatic injury within the head nor cervical spine identified.  2.Age-related changes as detailed above.      Electronically Signed: Kash Faria MD    11/2/2023 3:58 PM CDT    Workstation ID: [id number]    XR Chest 2 View [[id number]] Collected: 11/02/23 1654     Updated: 11/02/23 1657    Narrative:      XR CHEST 2 VW    Date of Exam: 11/2/2023 3:29 PM CDT    Indication: fall    Comparison: CT chest 5/27/2021    Findings:  The heart is normal in size. The lungs are hyperinflated. No evidence for pneumothorax or pleural effusion. There is a nodule at the right lung base measuring 7 mm. No acute osseous injury noted.      Impression:      Impression:  7 mm nodule at the right lung base. A follow-up noncontrast CT thorax can be obtained for evaluation.      Electronically Signed: Karyn Faria MD    11/2/2023 3:55 PM CDT    Workstation ID: [id number]    XR Hip With or Without Pelvis 2 - 3 View Left [[id number]] Collected: 11/02/23 1652     Updated: 11/02/23 1657    Narrative:      XR HIP W OR WO PELVIS 2-3 VIEW LEFT    Date of Exam: 11/2/2023 4:29 PM  EDT    Indication: fall    Comparison: None available.    Findings:  No definite acute fracture or dislocation. Please note that evaluation of the left pelvic rim is mildly degraded due to patient positioning.    Extensive degenerative changes of the left hip with deformity of the left femoral head with associated adjacent sclerosis.  Mild degenerative changes of the right hip.    No suspicious soft tissue findings. Mild stool burden in the ascending colon      Impression:      Impression:  No definite acute fracture or dislocation.    Extensive degenerative changes with chronic appearing deformity of the left femoral head. Underlying AVN is not excluded.      Electronically Signed: Johnny Ventura DO    11/2/2023 4:55 PM EDT    Workstation ID: [id number]    XR Knee 3 View Left [[id number]] Collected: 11/02/23 1652     Updated: 11/02/23 1656    Narrative:      XR KNEE 3 VW RIGHT, XR KNEE 3 VW LEFT    Date of Exam: 11/2/2023 3:29 PM CDT    Indication: fall    Comparison: None available.    Findings:  Evaluation of the right knee shows no evidence for acute fracture or acute alignment abnormality. There is advanced lateral compartment joint space narrowing with bone-on-bone changes and osteophyte formation. Patellofemoral joint space narrowing is   noted. There is a suprapatellar joint effusion.    Evaluation of the left knee shows no evidence for acute fracture or acute alignment abnormality. Patellofemoral joint space narrowing is noted. No joint effusion.      Impression:      Impression:  No evidence for acute osseous injury to the right or left knee.    Advanced right knee lateral compartment degenerative arthrosis and joint effusion.      Electronically Signed: Karyn Faria MD    11/2/2023 3:54 PM CDT    Workstation ID: [id number]    XR Knee 3 View Right [[id number]] Collected: 11/02/23 1652     Updated: 11/02/23 1656    Narrative:      XR KNEE 3 VW RIGHT, XR KNEE 3 VW LEFT    Date of Exam: 11/2/2023 3:29 PM  CDT    Indication: fall    Comparison: None available.    Findings:  Evaluation of the right knee shows no evidence for acute fracture or acute alignment abnormality. There is advanced lateral compartment joint space narrowing with bone-on-bone changes and osteophyte formation. Patellofemoral joint space narrowing is   noted. There is a suprapatellar joint effusion.    Evaluation of the left knee shows no evidence for acute fracture or acute alignment abnormality. Patellofemoral joint space narrowing is noted. No joint effusion.      Impression:      Impression:  No evidence for acute osseous injury to the right or left knee.    Advanced right knee lateral compartment degenerative arthrosis and joint effusion.      Electronically Signed: Karyn Faria MD    11/2/2023 3:54 PM CDT    Workstation ID: [id number]    XR Elbow 3+ View Left [[id number]] Collected: 11/02/23 1651     Updated: 11/02/23 1654    Narrative:      XR ELBOW 3+ VW LEFT    Date of Exam: 11/2/2023 4:29 PM EDT    Indication: fall    Comparison: None available.    Findings:  There is no evidence of fracture or dislocation.  No focal lesions identified. No erosions.    Mild degenerative changes.    No focal soft tissue abnormalities identified.      Impression:      Impression:  No acute abnormality.      Electronically Signed: Johnny Ventura DO    11/2/2023 4:52 PM EDT    Workstation ID: [id number]            LAB RESULTS (LAST 7 DAYS)    CBC  Results from last 7 days   Lab Units 11/03/23  0626 11/02/23  1550   WBC 10*3/mm3 11.40* 12.47*   RBC 10*6/mm3 3.85 4.33   HEMOGLOBIN g/dL 11.9* 13.1   HEMATOCRIT % 35.3 40.5   MCV fL 91.6 93.5   PLATELETS 10*3/mm3 460* 529*       BMP  Results from last 7 days   Lab Units 11/03/23  0626 11/02/23  1550   SODIUM mmol/L 141 134*   POTASSIUM mmol/L 3.8 3.8   CHLORIDE mmol/L 109* 99   CO2 mmol/L 22.0 24.8   BUN mg/dL 24* 26*   CREATININE mg/dL 0.91 1.97*   GLUCOSE mg/dL 90 142*   MAGNESIUM mg/dL 1.9 2.0   PHOSPHORUS  mg/dL 3.5  --        CMP   Results from last 7 days   Lab Units 11/03/23  0626 11/02/23  1550   SODIUM mmol/L 141 134*   POTASSIUM mmol/L 3.8 3.8   CHLORIDE mmol/L 109* 99   CO2 mmol/L 22.0 24.8   BUN mg/dL 24* 26*   CREATININE mg/dL 0.91 1.97*   GLUCOSE mg/dL 90 142*   ALBUMIN g/dL 2.9* 3.8   BILIRUBIN mg/dL 0.4 0.6   ALK PHOS U/L 74 59   AST (SGOT) U/L 19 25   ALT (SGPT) U/L 14 18       BNP        TROPONIN  Results from last 7 days   Lab Units 11/03/23  0626   CK TOTAL U/L 261*   HSTROP T ng/L 160*       CoAg        Creatinine Clearance  Estimated Creatinine Clearance: 42.7 mL/min (by C-G formula based on SCr of 0.91 mg/dL).    ABG          Radiology  US Renal Bilateral    Result Date: 11/3/2023  Impression: No obstruction. Small simple cyst in the left kidney noted. Electronically Signed: Leyda Joseph MD  11/3/2023 9:45 AM EDT  Workstation ID: [id number]    CT Head Without Contrast    Result Date: 11/2/2023  Impression: 1.No acute traumatic injury within the head nor cervical spine identified. 2.Age-related changes as detailed above. Electronically Signed: Kash Faria MD  11/2/2023 3:58 PM CDT  Workstation ID: [id number]    CT Cervical Spine Without Contrast    Result Date: 11/2/2023  Impression: 1.No acute traumatic injury within the head nor cervical spine identified. 2.Age-related changes as detailed above. Electronically Signed: Kash Faria MD  11/2/2023 3:58 PM CDT  Workstation ID: [id number]    XR Chest 2 View    Result Date: 11/2/2023  Impression: 7 mm nodule at the right lung base. A follow-up noncontrast CT thorax can be obtained for evaluation. Electronically Signed: Karyn Faria MD  11/2/2023 3:55 PM CDT  Workstation ID: [id number]    XR Hip With or Without Pelvis 2 - 3 View Left    Result Date: 11/2/2023  Impression: No definite acute fracture or dislocation. Extensive degenerative changes with chronic appearing deformity of the left femoral head. Underlying AVN is not excluded. Electronically  Signed: Johnny Casastiffanieleah, DO  11/2/2023 4:55 PM EDT  Workstation ID: [id number]    XR Knee 3 View Left    Result Date: 11/2/2023  Impression: No evidence for acute osseous injury to the right or left knee. Advanced right knee lateral compartment degenerative arthrosis and joint effusion. Electronically Signed: Karyn Faria MD  11/2/2023 3:54 PM CDT  Workstation ID: [id number]    XR Knee 3 View Right    Result Date: 11/2/2023  Impression: No evidence for acute osseous injury to the right or left knee. Advanced right knee lateral compartment degenerative arthrosis and joint effusion. Electronically Signed: Karyn Faria MD  11/2/2023 3:54 PM CDT  Workstation ID: [id number]    XR Elbow 3+ View Left    Result Date: 11/2/2023  Impression: No acute abnormality. Electronically Signed: Johnny Lorenzo,   11/2/2023 4:52 PM EDT  Workstation ID: [id number]       EKG  I personally viewed and interpreted the patient's EKG/Telemetry data:  ECG 12 Lead Syncope   Final Result   HEART RATE= 90  bpm   RR Interval= 664  ms   ME Interval= 134  ms   P Horizontal Axis=   deg   P Front Axis= 70  deg   QRSD Interval= 82  ms   QT Interval= 393  ms   QTcB= 482  ms   QRS Axis= 14  deg   T Wave Axis= 33  deg   - NORMAL ECG -   Sinus rhythm   When compared with ECG of 29-Aug-2018 12:37:46,   No significant change   Electronically Signed By: Raisa Gross (BUCK) 02-Nov-2023 16:22:14   Date and Time of Study: 2023-11-02 15:44:10      SCANNED - TELEMETRY     Final Result      SCANNED - TELEMETRY     Final Result      SCANNED - TELEMETRY     Final Result      SCANNED EKG   Final Result      SCANNED - TELEMETRY     Final Result      SCANNED - TELEMETRY     Final Result      ECG 12 Lead Other; TROPONIS ELEVATED    (Results Pending)         Echocardiogram:    Results for orders placed during the hospital encounter of 11/02/23    Adult Transthoracic Echo Complete w/ Color, Spectral and Contrast if necessary per protocol    Interpretation Summary     Left ventricular systolic function is normal. Calculated left ventricular EF = 64% Left ventricular ejection fraction appears to be 61 - 65%.    Left ventricular diastolic function is consistent with (grade I) impaired relaxation.    Estimated right ventricular systolic pressure from tricuspid regurgitation is normal (<35 mmHg).    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        Stress Test:        Cardiac Catheterization:  No results found for this or any previous visit.        Other:      ASSESSMENT & PLAN:    Principal Problem:    PATT (acute kidney injury)  Active Problems:    Rhabdomyolysis    Elevated troponins  Myocardial injury pattern given there was no rise in follow-up troponins and troponins have only continue to trend down.  EKG does not show any ischemic changes.  Patient is chest pain-free.  Echocardiogram does not show any regional wall motion abnormalities  Discussed with patient's primary physician Dr. Martinez and favor conservative management at this time  Continue aspirin and metoprolol and Imdur    History of CAD  Continue aspirin and beta-blocker    Hyperlipidemia  Not on statin given advanced age and dementia    Hypertension  Well-controlled on metoprolol and Imdur    Thank you for this consult cardiology will sign off      Jennifer Cox MD  11/03/23  14:12 EDT

## 2023-11-03 NOTE — NURSING NOTE
Patient admitted from [facility] to Hospitalist service. On arrival, family states that Dr. Martinez is patients PCP. Call placed to Dr Wise answering service to verify. Did not receive return call. Dr Mcfarlane from hospitalist service saw patient and put in orders.

## 2023-11-03 NOTE — PLAN OF CARE
Goal Outcome Evaluation:  Plan of Care Reviewed With: patient, son        Progress: no change  Outcome Evaluation: Rowan Guzman is an 87 y/o F admitted to [facility] on 11/2/23 after a fall with AMS. Imaging of head and BLE (-) for acute abnormalities. PMH includes CAD, DM, HTN, HLD, and dementia. Patient is AAOx3 this date, disoriented to situation. Patient's son present this date to assist with PLOF and family's concerns for patient's safety. At baseline, pt lives alone in [facility] and is indep with ADLs, mobility, with RW. Pt reports significant hx of falls. Pt's son reports family members take turns checking up on patient, and try to daily. Currently, pt requires modA for mobility, RW for balance, and cues for set-up and sequencing. Pt is below baseline function and would benefit from SNF upon d/c d/t worsening cognition and balance. PT to follow.      Anticipated Discharge Disposition (PT): skilled nursing facility

## 2023-11-03 NOTE — DISCHARGE PLACEMENT REQUEST
"Roawn Guzman (88 y.o. Female)       Date of Birth   1935    Social Security Number       Address   [facility] [place] [address]    Home Phone   [phone]    MRN   [id number]       [place]   [employer]    Marital Status   Single                            Admission Date   11/2/23    Admission Type   Urgent    Admitting Provider   Willa Martinez MD    Attending Provider   Willa Martinez MD    Department, Room/Bed   [facility] 2D, 265/1       Discharge Date       Discharge Disposition       Discharge Destination                                 Attending Provider: Willa Martinez MD    Allergies: Penicillins, Sulfa Antibiotics    Isolation: None   Infection: None   Code Status: CPR    Ht: 162.6 cm (64\")   Wt: 70.3 kg (155 lb)    Admission Cmt: None   Principal Problem: PATT (acute kidney injury) [N17.9]                   Active Insurance as of 11/2/2023       Primary Coverage       Payor Plan Insurance Group Employer/Plan Group    MEDICARE MEDICARE A & B        Payor Plan Address Payor Plan Phone Number Payor Plan Fax Number Effective Dates    [address] [phone]  5/1/2002 - None Entered    [facility] [id number]         Subscriber Name Subscriber Birth Date Member ID       ROWAN GUZMAN 1935 [id number]               Secondary Coverage       Payor Plan Insurance Group Employer/Plan Group    AARP MC SUP AAR HEALTH CARE OPTIONS        Payor Plan Address Payor Plan Phone Number Payor Plan Fax Number Effective Dates    [facility] [phone]  1/1/2019 - None Entered    [address]       [facility] [address]         Subscriber Name Subscriber Birth Date Member ID       ROWAN GUZMAN 1935 [id number]                     Emergency Contacts        (Rel.) Home Phone Work Phone Mobile Phone    ALANNAH CONN (Daughter) -- -- [phone]    RENETTA GUZMAN (Son) -- -- [phone]                 History & Physical        Willa Martinez MD at 11/03/23 0144        "       [facility] Medicine Services  History & Physical    Patient Name: Rowan Guzman  : 1935  MRN: [id number]  Primary Care Physician:  Willa Arreola MD  Date of admission: 2023  Date and Time of Service: 23      Subjective      Chief Complaint: Confusion    ADDENDUM: THIS PATIENT HAS BEEN A PATIENT OF MINE FOR OVER 30 YEARS. SHE HAS HAD PROGRESSIVE DEMENTIA AND I HAVE HAD SAFETY  CONCERNS WHICH I HAVE EXPRESSED TO HER DAUGHTER  (ALANNAH) AND SON REPEATEDLY OVER THE PAST 3 YEARS. SHE MARGINALLY LIVES ALONE AT HOME AND I DO NOT FEEL THAT IT IS SAFE. SHE HAD AT LEAST 2 FALLS AT HOME, AND ON THE SECOND ONE, ALANNAH CALLED ME FOR ADVICE. I INSTRUCTED HER TO BE TAKEN TO THE ER/UCC FOR BHF. SHE WAS FOUND TO HAVE MILD RHABDO AND PATT, SHE HAS A LONGSTANDING HISTORY OF DM II, ASTHMA. DYSLIPIDEMIA (BUT HAS BEEN OFF OF MEDS DUE TO HER DEMENTIA AND GENERAL DECLINE. SHE HAS NOT REQUIRED DM II MEDS EITHER DUE TO DECREASED PO INTAKE IN RECENT YEARS. FAMILY CHECKS ON HER REGULARLY. SHE HAS BEEN VERY REFRACTORY TO ANY OTHER LIVING ARRANGEMENTS UNFORTUNATELY. WILL INVOLVE CASE MANAGEMENT FOR ALTERNATIVES. SHE WOULD DO WELL IN AN ASSISTED LIVING ENVIRONMENT. WILL ADDRESS CODE STATUS WITH FAMIL LATER THIS AM. WILL INVOLVE NEPHROLOGY AND CARDIOLOGY. VERY CONSERVATIVE CARE WILL BE ADVISED . THE ER/UCC UNFORTUNATELY CALLED THE HOSPITALIST AND NOT ME FOR REPORT AND ADMISSION. THE HOSPITALIST UNFORTUNATELY , EVEN WITH MY NAME AS PCP, STARTED THE H AND P AND ADMISSION/MANAGEMENT PROCESS. RN CALLED ME AND OF COURSE I WILL MANAGE MY PATIENT'S CARE. ALL PARTIES AGAIN REMINDED THAT I MANAGE AND ADMIT MY OWN PATIENTS . I APPRECIATE THE RN CALLING ME THIS AM. ORDERS ADDED/CHANGED.  THE DEMENTIA IS NOT NEW AND BY REPORT, SHE SEEMS TO BE AT HER BASELINE COGNITIVE FUNCTIONING LEVEL---O X 1-2 AT BEST    WILLA ARREOLA MD    History of Present Illness: Rowan Guzman is a 88 y.o. female who presented to [facility] on  11/2/2023 complaining of confusion.  Patient is a transfer from [facility].  Apparently, patient was found down by family at home.  Patient lives alone and able to take care of herself.  Per report patient's son found her on the ground after attempts to reach her were unsuccessful.  Work-up at [facility] showed elevated troponin 338, CK level 422, creatinine 1.97, WBC 12.4.  Head CT showed no acute intracranial abnormality.  Imaging of her hip and knees also negative for fracture.  Patient evaluated upon arrival here.  She is alert and oriented x2.  She is not able to state events leading up to her being found on the floor.  Denies any symptoms except bilateral leg pain.      ROS   As stated above.    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 Otherwise pertinent FHx was reviewed and not pertinent to current issue.    Social History:  reports that she quit smoking about 34 years ago. She has never used smokeless tobacco. She reports that she does not drink alcohol and does not use drugs.    Home Medications:  Prior to Admission Medications       Prescriptions Last Dose Informant Patient Reported? Taking?    isosorbide mononitrate (IMDUR) 30 MG 24 hr tablet   No No    TAKE 1 TABLET BY MOUTH EVERY DAY    alendronate  (FOSAMAX) 70 MG tablet   Yes No    aspirin (aspirin) 81 MG EC tablet   Yes No    Daily.    atorvastatin (LIPITOR) 40 MG tablet   Yes No    budesonide (PULMICORT) 0.5 MG/2ML nebulizer solution   Yes No    PULMICORT 0.5 MG/2ML SUSP    cetirizine (zyrTEC) 10 MG tablet   Yes No    ZYRTEC ALLERGY 10 MG TABS    FA-Pyridoxine-Cyanocobalamin (FOLBIC PO)   Yes No    FOLBIC TABS    fluconazole (DIFLUCAN) 200 MG tablet   No No    Take 1 tablet by mouth Daily.    gabapentin (NEURONTIN) 300 MG capsule   Yes No    ipratropium-albuterol (DUO-NEB) 0.5-2.5 mg/3 ml nebulizer   Yes No    DUONEB 0.5-2.5 (3) MG/3ML INHALATION SOLUTION    metoprolol succinate XL (TOPROL-XL) 25 MG 24 hr tablet   No No    TAKE 1 TABLET BY MOUTH EVERY DAY    montelukast (SINGULAIR) 10 MG tablet   Yes No    Multiple Vitamin (MULTI-DAY VITAMINS) tablet   Yes No    MULTI-DAY VITAMINS TABS    PREMARIN 0.625 MG/GM vaginal cream   Yes No    PROAIR  (90 Base) MCG/ACT inhaler   Yes No    vitamin C (ASCORBIC ACID) 500 MG tablet   Yes No    VITAMIN C 500 MG TABS              Allergies:  Allergies   Allergen Reactions    Penicillins Rash    Sulfa Antibiotics Diarrhea       Objective      Vitals:   Temp:  [97.3 °F (36.3 °C)-97.5 °F (36.4 °C)] 97.3 °F (36.3 °C)  Heart Rate:  [72-99] 72  Resp:  [12-21] 21  BP: ()/(43-70) 129/63    Physical Exam     ALERT AND EATING BREAKFAST. IT TOOK HER A BIT BUT SHE FINALLY CAME UP WITH MY NAME. THOUGHT SHE HAD BEEN IN AN ACCIDENT AND DID NOT RECALL WHY SHE IS HERE IN THE HOSPITAL. PLEASANT, TALKATIVE. O X 1    1 + LOWER EXTREMITY EDEMA    DISCUSSED CODE STATUS IN DETAIL WITH SON WHO WILL REACH OUT FOR A FINAL DECISION WITH SISTER AND BROTHER    DISCUSSED THAT SHE CANNOT SAFELY GO HOME ANY FURTHER    NEEDS SKILLED THEN ASSISTED LIVING ARRANGEMENTS    WILL AWAIT C AND S OF URINE, WILL ADD ANTIBIOTIC IV FOR NOW, AND WILL ADMIT FULLY  AWAITING REHAB FACILITY APPROVAL FOR A BED    JOANA ARREOLA MD  11/3/23  1PM    General  Appearance: AOO x 2, cooperative, no distress, appropriate for age  (PER HOSPITALIST  AND DONE IN ERROR)    Head:  Normocephalic, without obvious abnormality  Eyes:  PERRL, EOM's intact, conjunctivae and cornea clear  Nose:  Nares symmetrical, septum midline, mucosa pink  Throat:  Lips, tongue, and mucosa are moist, pink, and intact  Neck:  Supple; symmetrical, trachea midline, no adenopathy  Back:  Symmetrical, ROM normal, no CVA tenderness  Lungs: Respirations unlabored, no audible wheeze  Heart: Regular rate & rhythm, S1 and S2 normal  Abdomen:  Soft, nontender, bowel sounds active all four quadrants  Musculoskeletal: Bilateral leg swelling  Skin/Hair/Nails:  Skin warm, dry and intact, no rashes or abnormal dyspigmentation       Result Review   Result Review:  I have personally reviewed the results from the time of this admission to 11/3/2023 01:45 EDT and agree with these findings:  [x]  Laboratory  []  Microbiology  [x]  Radiology  []  EKG/Telemetry   []  Cardiology/Vascular   []  Pathology  []  Old records  []  Other:  Most notable findings include:       Assessment & Plan        Active Hospital Problems:  Active Hospital Problems    Diagnosis     **PATT (acute kidney injury)      Plan:   Fall  Altered mental status  Obtain MRI of the head  PT OT consult    Acute kidney injury  Creatinine 1.97  Start normal saline 125 cc an hour  Repeat BMP in the morning    Rhabdomyolysis  Continue with IV fluids  Repeat CK level in the morning    Coronary artery disease  Elevated troponin  Trend troponin  EKG shows normal sinus rhythm  Patient denies chest pain  Consult cardiology    Diabetes  We will add sliding scale insulin and Accu-Cheks    Hypertension  Blood pressure stable  Resume home meds    Hyperlipidemia  Resume statin    DVT prophylaxis:  Medical DVT prophylaxis orders are present.    CODE STATUS:  WILL ADDRESS WITH FAMILY THIS AM     Admission Status:  I believe this patient meets Obs status.    I discussed the  patient's findings and my recommendations with patient and family.     11/03/23      Signature: Electronically signed by Manju Mcfarlane MD, 11/03/23, 01:45 EDT.  [facility] Hospitalist Team    SIGNED BY JOANA ARREOLA MD PCP      Electronically signed by Joana Arreola MD at 11/03/23 1301       Physician Progress Notes (last 24 hours)  Notes from 11/02/23 1310 through 11/03/23 1310   No notes of this type exist for this encounter.          Consult Notes (last 24 hours)        Teddy Francis MD at 11/03/23 0808        Consult Orders    1. Inpatient Nephrology Consult [[id number]] ordered by Joana Arreola MD at 11/03/23 0530                     NEPHROLOGY CONSULTATION-----[facility]    [facility]  [phone]  Clark Francis MD    Patient Care Team:  Joana Arreola MD as PCP - General  Joana Arreola MD as PCP - Family Medicine  [facility] as Consulting Physician (Cardiology)  Teddy Francis MD as Consulting Physician (Nephrology)    CC/REASON FOR CONSULTATION: RENAL FAILURE/ELEVATED SERUM CREATININE    PHYSICIAN REQUESTING CONSULTATION:     History of Present Illness    HPI    Patient is a 88 y.o. WF whom I was asked to see in consultation for evaluation and management of renal failure/elevated serum creatinine. Patient was admitted after presenting to ER with c/o MS changes and s/p fall. Patient denies prior knowledge of functional kidney disease, proteinuria, or hematuria. No NSAIDs or recent IV dye exposure. No known h/o hepatitis, TB, rheumatic fever, jaundice, SLE, bleeding/bruising disorders.  No urinary sx. No edema or fluid retention.  +Compliance with home meds. Was not on diuretics prior to admission. Was not on ACE-I/ARB prior to admission. No herbal med use. +Hypotension on admission    Review of Systems   Constitutional:  Positive for activity change, appetite change and fatigue.  Negative for chills, diaphoresis, fever and unexpected weight change.   HENT:  Negative for congestion, dental problem, drooling, ear discharge, ear pain, facial swelling, hearing loss, mouth sores, nosebleeds, postnasal drip, rhinorrhea, sinus pressure, sinus pain, sneezing, sore throat, tinnitus, trouble swallowing and voice change.    Eyes:  Negative for photophobia, pain, discharge, redness, itching and visual disturbance.   Respiratory:  Negative for apnea, cough, choking, chest tightness, shortness of breath, wheezing and stridor.    Cardiovascular:  Negative for chest pain, palpitations and leg swelling.   Gastrointestinal:  Negative for abdominal distention, abdominal pain, anal bleeding, blood in stool, constipation, diarrhea, nausea, rectal pain and vomiting.   Endocrine: Negative for cold intolerance, heat intolerance, polydipsia, polyphagia and polyuria.   Genitourinary:  Positive for difficulty urinating. Negative for decreased urine volume, dysuria, enuresis, flank pain, frequency, genital sores, hematuria and urgency.   Musculoskeletal:  Positive for arthralgias and back pain. Negative for gait problem, joint swelling, myalgias, neck pain and neck stiffness.   Skin:  Negative for color change, pallor, rash and wound.   Allergic/Immunologic: Negative for environmental allergies, food allergies and immunocompromised state.   Neurological:  Positive for weakness. Negative for dizziness, tremors, seizures, syncope, facial asymmetry, speech difficulty, light-headedness, numbness and headaches.   Hematological:  Negative for adenopathy. Does not bruise/bleed easily.   Psychiatric/Behavioral:  Negative for agitation, behavioral problems, confusion, decreased concentration, dysphoric mood, hallucinations, self-injury, sleep disturbance and suicidal ideas. The patient is not nervous/anxious and is not hyperactive.           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       Family History   Problem Relation Age of Onset    Hyperlipidemia Mother     Diabetes Mother     Heart attack Mother     Heart disease Father     Hypertension Father        Social History     Tobacco Use    Smoking status: Former     Types: Cigarettes     Quit date: 1989     Years since quittin.1    Smokeless tobacco: Never   Vaping Use    Vaping Use: Never used   Substance Use Topics    Alcohol use: No    Drug use: No       Home Meds:   Medications Prior to Admission   Medication Sig Dispense Refill Last Dose    isosorbide mononitrate (IMDUR) 30 MG 24 hr tablet TAKE 1 TABLET BY MOUTH EVERY DAY 90 tablet 3     aspirin (aspirin) 81 MG EC tablet Daily.       budesonide (PULMICORT) 0.5 MG/2ML nebulizer solution PULMICORT 0.5 MG/2ML SUSP       cetirizine (zyrTEC) 10 MG tablet ZYRTEC ALLERGY 10 MG TABS       gabapentin (NEURONTIN) 300 MG capsule        ipratropium-albuterol (DUO-NEB) 0.5-2.5 mg/3 ml nebulizer DUONEB 0.5-2.5 (3) MG/3ML INHALATION SOLUTION       montelukast (SINGULAIR) 10 MG tablet           Scheduled Meds:  aspirin, 81 mg, Oral, Daily  budesonide, 0.5 mg, Nebulization, BID - RT  cetirizine, 10 mg, Oral, Daily  enoxaparin, 30 mg, Subcutaneous, Daily  gabapentin, 300 mg, Oral, Nightly  insulin lispro, 2-7 Units, Subcutaneous, 4x Daily AC & at Bedtime  ipratropium-albuterol, 3 mL, Nebulization, TID - RT  isosorbide mononitrate, 30 mg, Oral, Daily  montelukast, 10 mg, Oral, Nightly  sodium chloride, 10 mL, Intravenous, Q12H  sodium chloride, 10 mL,  Intravenous, Q12H        Continuous Infusions:  Pharmacy to Dose enoxaparin (LOVENOX),   sodium chloride, 75 mL/hr, Last Rate: 75 mL/hr (11/03/23 0536)        PRN Meds:    acetaminophen    aluminum-magnesium hydroxide-simethicone    calcium carbonate    dextrose    dextrose    glucagon (human recombinant)    nitroglycerin    ondansetron **OR** ondansetron    Pharmacy to Dose enoxaparin (LOVENOX)    [COMPLETED] Insert peripheral IV **AND** sodium chloride    sodium chloride    sodium chloride    sodium chloride    sodium chloride    Allergies:  Penicillins and Sulfa antibiotics    OBJECTIVE    Vital Signs  Temp:  [97.3 °F (36.3 °C)-98.1 °F (36.7 °C)] 98.1 °F (36.7 °C)  Heart Rate:  [72-99] 85  Resp:  [12-22] 22  BP: ()/(43-70) 147/66    No intake/output data recorded.  I/O last 3 completed shifts:  In: 2000 [I.V.:1000; IV Piggyback:1000]  Out: 200 [Urine:200]    Physical Exam:  General Appearance: alert, appears stated age and cooperative  Head: normocephalic, without obvious abnormality and atraumatic +DRY OP  Eyes: conjunctivae and sclerae normal and no icterus  Neck: supple and no JVD  Lungs: clear to auscultation and respirations regular  Heart: regular rhythm & normal rate and normal S1, S2  Chest Wall: no abnormalities observed  Abdomen: normal bowel sounds and soft, nontender  Extremities: moves extremities well, no edema, no cyanosis +DJD  Skin: no bleeding, bruising or rash +DECREASED SKIN TURGOR  Neurologic: Alert, and oriented. No focal deficits    Results Review:    I reviewed the patient's new clinical results.    WBC WBC   Date Value Ref Range Status   11/03/2023 11.40 (H) 3.40 - 10.80 10*3/mm3 Final   11/02/2023 12.47 (H) 3.40 - 10.80 10*3/mm3 Final      HGB Hemoglobin   Date Value Ref Range Status   11/03/2023 11.9 (L) 12.0 - 15.9 g/dL Final   11/02/2023 13.1 12.0 - 15.9 g/dL Final      HCT Hematocrit   Date Value Ref Range Status   11/03/2023 35.3 34.0 - 46.6 % Final   11/02/2023 40.5 34.0 -  "46.6 % Final      Platelets No results found for: \"LABPLAT\"   MCV MCV   Date Value Ref Range Status   11/03/2023 91.6 79.0 - 97.0 fL Final   11/02/2023 93.5 79.0 - 97.0 fL Final          Sodium Sodium   Date Value Ref Range Status   11/03/2023 141 136 - 145 mmol/L Final   11/02/2023 134 (L) 136 - 145 mmol/L Final      Potassium Potassium   Date Value Ref Range Status   11/03/2023 3.8 3.5 - 5.2 mmol/L Final     Comment:     Slight hemolysis detected by analyzer. Results may be affected.   11/02/2023 3.8 3.5 - 5.2 mmol/L Final      Chloride Chloride   Date Value Ref Range Status   11/03/2023 109 (H) 98 - 107 mmol/L Final   11/02/2023 99 98 - 107 mmol/L Final      CO2 CO2   Date Value Ref Range Status   11/03/2023 22.0 22.0 - 29.0 mmol/L Final   11/02/2023 24.8 22.0 - 29.0 mmol/L Final      BUN BUN   Date Value Ref Range Status   11/03/2023 24 (H) 8 - 23 mg/dL Final   11/02/2023 26 (H) 8 - 23 mg/dL Final      Creatinine Creatinine   Date Value Ref Range Status   11/03/2023 0.91 0.57 - 1.00 mg/dL Final   11/02/2023 1.97 (H) 0.57 - 1.00 mg/dL Final      Calcium Calcium   Date Value Ref Range Status   11/03/2023 8.0 (L) 8.6 - 10.5 mg/dL Final   11/02/2023 9.2 8.6 - 10.5 mg/dL Final      PO4 No results found for: \"CAPO4\"   Albumin Albumin   Date Value Ref Range Status   11/03/2023 2.9 (L) 3.5 - 5.2 g/dL Final   11/02/2023 3.8 3.5 - 5.2 g/dL Final      Magnesium Magnesium   Date Value Ref Range Status   11/03/2023 1.9 1.6 - 2.4 mg/dL Final   11/02/2023 2.0 1.6 - 2.4 mg/dL Final      Uric Acid No results found for: \"URICACID\"       Imaging Results (Last 72 Hours)       Procedure Component Value Units Date/Time    CT Head Without Contrast [[id number]] Collected: 11/02/23 1652     Updated: 11/02/23 1700    Narrative:      CT HEAD WO CONTRAST, CT CERVICAL SPINE WO CONTRAST    Date of Exam: 11/2/2023 3:28 PM CDT    Indication: Head trauma, minor (Age >= 65y)  Head trauma, moderate-severe.    Comparison: None " available.    Technique: Axial CT images were obtained of the head and cervical spine without contrast administration.  Coronal in sagittal reconstructions were performed.  Automated exposure control and iterative reconstruction methods were used.      Findings:  CT HEAD:  There is no evidence of acute infarction.    There there is no acute intracranial hemorrhage. There are no extra-axial collections.    Ventricles and CSF spaces are symmetrically prominent. No mass effect nor hydrocephalus.    Moderate nonspecific white matter hypoattenuation suggestive of microvascular scheming disease without mass effect. 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     Paranasal sinuses and mastoid air cells are adequately aerated.     Osseous structures and orbits appear intact.        CT CERVICAL SPINE:  OSSEOUS STRUCTURES: No fracture nor bony destructive process.    ALIGNMENT: There is grade 1 spondylolisthesis at C4/5.    DISC SPACE: Moderate lower cervical spondylosis.    JOINTS: Moderate mid cervical facet arthropathy.    SOFT TISSUES:  Unremarkable    LUNG APICES: Unremarkable    LEVELS: There is moderate osseous left neuroforaminal stenosis at C4/5.      Impression:      Impression:  1.No acute traumatic injury within the head nor cervical spine identified.  2.Age-related changes as detailed above.      Electronically Signed: Kash Faria MD    11/2/2023 3:58 PM CDT    Workstation ID: [id number]    CT Cervical Spine Without Contrast [[id number]] Collected: 11/02/23 1652     Updated: 11/02/23 1700    Narrative:      CT HEAD WO CONTRAST, CT CERVICAL SPINE WO CONTRAST    Date of Exam: 11/2/2023 3:28 PM CDT    Indication: Head trauma, minor (Age >= 65y)  Head trauma, moderate-severe.    Comparison: None available.    Technique: Axial CT images were obtained of the head and cervical spine without contrast administration.  Coronal in sagittal reconstructions were performed.  Automated exposure control  and iterative reconstruction methods were used.      Findings:  CT HEAD:  There is no evidence of acute infarction.    There there is no acute intracranial hemorrhage. There are no extra-axial collections.    Ventricles and CSF spaces are symmetrically prominent. No mass effect nor hydrocephalus.    Moderate nonspecific white matter hypoattenuation suggestive of microvascular scheming disease without mass effect. 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     Paranasal sinuses and mastoid air cells are adequately aerated.     Osseous structures and orbits appear intact.        CT CERVICAL SPINE:  OSSEOUS STRUCTURES: No fracture nor bony destructive process.    ALIGNMENT: There is grade 1 spondylolisthesis at C4/5.    DISC SPACE: Moderate lower cervical spondylosis.    JOINTS: Moderate mid cervical facet arthropathy.    SOFT TISSUES:  Unremarkable    LUNG APICES: Unremarkable    LEVELS: There is moderate osseous left neuroforaminal stenosis at C4/5.      Impression:      Impression:  1.No acute traumatic injury within the head nor cervical spine identified.  2.Age-related changes as detailed above.      Electronically Signed: Kash Faria MD    11/2/2023 3:58 PM CDT    Workstation ID: [id number]    XR Chest 2 View [[id number]] Collected: 11/02/23 1654     Updated: 11/02/23 1657    Narrative:      XR CHEST 2 VW    Date of Exam: 11/2/2023 3:29 PM CDT    Indication: fall    Comparison: CT chest 5/27/2021    Findings:  The heart is normal in size. The lungs are hyperinflated. No evidence for pneumothorax or pleural effusion. There is a nodule at the right lung base measuring 7 mm. No acute osseous injury noted.      Impression:      Impression:  7 mm nodule at the right lung base. A follow-up noncontrast CT thorax can be obtained for evaluation.      Electronically Signed: Karyn Faria MD    11/2/2023 3:55 PM CDT    Workstation ID: [id number]    XR Hip With or Without Pelvis 2 - 3 View Left  [[id number]] Collected: 11/02/23 1652     Updated: 11/02/23 1657    Narrative:      XR HIP W OR WO PELVIS 2-3 VIEW LEFT    Date of Exam: 11/2/2023 4:29 PM EDT    Indication: fall    Comparison: None available.    Findings:  No definite acute fracture or dislocation. Please note that evaluation of the left pelvic rim is mildly degraded due to patient positioning.    Extensive degenerative changes of the left hip with deformity of the left femoral head with associated adjacent sclerosis.  Mild degenerative changes of the right hip.    No suspicious soft tissue findings. Mild stool burden in the ascending colon      Impression:      Impression:  No definite acute fracture or dislocation.    Extensive degenerative changes with chronic appearing deformity of the left femoral head. Underlying AVN is not excluded.      Electronically Signed: Johnny Ventura DO    11/2/2023 4:55 PM EDT    Workstation ID: [id number]    XR Knee 3 View Left [[id number]] Collected: 11/02/23 1652     Updated: 11/02/23 1656    Narrative:      XR KNEE 3 VW RIGHT, XR KNEE 3 VW LEFT    Date of Exam: 11/2/2023 3:29 PM CDT    Indication: fall    Comparison: None available.    Findings:  Evaluation of the right knee shows no evidence for acute fracture or acute alignment abnormality. There is advanced lateral compartment joint space narrowing with bone-on-bone changes and osteophyte formation. Patellofemoral joint space narrowing is   noted. There is a suprapatellar joint effusion.    Evaluation of the left knee shows no evidence for acute fracture or acute alignment abnormality. Patellofemoral joint space narrowing is noted. No joint effusion.      Impression:      Impression:  No evidence for acute osseous injury to the right or left knee.    Advanced right knee lateral compartment degenerative arthrosis and joint effusion.      Electronically Signed: Karyn Faria MD    11/2/2023 3:54 PM CDT    Workstation ID: [id number]    XR Knee 3 View Right  [[id number]] Collected: 11/02/23 1652     Updated: 11/02/23 1656    Narrative:      XR KNEE 3 VW RIGHT, XR KNEE 3 VW LEFT    Date of Exam: 11/2/2023 3:29 PM CDT    Indication: fall    Comparison: None available.    Findings:  Evaluation of the right knee shows no evidence for acute fracture or acute alignment abnormality. There is advanced lateral compartment joint space narrowing with bone-on-bone changes and osteophyte formation. Patellofemoral joint space narrowing is   noted. There is a suprapatellar joint effusion.    Evaluation of the left knee shows no evidence for acute fracture or acute alignment abnormality. Patellofemoral joint space narrowing is noted. No joint effusion.      Impression:      Impression:  No evidence for acute osseous injury to the right or left knee.    Advanced right knee lateral compartment degenerative arthrosis and joint effusion.      Electronically Signed: Karyn Faria MD    11/2/2023 3:54 PM CDT    Workstation ID: [id number]    XR Elbow 3+ View Left [[id number]] Collected: 11/02/23 1651     Updated: 11/02/23 1654    Narrative:      XR ELBOW 3+ VW LEFT    Date of Exam: 11/2/2023 4:29 PM EDT    Indication: fall    Comparison: None available.    Findings:  There is no evidence of fracture or dislocation.  No focal lesions identified. No erosions.    Mild degenerative changes.    No focal soft tissue abnormalities identified.      Impression:      Impression:  No acute abnormality.      Electronically Signed: Johnny Ventura DO    11/2/2023 4:52 PM EDT    Workstation ID: [id number]              Results for orders placed during the hospital encounter of 11/02/23    XR Knee 3 View Right    Narrative  XR KNEE 3 VW RIGHT, XR KNEE 3 VW LEFT    Date of Exam: 11/2/2023 3:29 PM CDT    Indication: fall    Comparison: None available.    Findings:  Evaluation of the right knee shows no evidence for acute fracture or acute alignment abnormality. There is advanced lateral compartment joint space  narrowing with bone-on-bone changes and osteophyte formation. Patellofemoral joint space narrowing is  noted. There is a suprapatellar joint effusion.    Evaluation of the left knee shows no evidence for acute fracture or acute alignment abnormality. Patellofemoral joint space narrowing is noted. No joint effusion.    Impression  Impression:  No evidence for acute osseous injury to the right or left knee.    Advanced right knee lateral compartment degenerative arthrosis and joint effusion.      Electronically Signed: Karyn Faria MD  11/2/2023 3:54 PM CDT  Workstation ID: [id number]      XR Knee 3 View Left    Narrative  XR KNEE 3 VW RIGHT, XR KNEE 3 VW LEFT    Date of Exam: 11/2/2023 3:29 PM CDT    Indication: fall    Comparison: None available.    Findings:  Evaluation of the right knee shows no evidence for acute fracture or acute alignment abnormality. There is advanced lateral compartment joint space narrowing with bone-on-bone changes and osteophyte formation. Patellofemoral joint space narrowing is  noted. There is a suprapatellar joint effusion.    Evaluation of the left knee shows no evidence for acute fracture or acute alignment abnormality. Patellofemoral joint space narrowing is noted. No joint effusion.    Impression  Impression:  No evidence for acute osseous injury to the right or left knee.    Advanced right knee lateral compartment degenerative arthrosis and joint effusion.      Electronically Signed: Karyn Faria MD  11/2/2023 3:54 PM CDT  Workstation ID: [id number]      XR Chest 2 View    Narrative  XR CHEST 2 VW    Date of Exam: 11/2/2023 3:29 PM CDT    Indication: fall    Comparison: CT chest 5/27/2021    Findings:  The heart is normal in size. The lungs are hyperinflated. No evidence for pneumothorax or pleural effusion. There is a nodule at the right lung base measuring 7 mm. No acute osseous injury noted.    Impression  Impression:  7 mm nodule at the right lung base. A follow-up  noncontrast CT thorax can be obtained for evaluation.      Electronically Signed: Karyn Faria MD  11/2/2023 3:55 PM CDT  Workstation ID: [id number]            ASSESSMENT / PLAN      PATT (acute kidney injury)    ARF/PATT------Nonoliguric. Appears to have ARF/PATT on top of CRF/CKD STG 2 with a baseline  Serum creatinine of about 0.9. Unknown if patient has baseline proteinuria or hematuria. CRF/CKD STG 2 most likely secondary to early DM nephropathy vs HTN NS. +ARF/PATT secondary to prerenal state/intravascular volume depletion and ATN from relative hypotension. No NSAIDs or IV dye. Check urine and serum studies and renal US to further characterize CKD.    2. HYPOCALCEMIA-------Replace IV and follow ionized levels    3. DMII--------BS ok. Glucometers, SSI    4. HTN WITH CKD-----BP ok this AM. Avoid hypotension. No ACE-I/ARB/DRI/diuretic for now    5. CAD/ELEVATED TROPONIN------No angina or gross overload. ECHO pending    6. HYPERLIPIDEMIA----CK and TSH ok    7. OA/DJD------No NSAIDs. Uric ok    8. KETONURIA/DEHYDRATION------IVFs    9. DELIRIUM------Resolved    10. S/P FALL-----Avoid hypotension. Cardiac w/u underway. CK ok    11. DVT PROPHYLAXIS------Lovenox    12. PUD PROPHYLAXIS-----Pepcid        I discussed the patient's findings and my recommendations with patient and nursing staff    Will follow along closely. Thank you for allowing us to see this patient in renal consultation.    [facility]  [phone]  MD Clark Matos MD  11/03/23  08:09 EDT              Electronically signed by Teddy Francis MD at 11/03/23 1018       Physical Therapy Notes (last 24 hours)  Notes from 11/02/23 1310 through 11/03/23 1310   No notes exist for this encounter.       Occupational Therapy Notes (last 24 hours)  Notes from 11/02/23 1310 through 11/03/23 1310   No notes exist for this encounter.

## 2023-11-03 NOTE — PLAN OF CARE
Goal Outcome Evaluation:  Plan of Care Reviewed With: patient           Outcome Evaluation: 87 y/o female presenting to [facility] with AMS. Pt with recent fall at home and Rhabdomyolysis. Pt with acute kidney injury, CAD, diabetes, HTN, and hyperlipidemia. Pt lives at home alone and per family report has been needing more assistance with ADLs and functional mobility. At time of evalution, pt A&OX3. Pt required mod A for bed mobility and STS. Pt presenting far below baseline and is unsafe to return home alone. OT recommending SNF at d/c. OT will follow.      Anticipated Discharge Disposition (OT): skilled nursing facility

## 2023-11-03 NOTE — H&P
[facility] Medicine Services  History & Physical    Patient Name: Rowan Guzman  : 1935  MRN: [id number]  Primary Care Physician:  Willa Arreola MD  Date of admission: 2023  Date and Time of Service: 23      Subjective      Chief Complaint: Confusion    ADDENDUM: THIS PATIENT HAS BEEN A PATIENT OF MINE FOR OVER 30 YEARS. SHE HAS HAD PROGRESSIVE DEMENTIA AND I HAVE HAD SAFETY  CONCERNS WHICH I HAVE EXPRESSED TO HER DAUGHTER  (ALANNAH) AND SON REPEATEDLY OVER THE PAST 3 YEARS. SHE MARGINALLY LIVES ALONE AT HOME AND I DO NOT FEEL THAT IT IS SAFE. SHE HAD AT LEAST 2 FALLS AT HOME, AND ON THE SECOND ONE, ALANNAH CALLED ME FOR ADVICE. I INSTRUCTED HER TO BE TAKEN TO THE ER/UCC FOR BHF. SHE WAS FOUND TO HAVE MILD RHABDO AND PATT, SHE HAS A LONGSTANDING HISTORY OF DM II, ASTHMA. DYSLIPIDEMIA (BUT HAS BEEN OFF OF MEDS DUE TO HER DEMENTIA AND GENERAL DECLINE. SHE HAS NOT REQUIRED DM II MEDS EITHER DUE TO DECREASED PO INTAKE IN RECENT YEARS. FAMILY CHECKS ON HER REGULARLY. SHE HAS BEEN VERY REFRACTORY TO ANY OTHER LIVING ARRANGEMENTS UNFORTUNATELY. WILL INVOLVE CASE MANAGEMENT FOR ALTERNATIVES. SHE WOULD DO WELL IN AN ASSISTED LIVING ENVIRONMENT. WILL ADDRESS CODE STATUS WITH FAMIL LATER THIS AM. WILL INVOLVE NEPHROLOGY AND CARDIOLOGY. VERY CONSERVATIVE CARE WILL BE ADVISED . THE ER/UCC UNFORTUNATELY CALLED THE HOSPITALIST AND NOT ME FOR REPORT AND ADMISSION. THE HOSPITALIST UNFORTUNATELY , EVEN WITH MY NAME AS PCP, STARTED THE H AND P AND ADMISSION/MANAGEMENT PROCESS. RN CALLED ME AND OF COURSE I WILL MANAGE MY PATIENT'S CARE. ALL PARTIES AGAIN REMINDED THAT I MANAGE AND ADMIT MY OWN PATIENTS . I APPRECIATE THE RN CALLING ME THIS AM. ORDERS ADDED/CHANGED.  THE DEMENTIA IS NOT NEW AND BY REPORT, SHE SEEMS TO BE AT HER BASELINE COGNITIVE FUNCTIONING LEVEL---O X 1-2 AT BEST    WILLA ARREOLA MD    History of Present Illness: Rowan Guzman is a 88 y.o. female who presented to [facility] on  11/2/2023 complaining of confusion.  Patient is a transfer from [facility].  Apparently, patient was found down by family at home.  Patient lives alone and able to take care of herself.  Per report patient's son found her on the ground after attempts to reach her were unsuccessful.  Work-up at [facility] showed elevated troponin 338, CK level 422, creatinine 1.97, WBC 12.4.  Head CT showed no acute intracranial abnormality.  Imaging of her hip and knees also negative for fracture.  Patient evaluated upon arrival here.  She is alert and oriented x2.  She is not able to state events leading up to her being found on the floor.  Denies any symptoms except bilateral leg pain.      ROS   As stated above.    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 Otherwise pertinent FHx was reviewed and not pertinent to current issue.    Social History:  reports that she quit smoking about 34 years ago. She has never used smokeless tobacco. She reports that she does not drink alcohol and does not use drugs.    Home Medications:  Prior to Admission Medications       Prescriptions Last Dose Informant Patient Reported? Taking?    isosorbide mononitrate (IMDUR) 30 MG 24 hr tablet   No No    TAKE 1 TABLET BY MOUTH EVERY DAY    alendronate  (FOSAMAX) 70 MG tablet   Yes No    aspirin (aspirin) 81 MG EC tablet   Yes No    Daily.    atorvastatin (LIPITOR) 40 MG tablet   Yes No    budesonide (PULMICORT) 0.5 MG/2ML nebulizer solution   Yes No    PULMICORT 0.5 MG/2ML SUSP    cetirizine (zyrTEC) 10 MG tablet   Yes No    ZYRTEC ALLERGY 10 MG TABS    FA-Pyridoxine-Cyanocobalamin (FOLBIC PO)   Yes No    FOLBIC TABS    fluconazole (DIFLUCAN) 200 MG tablet   No No    Take 1 tablet by mouth Daily.    gabapentin (NEURONTIN) 300 MG capsule   Yes No    ipratropium-albuterol (DUO-NEB) 0.5-2.5 mg/3 ml nebulizer   Yes No    DUONEB 0.5-2.5 (3) MG/3ML INHALATION SOLUTION    metoprolol succinate XL (TOPROL-XL) 25 MG 24 hr tablet   No No    TAKE 1 TABLET BY MOUTH EVERY DAY    montelukast (SINGULAIR) 10 MG tablet   Yes No    Multiple Vitamin (MULTI-DAY VITAMINS) tablet   Yes No    MULTI-DAY VITAMINS TABS    PREMARIN 0.625 MG/GM vaginal cream   Yes No    PROAIR  (90 Base) MCG/ACT inhaler   Yes No    vitamin C (ASCORBIC ACID) 500 MG tablet   Yes No    VITAMIN C 500 MG TABS              Allergies:  Allergies   Allergen Reactions    Penicillins Rash    Sulfa Antibiotics Diarrhea       Objective      Vitals:   Temp:  [97.3 °F (36.3 °C)-97.5 °F (36.4 °C)] 97.3 °F (36.3 °C)  Heart Rate:  [72-99] 72  Resp:  [12-21] 21  BP: ()/(43-70) 129/63    Physical Exam     ALERT AND EATING BREAKFAST. IT TOOK HER A BIT BUT SHE FINALLY CAME UP WITH MY NAME. THOUGHT SHE HAD BEEN IN AN ACCIDENT AND DID NOT RECALL WHY SHE IS HERE IN THE HOSPITAL. PLEASANT, TALKATIVE. O X 1    1 + LOWER EXTREMITY EDEMA    DISCUSSED CODE STATUS IN DETAIL WITH SON WHO WILL REACH OUT FOR A FINAL DECISION WITH SISTER AND BROTHER    DISCUSSED THAT SHE CANNOT SAFELY GO HOME ANY FURTHER    NEEDS SKILLED THEN ASSISTED LIVING ARRANGEMENTS    WILL AWAIT C AND S OF URINE, WILL ADD ANTIBIOTIC IV FOR NOW, AND WILL ADMIT FULLY  AWAITING REHAB FACILITY APPROVAL FOR A BED    JOANA ARREOLA MD  11/3/23  1PM    General  Appearance: AOO x 2, cooperative, no distress, appropriate for age  (PER HOSPITALIST  AND DONE IN ERROR)    Head:  Normocephalic, without obvious abnormality  Eyes:  PERRL, EOM's intact, conjunctivae and cornea clear  Nose:  Nares symmetrical, septum midline, mucosa pink  Throat:  Lips, tongue, and mucosa are moist, pink, and intact  Neck:  Supple; symmetrical, trachea midline, no adenopathy  Back:  Symmetrical, ROM normal, no CVA tenderness  Lungs: Respirations unlabored, no audible wheeze  Heart: Regular rate & rhythm, S1 and S2 normal  Abdomen:  Soft, nontender, bowel sounds active all four quadrants  Musculoskeletal: Bilateral leg swelling  Skin/Hair/Nails:  Skin warm, dry and intact, no rashes or abnormal dyspigmentation       Result Review    Result Review:  I have personally reviewed the results from the time of this admission to 11/3/2023 01:45 EDT and agree with these findings:  [x]  Laboratory  []  Microbiology  [x]  Radiology  []  EKG/Telemetry   []  Cardiology/Vascular   []  Pathology  []  Old records  []  Other:  Most notable findings include:       Assessment & Plan        Active Hospital Problems:  Active Hospital Problems    Diagnosis     **PATT (acute kidney injury)      Plan:   Fall  Altered mental status  Obtain MRI of the head---CANCELED MRI AS SHE IS AT HER BASELINE FOR DEMENTIA AND CT NOT REMARKABLE EXCEPT FOR AGE RELATED CHANGES  PT OT consult    Acute kidney injury  Creatinine 1.97  Start normal saline 125 cc an hour---WITH SWELLING REDUCED TO 75 CC/HR  Repeat BMP in the morning----CREAT IMPROVED; RENAL ULTRASOUND NORMAL (SMALL BENIGN CYST)    Rhabdomyolysis  Continue with IV fluids  Repeat CK level in the morning----IMPROVED QUICKLY WITH HYDRATION    Coronary artery disease  Elevated troponin  Trend troponin  EKG shows normal sinus rhythm  Patient denies chest pain  Consult cardiology------------------ECHO WITH GOOD EF%; MITRAL VALVE CALCIFICAITON SEVERE BUT NO CHANGES  NO INTERVENTION AT ALL  PLANNED    Diabetes  We will add sliding scale insulin and Accu-Cheks---EXCELLENT HGB A1C WITH NO MEDS  IF BLOOD GLUCOSE STABLE TODAY WILL DISCONTINUE ACCUCHECKS    Hypertension  Blood pressure stable  Resume home meds---STABLE    Hyperlipidemia  Resume statin----WE STOPPED THE STATIN ABOUT A YEAR AGO ; LIPIDS GOOD AND WE ELECTED TO LESSEN THE MEDS SHE IS ON WITH HER CURRENT DECLINING COGNITIVE STATE    DVT prophylaxis:  Medical DVT prophylaxis orders are present.    CODE STATUS:  WILL ADDRESS WITH FAMILY THIS AM---DISCUSSED IN DETAIL AND FINAL DECISION WILL BE MADE SOON AND ORDERS WILL REFLECT THAT LATER ON; LEANING FOR DNR/DNI/COMFORT     Admission Status:  I believe this patient meets Obs status.   WILL FULLY ADMIT AND USE IV ROCEPHIN FOR POSSIBLE UTI---CULTURES PENDING    I discussed the patient's findings and my recommendations with patient and family.        JOANA ARREOLA MD    11/3/23   1PM  ADDENDUM   11/03/23      Signature: Electronically signed by Manju Mcfarlane MD, 11/03/23, 01:45 EDT.  [facility], [place] Hospitalist Team    SIGNED BY JOANA ARREOLA MD PCP

## 2023-11-03 NOTE — THERAPY EVALUATION
Patient Name: Rowan Guzman  : 1935    MRN: [id number]                              Today's Date: 11/3/2023       Admit Date: 2023    Visit Dx:     ICD-10-CM ICD-9-CM   1. Non-traumatic rhabdomyolysis  M62.82 728.88   2. Elevated troponin  R79.89 790.6   3. Acute renal failure, unspecified acute renal failure type  N17.9 584.9   4. Pulmonary nodule  R91.1 793.11     Patient Active Problem List   Diagnosis    Abnormal cardiovascular stress test    Acute bacterial bronchitis    Arthritis    Vaginitis    Asthma    Bradycardia    Chronic coronary artery disease    Claudication    Diabetes mellitus    Dizziness    Dyspnea on exertion    Edema    Elevated blood pressure reading without diagnosis of hypertension    Family history of diabetes mellitus    History of left heart catheterization (LHC)    Hyperlipidemia    Hypertension    Melanoma    Mitral valve prolapse    Neck pain    Paroxysmal supraventricular tachycardia    Syncope    Thymoma    PATT (acute kidney injury)    Rhabdomyolysis     Past Medical History:   Diagnosis Date    Asthma     Diabetes mellitus     type 2    Heart disease     Hyperlipidemia      Past Surgical History:   Procedure Laterality Date    ABDOMINAL SURGERY      cleaned adhesions    APPENDECTOMY      BREAST LUMPECTOMY Right     CARDIAC CATHETERIZATION  06/10/2015    COLONOSCOPY      CORONARY ANGIOPLASTY WITH STENT PLACEMENT  2017    stent to [facility]    SKIN CANCER EXCISION      melanoma removal    THYMECTOMY Left 2018    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      General Information       Row Name 23 1335          OT Time and Intention    Document Type evaluation  -BL     Mode of Treatment individual therapy  -BL       Row Name 23 1339          General Information    Patient Profile Reviewed yes  -BL     Prior Level of Function independent:;ADL's;all household mobility  Pts family reports she has required increased assistance for  mobility and ADLs  -     Existing Precautions/Restrictions fall  -       Row Name 11/03/23 1335          Living Environment    People in Home alone  -       Row Name 11/03/23 1335          Cognition    Orientation Status (Cognition) oriented x 3;disoriented to;situation  -       Row Name 11/03/23 1335          Safety Issues, Functional Mobility    Safety Issues Affecting Function (Mobility) ability to follow commands;awareness of need for assistance;impulsivity;judgment  -     Impairments Affecting Function (Mobility) cognition;endurance/activity tolerance;pain  -     Cognitive Impairments, Mobility Safety/Performance awareness, need for assistance;insight into deficits/self-awareness;judgment;problem-solving/reasoning  -               User Key  (r) = Recorded By, (t) = Taken By, (c) = Cosigned By      Initials Name Provider Type     Jackie Rivera OT Occupational Therapist                     Mobility/ADL's       Row Name 11/03/23 1339          Bed Mobility    Bed Mobility supine-sit  -     Supine-Sit [place] (Bed Mobility) moderate assist (50% patient effort)  -     Bed Mobility, Safety Issues cognitive deficits limit understanding  -     Assistive Device (Bed Mobility) bed rails  -       Row Name 11/03/23 1339          Transfers    Transfers sit-stand transfer  -       Row Name 11/03/23 1339          Sit-Stand Transfer    Sit-Stand [place] (Transfers) moderate assist (50% patient effort)  -     Assistive Device (Sit-Stand Transfers) walker, front-wheeled  -               User Key  (r) = Recorded By, (t) = Taken By, (c) = Cosigned By      Initials Name Provider Type     Jackie Rivera OT Occupational Therapist                   Obj/Interventions       Row Name 11/03/23 1345          Sensory Assessment (Somatosensory)    Sensory Assessment (Somatosensory) sensation intact  -[facility] Name 11/03/23 1345          Vision Assessment/Intervention    Visual  Impairment/Limitations WFL  -[facility] Name 11/03/23 1345          Range of Motion Comprehensive    General Range of Motion no range of motion deficits identified  -BL       Row Name 11/03/23 1345          Strength Comprehensive (MMT)    Comment, General Manual Muscle Testing (MMT) Assessment BUE grossly 4/5  -[facility] Name 11/03/23 1345          Motor Skills    Motor Skills functional endurance  -     Functional Endurance Fair  -BL       Row Name 11/03/23 1345          Balance    Balance Assessment sitting static balance;sitting dynamic balance;standing static balance;standing dynamic balance  -BL     Static Sitting Balance independent  -BL     Dynamic Sitting Balance independent  -BL     Position, Sitting Balance unsupported  -BL     Static Standing Balance minimal assist  -BL     Dynamic Standing Balance minimal assist  -BL     Position/Device Used, Standing Balance supported;walker, front-wheeled  -               User Key  (r) = Recorded By, (t) = Taken By, (c) = Cosigned By      Initials Name Provider Type    BL Jackie Rivera, OT Occupational Therapist                   Goals/Plan       Row Name 11/03/23 1353          Transfer Goal 1 (OT)    Activity/Assistive Device (Transfer Goal 1, OT) sit-to-stand/stand-to-sit;toilet  -BL     [place] Level/Cues Needed (Transfer Goal 1, OT) minimum assist (75% or more patient effort)  -BL     Time Frame (Transfer Goal 1, OT) 2 weeks  -BL       Row Name 11/03/23 1353          Dressing Goal 1 (OT)    Activity/Device (Dressing Goal 1, OT) dressing skills, all  -BL     [place]/Cues Needed (Dressing Goal 1, OT) contact guard required  -BL     Time Frame (Dressing Goal 1, OT) 2 weeks  -BL       Row Name 11/03/23 1353          Toileting Goal 1 (OT)    Activity/Device (Toileting Goal 1, OT) toileting skills, all  -BL     [place] Level/Cues Needed (Toileting Goal 1, OT) minimum assist (75% or more patient effort)  -BL     Time Frame (Toileting Goal 1, OT)  2 weeks  -       Row Name 11/03/23 1353          Therapy Assessment/Plan (OT)    Planned Therapy Interventions (OT) activity tolerance training;adaptive equipment training;BADL retraining;cognitive/visual perception retraining;edema control/reduction;functional balance retraining;IADL retraining;neuromuscular control/coordination retraining;occupation/activity based interventions;passive ROM/stretching;patient/caregiver education/training;ROM/therapeutic exercise;strengthening exercise;transfer/mobility retraining  -               User Key  (r) = Recorded By, (t) = Taken By, (c) = Cosigned By      Initials Name Provider Type    BL Jackie Rivera OT Occupational Therapist                   Clinical Impression       Row Name 11/03/23 1346          Pain Assessment    Pretreatment Pain Rating 0/10 - no pain  -     Posttreatment Pain Rating 0/10 - no pain  -[facility] Name 11/03/23 1346          Plan of Care Review    Plan of Care Reviewed With patient  -BL     Outcome Evaluation 87 y/o female presenting to [facility] with AMS. Pt with recent fall at home and Rhabdomyolysis. Pt with acute kidney injury, CAD, diabetes, HTN, and hyperlipidemia. Pt lives at home alone and per family report has been needing more assistance with ADLs and functional mobility. At time of evalution, pt A&OX3. Pt required mod A for bed mobility and STS. Pt presenting far below baseline and is unsafe to return home alone. OT recommending SNF at d/c. OT will follow.  -       Row Name 11/03/23 1346          Therapy Assessment/Plan (OT)    Criteria for Skilled Therapeutic Interventions Met (OT) yes;skilled treatment is necessary  -     Therapy Frequency (OT) 5 times/wk  -       Row Name 11/03/23 1346          Therapy Plan Review/Discharge Plan (OT)    Anticipated Discharge Disposition (OT) skilled nursing facility  -       Row Name 11/03/23 1348          Positioning and Restraints    Pre-Treatment Position in bed  -     Post Treatment  Position chair  -BL     In Chair notified nsg;call light within reach;encouraged to call for assist;exit alarm on;with family/caregiver  -BL               User Key  (r) = Recorded By, (t) = Taken By, (c) = Cosigned By      Initials Name Provider Type     Jackie Rivera OT Occupational Therapist                   Outcome Measures       Row Name 11/03/23 1334 11/03/23 0828       How much help from another person do you currently need...    Turning from your back to your side while in flat bed without using bedrails? 4  -OD 4  -RM    Moving from lying on back to sitting on the side of a flat bed without bedrails? 3  -OD 4  -RM    Moving to and from a bed to a chair (including a wheelchair)? 3  -OD 3  -RM    Standing up from a chair using your arms (e.g., wheelchair, bedside chair)? 3  -OD 3  -RM    Climbing 3-5 steps with a railing? 2  -OD 3  -RM    To walk in hospital room? 3  -OD 3  -RM    AM-PAC 6 Clicks Score (PT) 18  -OD 20  -RM    Highest level of mobility 6 --> Walked 10 steps or more  -OD 6 --> Walked 10 steps or more  -RM      Row Name 11/03/23 1334          Functional Assessment    Outcome Measure Options AM-PAC 6 Clicks Basic Mobility (PT)  -OD               User Key  (r) = Recorded By, (t) = Taken By, (c) = Cosigned By      Initials Name Provider Type     Leyda Herrera LPN Licensed Nurse    Romina Zelaya PT Physical Therapist                    Occupational Therapy Education       Title: PT OT SLP Therapies (Done)       Topic: Occupational Therapy (Done)       Point: ADL training (Done)       Description:   Instruct learner(s) on proper safety adaptation and remediation techniques during self care or transfers.   Instruct in proper use of assistive devices.                  Learning Progress Summary             Patient Acceptance, E,TB, VU,NR by  at 11/3/2023 1357                                         User Key       Initials Effective Dates Name Provider Type [facility] 09/22/22 -   Jackie Rivera OT Occupational Therapist OT                  OT Recommendation and Plan  Planned Therapy Interventions (OT): activity tolerance training, adaptive equipment training, BADL retraining, cognitive/visual perception retraining, edema control/reduction, functional balance retraining, IADL retraining, neuromuscular control/coordination retraining, occupation/activity based interventions, passive ROM/stretching, patient/caregiver education/training, ROM/therapeutic exercise, strengthening exercise, transfer/mobility retraining  Therapy Frequency (OT): 5 times/wk  Plan of Care Review  Plan of Care Reviewed With: patient  Outcome Evaluation: 89 y/o female presenting to [facility] with AMS. Pt with recent fall at home and Rhabdomyolysis. Pt with acute kidney injury, CAD, diabetes, HTN, and hyperlipidemia. Pt lives at home alone and per family report has been needing more assistance with ADLs and functional mobility. At time of evalution, pt A&OX3. Pt required mod A for bed mobility and STS. Pt presenting far below baseline and is unsafe to return home alone. OT recommending SNF at d/c. OT will follow.     Time Calculation:         Time Calculation- OT       Row Name 11/03/23 1358             Time Calculation- OT    OT Start Time 1059  -BL      OT Stop Time 1123  -BL      OT Time Calculation (min) 24 min  -BL      OT Received On 11/03/23  -BL      OT - Next Appointment 11/06/23  -BL      OT Goal Re-Cert Due Date 11/17/23  -BL                User Key  (r) = Recorded By, (t) = Taken By, (c) = Cosigned By      Initials Name Provider Type    BL Jackie Rivera OT Occupational Therapist                  Therapy Charges for Today       Code Description Service Date Service Provider Modifiers Qty    [id number]  OT EVAL MOD COMPLEXITY 4 11/3/2023 Jackie Rivera OT GO 1                 Jackie Rivera OT  11/3/2023

## 2023-11-03 NOTE — NURSING NOTE
Spoke with patients son about patients code status. Patients son talked to his siblings about patient(mom) code status & what about what to do about code status. The family wishes for patient to be a DNR/DNI. Call placed to Dr. Martinez at this time about change of code status & the family wanting patients code status to be DNR/DNR.

## 2023-11-03 NOTE — PLAN OF CARE
Problem: Adult Inpatient Plan of Care  Goal: Plan of Care Review  Outcome: Ongoing, Progressing  Goal: Patient-Specific Goal (Individualized)  Outcome: Ongoing, Progressing  Goal: Absence of Hospital-Acquired Illness or Injury  Outcome: Ongoing, Progressing  Intervention: Identify and Manage Fall Risk  Recent Flowsheet Documentation  Taken 11/3/2023 1000 by Leyda Herrera LPN  Safety Promotion/Fall Prevention: patient off unit  Taken 11/3/2023 0828 by Leyda Herrera LPN  Safety Promotion/Fall Prevention:   activity supervised   assistive device/personal items within reach   clutter free environment maintained   lighting adjusted   room organization consistent   safety round/check completed  Intervention: Prevent Skin Injury  Recent Flowsheet Documentation  Taken 11/3/2023 0828 by Leyda Herrera LPN  Skin Protection:   adhesive use limited   tubing/devices free from skin contact  Intervention: Prevent and Manage VTE (Venous Thromboembolism) Risk  Recent Flowsheet Documentation  Taken 11/3/2023 0828 by Leyda Herrera LPN  VTE Prevention/Management:   bilateral   sequential compression devices off   patient refused intervention  Intervention: Prevent Infection  Recent Flowsheet Documentation  Taken 11/3/2023 0828 by Leyda Herrera LPN  Infection Prevention:   cohorting utilized   environmental surveillance performed   hand hygiene promoted   rest/sleep promoted   personal protective equipment utilized   single patient room provided   visitors restricted/screened  Goal: Optimal Comfort and Wellbeing  Outcome: Ongoing, Progressing  Intervention: Monitor Pain and Promote Comfort  Recent Flowsheet Documentation  Taken 11/3/2023 0828 by Leyda Herrera LPN  Pain Management Interventions:   care clustered   diversional activity provided   pain pump in use   pillow support provided  Intervention: Provide Person-Centered Care  Recent Flowsheet Documentation  Taken 11/3/2023 0828 by Leyda Herrera LPN  Trust  Relationship/Rapport:   care explained   choices provided   thoughts/feelings acknowledged  Goal: Readiness for Transition of Care  Outcome: Ongoing, Progressing     Problem: Hypertension Comorbidity  Goal: Blood Pressure in Desired Range  Outcome: Ongoing, Progressing  Intervention: Maintain Blood Pressure Management  Recent Flowsheet Documentation  Taken 11/3/2023 0828 by Leyda Herrera LPN  Syncope Management: position changed slowly  Medication Review/Management: medications reviewed     Problem: Skin Injury Risk Increased  Goal: Skin Health and Integrity  Outcome: Ongoing, Progressing  Intervention: Optimize Skin Protection  Recent Flowsheet Documentation  Taken 11/3/2023 0828 by Leyda Herrera LPN  Pressure Reduction Techniques:   frequent weight shift encouraged   rest period provided between sit times  Pressure Reduction Devices: pressure-redistributing mattress utilized  Skin Protection:   adhesive use limited   tubing/devices free from skin contact     Problem: Fall Injury Risk  Goal: Absence of Fall and Fall-Related Injury  Outcome: Ongoing, Progressing  Intervention: Identify and Manage Contributors  Recent Flowsheet Documentation  Taken 11/3/2023 0828 by Leyda Herrera LPN  Medication Review/Management: medications reviewed  Intervention: Promote Injury-Free Environment  Recent Flowsheet Documentation  Taken 11/3/2023 1000 by Leyda Herrera LPN  Safety Promotion/Fall Prevention: patient off unit  Taken 11/3/2023 0828 by Leyda Herrera LPN  Safety Promotion/Fall Prevention:   activity supervised   assistive device/personal items within reach   clutter free environment maintained   lighting adjusted   room organization consistent   safety round/check completed   Goal Outcome Evaluation:          Patient went down earlier in shift for renal ultrasound & Echo. No results have been seen  yet. Patient on IV fluids per order. Patient got up with therapy to chair this shift. Patient needed 2 assist  with transfers this shift. Up to bedside commode this shift. Patient has no SOB or cough noted at this time. Call light within reach.

## 2023-11-03 NOTE — CONSULTS
NEPHROLOGY CONSULTATION-----[facility]    [facility]  [phone]  Clark Francis MD    Patient Care Team:  Willa Martinez MD as PCP - General  Willa Martinez MD as PCP - Family Medicine  Amor López MD as Consulting Physician (Cardiology)  Teddy Francis MD as Consulting Physician (Nephrology)    CC/REASON FOR CONSULTATION: RENAL FAILURE/ELEVATED SERUM CREATININE    PHYSICIAN REQUESTING CONSULTATION:     History of Present Illness    HPI    Patient is a 88 y.o. WF whom I was asked to see in consultation for evaluation and management of renal failure/elevated serum creatinine. Patient was admitted after presenting to ER with c/o MS changes and s/p fall. Patient denies prior knowledge of functional kidney disease, proteinuria, or hematuria. No NSAIDs or recent IV dye exposure. No known h/o hepatitis, TB, rheumatic fever, jaundice, SLE, bleeding/bruising disorders.  No urinary sx. No edema or fluid retention.  +Compliance with home meds. Was not on diuretics prior to admission. Was not on ACE-I/ARB prior to admission. No herbal med use. +Hypotension on admission    Review of Systems   Constitutional:  Positive for activity change, appetite change and fatigue. Negative for chills, diaphoresis, fever and unexpected weight change.   HENT:  Negative for congestion, dental problem, drooling, ear discharge, ear pain, facial swelling, hearing loss, mouth sores, nosebleeds, postnasal drip, rhinorrhea, sinus pressure, sinus pain, sneezing, sore throat, tinnitus, trouble swallowing and voice change.    Eyes:  Negative for photophobia, pain, discharge, redness, itching and visual disturbance.   Respiratory:  Negative for apnea, cough, choking, chest tightness, shortness of breath, wheezing and stridor.    Cardiovascular:  Negative for chest pain, palpitations and leg swelling.   Gastrointestinal:  Negative for abdominal  distention, abdominal pain, anal bleeding, blood in stool, constipation, diarrhea, nausea, rectal pain and vomiting.   Endocrine: Negative for cold intolerance, heat intolerance, polydipsia, polyphagia and polyuria.   Genitourinary:  Positive for difficulty urinating. Negative for decreased urine volume, dysuria, enuresis, flank pain, frequency, genital sores, hematuria and urgency.   Musculoskeletal:  Positive for arthralgias and back pain. Negative for gait problem, joint swelling, myalgias, neck pain and neck stiffness.   Skin:  Negative for color change, pallor, rash and wound.   Allergic/Immunologic: Negative for environmental allergies, food allergies and immunocompromised state.   Neurological:  Positive for weakness. Negative for dizziness, tremors, seizures, syncope, facial asymmetry, speech difficulty, light-headedness, numbness and headaches.   Hematological:  Negative for adenopathy. Does not bruise/bleed easily.   Psychiatric/Behavioral:  Negative for agitation, behavioral problems, confusion, decreased concentration, dysphoric mood, hallucinations, self-injury, sleep disturbance and suicidal ideas. The patient is not nervous/anxious and is not hyperactive.           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       Family History   Problem Relation Age of Onset    Hyperlipidemia Mother     Diabetes Mother     Heart attack Mother     Heart disease Father     Hypertension Father        Social History     Tobacco Use     Smoking status: Former     Types: Cigarettes     Quit date: 1989     Years since quittin.1    Smokeless tobacco: Never   Vaping Use    Vaping Use: Never used   Substance Use Topics    Alcohol use: No    Drug use: No       Home Meds:   Medications Prior to Admission   Medication Sig Dispense Refill Last Dose    isosorbide mononitrate (IMDUR) 30 MG 24 hr tablet TAKE 1 TABLET BY MOUTH EVERY DAY 90 tablet 3     aspirin (aspirin) 81 MG EC tablet Daily.       budesonide (PULMICORT) 0.5 MG/2ML nebulizer solution PULMICORT 0.5 MG/2ML SUSP       cetirizine (zyrTEC) 10 MG tablet ZYRTEC ALLERGY 10 MG TABS       gabapentin (NEURONTIN) 300 MG capsule        ipratropium-albuterol (DUO-NEB) 0.5-2.5 mg/3 ml nebulizer DUONEB 0.5-2.5 (3) MG/3ML INHALATION SOLUTION       montelukast (SINGULAIR) 10 MG tablet           Scheduled Meds:  aspirin, 81 mg, Oral, Daily  budesonide, 0.5 mg, Nebulization, BID - RT  cetirizine, 10 mg, Oral, Daily  enoxaparin, 30 mg, Subcutaneous, Daily  gabapentin, 300 mg, Oral, Nightly  insulin lispro, 2-7 Units, Subcutaneous, 4x Daily AC & at Bedtime  ipratropium-albuterol, 3 mL, Nebulization, TID - RT  isosorbide mononitrate, 30 mg, Oral, Daily  montelukast, 10 mg, Oral, Nightly  sodium chloride, 10 mL, Intravenous, Q12H  sodium chloride, 10 mL, Intravenous, Q12H        Continuous Infusions:  Pharmacy to Dose enoxaparin (LOVENOX),   sodium chloride, 75 mL/hr, Last Rate: 75 mL/hr (23 0536)        PRN Meds:    acetaminophen    aluminum-magnesium hydroxide-simethicone    calcium carbonate    dextrose    dextrose    glucagon (human recombinant)    nitroglycerin    ondansetron **OR** ondansetron    Pharmacy to Dose enoxaparin (LOVENOX)    [COMPLETED] Insert peripheral IV **AND** sodium chloride    sodium chloride    sodium chloride    sodium chloride    sodium chloride    Allergies:  Penicillins and Sulfa antibiotics    OBJECTIVE    Vital Signs  Temp:  [97.3 °F (36.3 °C)-98.1 °F (36.7 °C)] 98.1  "°F (36.7 °C)  Heart Rate:  [72-99] 85  Resp:  [12-22] 22  BP: ()/(43-70) 147/66    No intake/output data recorded.  I/O last 3 completed shifts:  In: 2000 [I.V.:1000; IV Piggyback:1000]  Out: 200 [Urine:200]    Physical Exam:  General Appearance: alert, appears stated age and cooperative  Head: normocephalic, without obvious abnormality and atraumatic +DRY OP  Eyes: conjunctivae and sclerae normal and no icterus  Neck: supple and no JVD  Lungs: clear to auscultation and respirations regular  Heart: regular rhythm & normal rate and normal S1, S2  Chest Wall: no abnormalities observed  Abdomen: normal bowel sounds and soft, nontender  Extremities: moves extremities well, no edema, no cyanosis +DJD  Skin: no bleeding, bruising or rash +DECREASED SKIN TURGOR  Neurologic: Alert, and oriented. No focal deficits    Results Review:    I reviewed the patient's new clinical results.    WBC WBC   Date Value Ref Range Status   11/03/2023 11.40 (H) 3.40 - 10.80 10*3/mm3 Final   11/02/2023 12.47 (H) 3.40 - 10.80 10*3/mm3 Final      HGB Hemoglobin   Date Value Ref Range Status   11/03/2023 11.9 (L) 12.0 - 15.9 g/dL Final   11/02/2023 13.1 12.0 - 15.9 g/dL Final      HCT Hematocrit   Date Value Ref Range Status   11/03/2023 35.3 34.0 - 46.6 % Final   11/02/2023 40.5 34.0 - 46.6 % Final      Platelets No results found for: \"LABPLAT\"   MCV MCV   Date Value Ref Range Status   11/03/2023 91.6 79.0 - 97.0 fL Final   11/02/2023 93.5 79.0 - 97.0 fL Final          Sodium Sodium   Date Value Ref Range Status   11/03/2023 141 136 - 145 mmol/L Final   11/02/2023 134 (L) 136 - 145 mmol/L Final      Potassium Potassium   Date Value Ref Range Status   11/03/2023 3.8 3.5 - 5.2 mmol/L Final     Comment:     Slight hemolysis detected by analyzer. Results may be affected.   11/02/2023 3.8 3.5 - 5.2 mmol/L Final      Chloride Chloride   Date Value Ref Range Status   11/03/2023 109 (H) 98 - 107 mmol/L Final   11/02/2023 99 98 - 107 mmol/L Final " "     CO2 CO2   Date Value Ref Range Status   11/03/2023 22.0 22.0 - 29.0 mmol/L Final   11/02/2023 24.8 22.0 - 29.0 mmol/L Final      BUN BUN   Date Value Ref Range Status   11/03/2023 24 (H) 8 - 23 mg/dL Final   11/02/2023 26 (H) 8 - 23 mg/dL Final      Creatinine Creatinine   Date Value Ref Range Status   11/03/2023 0.91 0.57 - 1.00 mg/dL Final   11/02/2023 1.97 (H) 0.57 - 1.00 mg/dL Final      Calcium Calcium   Date Value Ref Range Status   11/03/2023 8.0 (L) 8.6 - 10.5 mg/dL Final   11/02/2023 9.2 8.6 - 10.5 mg/dL Final      PO4 No results found for: \"CAPO4\"   Albumin Albumin   Date Value Ref Range Status   11/03/2023 2.9 (L) 3.5 - 5.2 g/dL Final   11/02/2023 3.8 3.5 - 5.2 g/dL Final      Magnesium Magnesium   Date Value Ref Range Status   11/03/2023 1.9 1.6 - 2.4 mg/dL Final   11/02/2023 2.0 1.6 - 2.4 mg/dL Final      Uric Acid No results found for: \"URICACID\"       Imaging Results (Last 72 Hours)       Procedure Component Value Units Date/Time    CT Head Without Contrast [[id number]] Collected: 11/02/23 1652     Updated: 11/02/23 1700    Narrative:      CT HEAD WO CONTRAST, CT CERVICAL SPINE WO CONTRAST    Date of Exam: 11/2/2023 3:28 PM CDT    Indication: Head trauma, minor (Age >= 65y)  Head trauma, moderate-severe.    Comparison: None available.    Technique: Axial CT images were obtained of the head and cervical spine without contrast administration.  Coronal in sagittal reconstructions were performed.  Automated exposure control and iterative reconstruction methods were used.      Findings:  CT HEAD:  There is no evidence of acute infarction.    There there is no acute intracranial hemorrhage. There are no extra-axial collections.    Ventricles and CSF spaces are symmetrically prominent. No mass effect nor hydrocephalus.    Moderate nonspecific white matter hypoattenuation suggestive of microvascular scheming disease without mass effect. There are probable old bilateral basal ganglia lacunar type " infarcts.     Paranasal sinuses and mastoid air cells are adequately aerated.     Osseous structures and orbits appear intact.        CT CERVICAL SPINE:  OSSEOUS STRUCTURES: No fracture nor bony destructive process.    ALIGNMENT: There is grade 1 spondylolisthesis at C4/5.    DISC SPACE: Moderate lower cervical spondylosis.    JOINTS: Moderate mid cervical facet arthropathy.    SOFT TISSUES:  Unremarkable    LUNG APICES: Unremarkable    LEVELS: There is moderate osseous left neuroforaminal stenosis at C4/5.      Impression:      Impression:  1.No acute traumatic injury within the head nor cervical spine identified.  2.Age-related changes as detailed above.      Electronically Signed: Kash Faria MD    11/2/2023 3:58 PM CDT    Workstation ID: [id number]    CT Cervical Spine Without Contrast [[id number]] Collected: 11/02/23 1652     Updated: 11/02/23 1700    Narrative:      CT HEAD WO CONTRAST, CT CERVICAL SPINE WO CONTRAST    Date of Exam: 11/2/2023 3:28 PM CDT    Indication: Head trauma, minor (Age >= 65y)  Head trauma, moderate-severe.    Comparison: None available.    Technique: Axial CT images were obtained of the head and cervical spine without contrast administration.  Coronal in sagittal reconstructions were performed.  Automated exposure control and iterative reconstruction methods were used.      Findings:  CT HEAD:  There is no evidence of acute infarction.    There there is no acute intracranial hemorrhage. There are no extra-axial collections.    Ventricles and CSF spaces are symmetrically prominent. No mass effect nor hydrocephalus.    Moderate nonspecific white matter hypoattenuation suggestive of microvascular scheming disease without mass effect. 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     Paranasal sinuses and mastoid air cells are adequately aerated.     Osseous structures and orbits appear intact.        CT CERVICAL SPINE:  OSSEOUS STRUCTURES: No fracture nor bony  destructive process.    ALIGNMENT: There is grade 1 spondylolisthesis at C4/5.    DISC SPACE: Moderate lower cervical spondylosis.    JOINTS: Moderate mid cervical facet arthropathy.    SOFT TISSUES:  Unremarkable    LUNG APICES: Unremarkable    LEVELS: There is moderate osseous left neuroforaminal stenosis at C4/5.      Impression:      Impression:  1.No acute traumatic injury within the head nor cervical spine identified.  2.Age-related changes as detailed above.      Electronically Signed: Kash Faria MD    11/2/2023 3:58 PM CDT    Workstation ID: [id number]    XR Chest 2 View [[id number]] Collected: 11/02/23 1654     Updated: 11/02/23 1657    Narrative:      XR CHEST 2 VW    Date of Exam: 11/2/2023 3:29 PM CDT    Indication: fall    Comparison: CT chest 5/27/2021    Findings:  The heart is normal in size. The lungs are hyperinflated. No evidence for pneumothorax or pleural effusion. There is a nodule at the right lung base measuring 7 mm. No acute osseous injury noted.      Impression:      Impression:  7 mm nodule at the right lung base. A follow-up noncontrast CT thorax can be obtained for evaluation.      Electronically Signed: Karyn Faria MD    11/2/2023 3:55 PM CDT    Workstation ID: [id number]    XR Hip With or Without Pelvis 2 - 3 View Left [[id number]] Collected: 11/02/23 1652     Updated: 11/02/23 1657    Narrative:      XR HIP W OR WO PELVIS 2-3 VIEW LEFT    Date of Exam: 11/2/2023 4:29 PM EDT    Indication: fall    Comparison: None available.    Findings:  No definite acute fracture or dislocation. Please note that evaluation of the left pelvic rim is mildly degraded due to patient positioning.    Extensive degenerative changes of the left hip with deformity of the left femoral head with associated adjacent sclerosis.  Mild degenerative changes of the right hip.    No suspicious soft tissue findings. Mild stool burden in the ascending colon      Impression:      Impression:  No definite acute  fracture or dislocation.    Extensive degenerative changes with chronic appearing deformity of the left femoral head. Underlying AVN is not excluded.      Electronically Signed: Johnny Ventura DO    11/2/2023 4:55 PM EDT    Workstation ID: [id number]    XR Knee 3 View Left [[id number]] Collected: 11/02/23 1652     Updated: 11/02/23 1656    Narrative:      XR KNEE 3 VW RIGHT, XR KNEE 3 VW LEFT    Date of Exam: 11/2/2023 3:29 PM CDT    Indication: fall    Comparison: None available.    Findings:  Evaluation of the right knee shows no evidence for acute fracture or acute alignment abnormality. There is advanced lateral compartment joint space narrowing with bone-on-bone changes and osteophyte formation. Patellofemoral joint space narrowing is   noted. There is a suprapatellar joint effusion.    Evaluation of the left knee shows no evidence for acute fracture or acute alignment abnormality. Patellofemoral joint space narrowing is noted. No joint effusion.      Impression:      Impression:  No evidence for acute osseous injury to the right or left knee.    Advanced right knee lateral compartment degenerative arthrosis and joint effusion.      Electronically Signed: Karyn Faria MD    11/2/2023 3:54 PM CDT    Workstation ID: [id number]    XR Knee 3 View Right [[id number]] Collected: 11/02/23 1652     Updated: 11/02/23 1656    Narrative:      XR KNEE 3 VW RIGHT, XR KNEE 3 VW LEFT    Date of Exam: 11/2/2023 3:29 PM CDT    Indication: fall    Comparison: None available.    Findings:  Evaluation of the right knee shows no evidence for acute fracture or acute alignment abnormality. There is advanced lateral compartment joint space narrowing with bone-on-bone changes and osteophyte formation. Patellofemoral joint space narrowing is   noted. There is a suprapatellar joint effusion.    Evaluation of the left knee shows no evidence for acute fracture or acute alignment abnormality. Patellofemoral joint space narrowing is noted.  No joint effusion.      Impression:      Impression:  No evidence for acute osseous injury to the right or left knee.    Advanced right knee lateral compartment degenerative arthrosis and joint effusion.      Electronically Signed: Karyn Faria MD    11/2/2023 3:54 PM CDT    Workstation ID: [id number]    XR Elbow 3+ View Left [[id number]] Collected: 11/02/23 1651     Updated: 11/02/23 1654    Narrative:      XR ELBOW 3+ VW LEFT    Date of Exam: 11/2/2023 4:29 PM EDT    Indication: fall    Comparison: None available.    Findings:  There is no evidence of fracture or dislocation.  No focal lesions identified. No erosions.    Mild degenerative changes.    No focal soft tissue abnormalities identified.      Impression:      Impression:  No acute abnormality.      Electronically Signed: Johnny Ventura DO    11/2/2023 4:52 PM EDT    Workstation ID: [id number]              Results for orders placed during the hospital encounter of 11/02/23    XR Knee 3 View Right    Narrative  XR KNEE 3 VW RIGHT, XR KNEE 3 VW LEFT    Date of Exam: 11/2/2023 3:29 PM CDT    Indication: fall    Comparison: None available.    Findings:  Evaluation of the right knee shows no evidence for acute fracture or acute alignment abnormality. There is advanced lateral compartment joint space narrowing with bone-on-bone changes and osteophyte formation. Patellofemoral joint space narrowing is  noted. There is a suprapatellar joint effusion.    Evaluation of the left knee shows no evidence for acute fracture or acute alignment abnormality. Patellofemoral joint space narrowing is noted. No joint effusion.    Impression  Impression:  No evidence for acute osseous injury to the right or left knee.    Advanced right knee lateral compartment degenerative arthrosis and joint effusion.      Electronically Signed: Karyn Faria MD  11/2/2023 3:54 PM CDT  Workstation ID: [id number]      XR Knee 3 View Left    Narrative  XR KNEE 3 VW RIGHT, XR KNEE 3 VW  LEFT    Date of Exam: 11/2/2023 3:29 PM CDT    Indication: fall    Comparison: None available.    Findings:  Evaluation of the right knee shows no evidence for acute fracture or acute alignment abnormality. There is advanced lateral compartment joint space narrowing with bone-on-bone changes and osteophyte formation. Patellofemoral joint space narrowing is  noted. There is a suprapatellar joint effusion.    Evaluation of the left knee shows no evidence for acute fracture or acute alignment abnormality. Patellofemoral joint space narrowing is noted. No joint effusion.    Impression  Impression:  No evidence for acute osseous injury to the right or left knee.    Advanced right knee lateral compartment degenerative arthrosis and joint effusion.      Electronically Signed: Karyn Faria MD  11/2/2023 3:54 PM CDT  Workstation ID: [id number]      XR Chest 2 View    Narrative  XR CHEST 2 VW    Date of Exam: 11/2/2023 3:29 PM CDT    Indication: fall    Comparison: CT chest 5/27/2021    Findings:  The heart is normal in size. The lungs are hyperinflated. No evidence for pneumothorax or pleural effusion. There is a nodule at the right lung base measuring 7 mm. No acute osseous injury noted.    Impression  Impression:  7 mm nodule at the right lung base. A follow-up noncontrast CT thorax can be obtained for evaluation.      Electronically Signed: Karyn Faria MD  11/2/2023 3:55 PM CDT  Workstation ID: [id number]            ASSESSMENT / PLAN      PATT (acute kidney injury)    ARF/PATT------Nonoliguric. Appears to have ARF/PATT on top of CRF/CKD STG 2 with a baseline  Serum creatinine of about 0.9. Unknown if patient has baseline proteinuria or hematuria. CRF/CKD STG 2 most likely secondary to early DM nephropathy vs HTN NS. +ARF/PATT secondary to prerenal state/intravascular volume depletion and ATN from relative hypotension. No NSAIDs or IV dye. Check urine and serum studies and renal US to further characterize CKD.    2.  HYPOCALCEMIA-------Replace IV and follow ionized levels    3. DMII--------BS ok. Glucometers, SSI    4. HTN WITH CKD-----BP ok this AM. Avoid hypotension. No ACE-I/ARB/DRI/diuretic for now    5. CAD/ELEVATED TROPONIN------No angina or gross overload. ECHO pending    6. HYPERLIPIDEMIA----CK and TSH ok    7. OA/DJD------No NSAIDs. Uric ok    8. KETONURIA/DEHYDRATION------IVFs    9. DELIRIUM------Resolved    10. S/P FALL-----Avoid hypotension. Cardiac w/u underway. CK ok    11. DVT PROPHYLAXIS------Lovenox    12. PUD PROPHYLAXIS-----Pepcid        I discussed the patient's findings and my recommendations with patient and nursing staff    Will follow along closely. Thank you for allowing us to see this patient in renal consultation.    [facility]  [phone]  MD Clark Matos MD  11/03/23  08:09 EDT

## 2023-11-03 NOTE — PLAN OF CARE
Goal Outcome Evaluation:  Plan of Care Reviewed With: patient        Progress: no change  Outcome Evaluation: Slept at intervals. Oriented to person and place. Forgetful. IV fluids infusing. Up to BSC with Ax1. Will continue to monitor.

## 2023-11-03 NOTE — THERAPY EVALUATION
Patient Name: Rowan Guzman  : 1935    MRN: [id number]                              Today's Date: 11/3/2023       Admit Date: 2023    Visit Dx:     ICD-10-CM ICD-9-CM   1. Non-traumatic rhabdomyolysis  M62.82 728.88   2. Elevated troponin  R79.89 790.6   3. Acute renal failure, unspecified acute renal failure type  N17.9 584.9   4. Pulmonary nodule  R91.1 793.11     Patient Active Problem List   Diagnosis    Abnormal cardiovascular stress test    Acute bacterial bronchitis    Arthritis    Vaginitis    Asthma    Bradycardia    Chronic coronary artery disease    Claudication    Diabetes mellitus    Dizziness    Dyspnea on exertion    Edema    Elevated blood pressure reading without diagnosis of hypertension    Family history of diabetes mellitus    History of left heart catheterization (LHC)    Hyperlipidemia    Hypertension    Melanoma    Mitral valve prolapse    Neck pain    Paroxysmal supraventricular tachycardia    Syncope    Thymoma    PATT (acute kidney injury)    Rhabdomyolysis     Past Medical History:   Diagnosis Date    Asthma     Diabetes mellitus     type 2    Heart disease     Hyperlipidemia      Past Surgical History:   Procedure Laterality Date    ABDOMINAL SURGERY      cleaned adhesions    APPENDECTOMY      BREAST LUMPECTOMY Right     CARDIAC CATHETERIZATION  06/10/2015    COLONOSCOPY      CORONARY ANGIOPLASTY WITH STENT PLACEMENT  2017    stent to [facility]    SKIN CANCER EXCISION      melanoma removal    THYMECTOMY Left 2018    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      General Information       Row Name 23 1333          Physical Therapy Time and Intention    Document Type evaluation  -OD     Mode of Treatment physical therapy  -OD       Row Name 23 3699          General Information    Patient Profile Reviewed yes  -OD     Prior Level of Function independent:;ADL's;all household mobility  Pts family reports she has required increased  assistance for mobility and ADLs. Pt reports recent increase in falls.  -OD     Existing Precautions/Restrictions fall  -OD     Barriers to Rehab cognitive status  -OD       Row Name 11/03/23 1333          Living Environment    People in Home alone  -OD       Row Name 11/03/23 1333          Cognition    Orientation Status (Cognition) oriented x 3;disoriented to;situation  -OD       Row Name 11/03/23 1333          Safety Issues, Functional Mobility    Safety Issues Affecting Function (Mobility) ability to follow commands;awareness of need for assistance;impulsivity;judgment;insight into deficits/self-awareness  -OD     Impairments Affecting Function (Mobility) cognition;endurance/activity tolerance;pain  -OD     Cognitive Impairments, Mobility Safety/Performance awareness, need for assistance;insight into deficits/self-awareness;judgment;problem-solving/reasoning  -OD               User Key  (r) = Recorded By, (t) = Taken By, (c) = Cosigned By      Initials Name Provider Type    OD Romina Davis, PT Physical Therapist                   Mobility       Row Name 11/03/23 1552          Bed Mobility    Bed Mobility supine-sit  -OD     Supine-Sit [place] (Bed Mobility) moderate assist (50% patient effort)  -OD     Assistive Device (Bed Mobility) bed rails  -OD       Row Name 11/03/23 1552          Bed-Chair Transfer    Bed-Chair [place] (Transfers) moderate assist (50% patient effort)  -OD     Assistive Device (Bed-Chair Transfers) walker, front-wheeled  -OD     Comment, (Bed-Chair Transfer) Cues for sequencing and hand placement  -OD       Row Name 11/03/23 1552          Sit-Stand Transfer    Sit-Stand [place] (Transfers) moderate assist (50% patient effort)  -OD     Assistive Device (Sit-Stand Transfers) walker, front-wheeled  -OD       Row Name 11/03/23 1552          Gait/Stairs (Locomotion)    [place] Level (Gait) other (see comments);minimum assist (75% patient effort)  Ambulated as part of  functional transfer; reduced ability to weight shift, sequence turn to sit, and maneuver RW  -OD     Assistive Device (Gait) walker, front-wheeled  -OD     Distance in Feet (Gait) 2 feet  -OD               User Key  (r) = Recorded By, (t) = Taken By, (c) = Cosigned By      Initials Name Provider Type    Romina Zelaya PT Physical Therapist                   Obj/Interventions       Row Name 11/03/23 1553          Range of Motion Comprehensive    General Range of Motion no range of motion deficits identified  -OD       Row Name 11/03/23 1553          Strength Comprehensive (MMT)    General Manual Muscle Testing (MMT) Assessment lower extremity strength deficits identified  -OD     Comment, General Manual Muscle Testing (MMT) Assessment Pt demo weakness functionally  -OD       Row Name 11/03/23 1553          Balance    Balance Interventions sitting;minimal challenge;standing;moderate challenge;supported  -OD       Row Name 11/03/23 1553          Sensory Assessment (Somatosensory)    Sensory Assessment (Somatosensory) sensation intact  -OD               User Key  (r) = Recorded By, (t) = Taken By, (c) = Cosigned By      Initials Name Provider Type    Romina Zelaya PT Physical Therapist                   Goals/Plan       [facility] Name 11/03/23 1600          Bed Mobility Goal 1 (PT)    Activity/Assistive Device (Bed Mobility Goal 1, PT) bed mobility activities, all  -OD     [place] Level/Cues Needed (Bed Mobility Goal 1, PT) independent  -OD     Time Frame (Bed Mobility Goal 1, PT) long term goal (LTG);2 weeks  -OD       Row Name 11/03/23 1600          Transfer Goal 1 (PT)    Activity/Assistive Device (Transfer Goal 1, PT) transfers, all;walker, rolling  -OD     [place] Level/Cues Needed (Transfer Goal 1, PT) modified independence  -OD     Time Frame (Transfer Goal 1, PT) long term goal (LTG);2 weeks  -OD       Row Name 11/03/23 1600          Gait Training Goal 1 (PT)    Activity/Assistive Device (Gait  Training Goal 1, PT) gait (walking locomotion);assistive device use;walker, rolling  -OD     [place] Level (Gait Training Goal 1, PT) modified independence  -OD     Distance (Gait Training Goal 1, PT) 100 feet  -OD     Time Frame (Gait Training Goal 1, PT) long term goal (LTG);2 weeks  -OD       Row Name 11/03/23 1600          Therapy Assessment/Plan (PT)    Planned Therapy Interventions (PT) balance training;bed mobility training;gait training;home exercise program;neuromuscular re-education;postural re-education;transfer training;ROM (range of motion);patient/family education;stretching;strengthening  -OD               User Key  (r) = Recorded By, (t) = Taken By, (c) = Cosigned By      Initials Name Provider Type    OD Romina Davis, PT Physical Therapist                   Clinical Impression       Row Name 11/03/23 1551          Pain    Pretreatment Pain Rating 0/10 - no pain  -OD     Posttreatment Pain Rating 0/10 - no pain  -OD       Row Name 11/03/23 1559          Plan of Care Review    Plan of Care Reviewed With patient;son  -OD     Progress no change  -OD     Outcome Evaluation Rowan Guzman is an 89 y/o F admitted to [facility] on 11/2/23 after a fall with AMS. Imaging of head and BLE (-) for acute abnormalities. PMH includes CAD, DM, HTN, HLD, and dementia. Patient is AAOx3 this date, disoriented to situation. Patient's son present this date to assist with PLOF and family's concerns for patient's safety. At baseline, pt lives alone in [facility] and is indep with ADLs, mobility, with RW. Pt reports significant hx of falls. Pt's son reports family members take turns checking up on patient, and try to daily. Currently, pt requires modA for mobility, RW for balance, and cues for set-up and sequencing. Pt is below baseline function and would benefit from SNF upon d/c d/t worsening cognition and balance. PT to follow.  -OD       Row Name 11/03/23 1559          Therapy Assessment/Plan (PT)    Rehab Potential (PT) good, to  achieve stated therapy goals  -OD     Criteria for Skilled Interventions Met (PT) yes;meets criteria  -OD     Therapy Frequency (PT) 5 times/wk  -OD     Predicted Duration of Therapy Intervention (PT) until d/c  -OD       Row Name 11/03/23 1553          Vital Signs    O2 Delivery Pre Treatment room air  -OD     O2 Delivery Intra Treatment room air  -OD     O2 Delivery Post Treatment room air  -OD     Pre Patient Position Supine  -OD     Intra Patient Position Standing  -OD     Post Patient Position Sitting  -OD       Row Name 11/03/23 1553          Positioning and Restraints    Pre-Treatment Position in bed  -OD     Post Treatment Position chair  -OD     In Chair notified nsg;with family/caregiver;sitting;call light within reach;encouraged to call for assist;exit alarm on  -OD               User Key  (r) = Recorded By, (t) = Taken By, (c) = Cosigned By      Initials Name Provider Type    OD Romina Davis, PT Physical Therapist                   Outcome Measures       Row Name 11/03/23 1600 11/03/23 1334       How much help from another person do you currently need...    Turning from your back to your side while in flat bed without using bedrails? 4  -OD 4  -OD    Moving from lying on back to sitting on the side of a flat bed without bedrails? 3  -OD 3  -OD    Moving to and from a bed to a chair (including a wheelchair)? 3  -OD 3  -OD    Standing up from a chair using your arms (e.g., wheelchair, bedside chair)? 3  -OD 3  -OD    Climbing 3-5 steps with a railing? 1  -OD 2  -OD    To walk in hospital room? 3  -OD 3  -OD    AM-PAC 6 Clicks Score (PT) 17  -OD 18  -OD    Highest level of mobility 5 --> Static standing  -OD 6 --> Walked 10 steps or more  -OD      Row Name 11/03/23 0828          How much help from another person do you currently need...    Turning from your back to your side while in flat bed without using bedrails? 4  -RM     Moving from lying on back to sitting on the side of a flat bed without  bedrails? 4  -RM     Moving to and from a bed to a chair (including a wheelchair)? 3  -RM     Standing up from a chair using your arms (e.g., wheelchair, bedside chair)? 3  -RM     Climbing 3-5 steps with a railing? 3  -RM     To walk in hospital room? 3  -RM     AM-PAC 6 Clicks Score (PT) 20  -RM     Highest level of mobility 6 --> Walked 10 steps or more  -RM       Row Name 11/03/23 1600 11/03/23 1334       Functional Assessment    Outcome Measure Options AM-PAC 6 Clicks Basic Mobility (PT)  -OD AM-PAC 6 Clicks Basic Mobility (PT)  -OD              User Key  (r) = Recorded By, (t) = Taken By, (c) = Cosigned By      Initials Name Provider Type    RM Leyda Herrera LPN Licensed Nurse    Romina Zelaya, PT Physical Therapist                                 Physical Therapy Education       Title: PT OT SLP Therapies (Done)       Topic: Physical Therapy (Done)       Point: Mobility training (Done)       Learning Progress Summary             Patient Acceptance, E, VU by OD at 11/3/2023 1334                         Point: Home exercise program (Done)       Learning Progress Summary             Patient Acceptance, E, VU by OD at 11/3/2023 1334                         Point: Body mechanics (Done)       Learning Progress Summary             Patient Acceptance, E, VU by OD at 11/3/2023 1334                         Point: Precautions (Done)       Learning Progress Summary             Patient Acceptance, E, VU by OD at 11/3/2023 1334                                         User Key       Initials Effective Dates Name Provider Type Discipline    OD 05/11/23 -  Romina Davis, PT Physical Therapist PT                  PT Recommendation and Plan  Planned Therapy Interventions (PT): balance training, bed mobility training, gait training, home exercise program, neuromuscular re-education, postural re-education, transfer training, ROM (range of motion), patient/family education, stretching, strengthening  Plan of Care Reviewed  With: patient, son  Progress: no change  Outcome Evaluation: Rowan Guzman is an 87 y/o F admitted to [facility] on 11/2/23 after a fall with AMS. Imaging of head and BLE (-) for acute abnormalities. PMH includes CAD, DM, HTN, HLD, and dementia. Patient is AAOx3 this date, disoriented to situation. Patient's son present this date to assist with PLOF and family's concerns for patient's safety. At baseline, pt lives alone in [facility] and is indep with ADLs, mobility, with RW. Pt reports significant hx of falls. Pt's son reports family members take turns checking up on patient, and try to daily. Currently, pt requires modA for mobility, RW for balance, and cues for set-up and sequencing. Pt is below baseline function and would benefit from SNF upon d/c d/t worsening cognition and balance. PT to follow.     Time Calculation:         PT Charges       Row Name 11/03/23 1333             Time Calculation    Start Time 1100  -OD      Stop Time 1123  -OD      Time Calculation (min) 23 min  -OD      PT Received On 11/03/23  -OD      PT - Next Appointment 11/04/23  -OD      PT Goal Re-Cert Due Date 11/17/23  -OD         Time Calculation- PT    Total Timed Code Minutes- PT 0 minute(s)  -OD                User Key  (r) = Recorded By, (t) = Taken By, (c) = Cosigned By      Initials Name Provider Type    OD Romina Flowers, GLENDY Physical Therapist                  Therapy Charges for Today       Code Description Service Date Service Provider Modifiers Qty    [id number] HC PT EVAL MOD COMPLEXITY 4 11/3/2023 Romina Flowers, PT GP 1            PT G-Codes  Outcome Measure Options: AM-PAC 6 Clicks Basic Mobility (PT)  AM-PAC 6 Clicks Score (PT): 17  PT Discharge Summary  Anticipated Discharge Disposition (PT): skilled nursing facility    Romina Flowers PT  11/3/2023

## 2023-11-04 LAB
ALBUMIN SERPL-MCNC: 2.8 G/DL (ref 3.5–5.2)
ALBUMIN/GLOB SERPL: 1.1 G/DL
ALP SERPL-CCNC: 69 U/L (ref 39–117)
ALT SERPL W P-5'-P-CCNC: 6 U/L (ref 1–33)
ANION GAP SERPL CALCULATED.3IONS-SCNC: 9 MMOL/L (ref 5–15)
AST SERPL-CCNC: 11 U/L (ref 1–32)
BASOPHILS # BLD AUTO: 0.1 10*3/MM3 (ref 0–0.2)
BASOPHILS NFR BLD AUTO: 1.3 % (ref 0–1.5)
BILIRUB SERPL-MCNC: <0.2 MG/DL (ref 0–1.2)
BUN SERPL-MCNC: 21 MG/DL (ref 8–23)
BUN/CREAT SERPL: 25 (ref 7–25)
CA-I SERPL ISE-MCNC: 1.24 MMOL/L (ref 1.2–1.3)
CALCIUM SPEC-SCNC: 8.8 MG/DL (ref 8.6–10.5)
CHLORIDE SERPL-SCNC: 110 MMOL/L (ref 98–107)
CK SERPL-CCNC: 136 U/L (ref 20–180)
CO2 SERPL-SCNC: 23 MMOL/L (ref 22–29)
CREAT SERPL-MCNC: 0.84 MG/DL (ref 0.57–1)
DEPRECATED RDW RBC AUTO: 49.9 FL (ref 37–54)
EGFRCR SERPLBLD CKD-EPI 2021: 67.8 ML/MIN/1.73
EOSINOPHIL # BLD AUTO: 0.2 10*3/MM3 (ref 0–0.4)
EOSINOPHIL NFR BLD AUTO: 2.8 % (ref 0.3–6.2)
ERYTHROCYTE [DISTWIDTH] IN BLOOD BY AUTOMATED COUNT: 14.5 % (ref 12.3–15.4)
GLOBULIN UR ELPH-MCNC: 2.5 GM/DL
GLUCOSE SERPL-MCNC: 136 MG/DL (ref 65–99)
HCT VFR BLD AUTO: 35 % (ref 34–46.6)
HGB BLD-MCNC: 11.7 G/DL (ref 12–15.9)
LYMPHOCYTES # BLD AUTO: 2 10*3/MM3 (ref 0.7–3.1)
LYMPHOCYTES NFR BLD AUTO: 26.5 % (ref 19.6–45.3)
MAGNESIUM SERPL-MCNC: 2 MG/DL (ref 1.6–2.4)
MCH RBC QN AUTO: 30.9 PG (ref 26.6–33)
MCHC RBC AUTO-ENTMCNC: 33.6 G/DL (ref 31.5–35.7)
MCV RBC AUTO: 92.1 FL (ref 79–97)
MONOCYTES # BLD AUTO: 0.8 10*3/MM3 (ref 0.1–0.9)
MONOCYTES NFR BLD AUTO: 10.2 % (ref 5–12)
NEUTROPHILS NFR BLD AUTO: 4.4 10*3/MM3 (ref 1.7–7)
NEUTROPHILS NFR BLD AUTO: 59.2 % (ref 42.7–76)
NRBC BLD AUTO-RTO: 0 /100 WBC (ref 0–0.2)
PHOSPHATE SERPL-MCNC: 3.5 MG/DL (ref 2.5–4.5)
PLATELET # BLD AUTO: 458 10*3/MM3 (ref 140–450)
PMV BLD AUTO: 6.8 FL (ref 6–12)
POTASSIUM SERPL-SCNC: 3.8 MMOL/L (ref 3.5–5.2)
PROT SERPL-MCNC: 5.3 G/DL (ref 6–8.5)
RBC # BLD AUTO: 3.8 10*6/MM3 (ref 3.77–5.28)
SODIUM SERPL-SCNC: 142 MMOL/L (ref 136–145)
WBC NRBC COR # BLD: 7.4 10*3/MM3 (ref 3.4–10.8)

## 2023-11-04 PROCEDURE — 94799 UNLISTED PULMONARY SVC/PX: CPT

## 2023-11-04 PROCEDURE — 25010000002 CEFTRIAXONE PER 250 MG: Performed by: FAMILY MEDICINE

## 2023-11-04 PROCEDURE — 94761 N-INVAS EAR/PLS OXIMETRY MLT: CPT

## 2023-11-04 PROCEDURE — 25810000003 SODIUM CHLORIDE 0.9 % SOLUTION: Performed by: FAMILY MEDICINE

## 2023-11-04 PROCEDURE — 25010000002 ENOXAPARIN PER 10 MG: Performed by: FAMILY MEDICINE

## 2023-11-04 PROCEDURE — 80053 COMPREHEN METABOLIC PANEL: CPT | Performed by: FAMILY MEDICINE

## 2023-11-04 PROCEDURE — 82330 ASSAY OF CALCIUM: CPT | Performed by: INTERNAL MEDICINE

## 2023-11-04 PROCEDURE — 85025 COMPLETE CBC W/AUTO DIFF WBC: CPT | Performed by: FAMILY MEDICINE

## 2023-11-04 PROCEDURE — 94664 DEMO&/EVAL PT USE INHALER: CPT

## 2023-11-04 PROCEDURE — 84100 ASSAY OF PHOSPHORUS: CPT | Performed by: FAMILY MEDICINE

## 2023-11-04 PROCEDURE — 82550 ASSAY OF CK (CPK): CPT | Performed by: FAMILY MEDICINE

## 2023-11-04 PROCEDURE — 83735 ASSAY OF MAGNESIUM: CPT | Performed by: FAMILY MEDICINE

## 2023-11-04 RX ADMIN — Medication 10 ML: at 20:43

## 2023-11-04 RX ADMIN — IPRATROPIUM BROMIDE AND ALBUTEROL SULFATE 3 ML: .5; 3 SOLUTION RESPIRATORY (INHALATION) at 07:53

## 2023-11-04 RX ADMIN — MONTELUKAST 10 MG: 10 TABLET, FILM COATED ORAL at 20:43

## 2023-11-04 RX ADMIN — ISOSORBIDE MONONITRATE 30 MG: 30 TABLET, EXTENDED RELEASE ORAL at 10:02

## 2023-11-04 RX ADMIN — ASPIRIN 81 MG: 81 TABLET, COATED ORAL at 10:02

## 2023-11-04 RX ADMIN — METOPROLOL SUCCINATE 25 MG: 25 TABLET, EXTENDED RELEASE ORAL at 10:02

## 2023-11-04 RX ADMIN — Medication 10 ML: at 10:02

## 2023-11-04 RX ADMIN — GABAPENTIN 300 MG: 300 CAPSULE ORAL at 20:43

## 2023-11-04 RX ADMIN — IPRATROPIUM BROMIDE AND ALBUTEROL SULFATE 3 ML: .5; 3 SOLUTION RESPIRATORY (INHALATION) at 11:08

## 2023-11-04 RX ADMIN — BUDESONIDE 0.5 MG: 0.5 INHALANT RESPIRATORY (INHALATION) at 07:57

## 2023-11-04 RX ADMIN — SODIUM CHLORIDE 75 ML/HR: 9 INJECTION, SOLUTION INTRAVENOUS at 02:11

## 2023-11-04 RX ADMIN — ENOXAPARIN SODIUM 40 MG: 40 INJECTION, SOLUTION SUBCUTANEOUS at 15:23

## 2023-11-04 RX ADMIN — ACETAMINOPHEN 650 MG: 325 TABLET, FILM COATED ORAL at 13:00

## 2023-11-04 RX ADMIN — CEFTRIAXONE 1000 MG: 1 INJECTION, POWDER, FOR SOLUTION INTRAMUSCULAR; INTRAVENOUS at 13:02

## 2023-11-04 RX ADMIN — CETIRIZINE HYDROCHLORIDE 10 MG: 10 TABLET, FILM COATED ORAL at 10:02

## 2023-11-04 NOTE — PLAN OF CARE
Goal Outcome Evaluation:              Outcome Evaluation: Pt had IVF infusing currently, IV abt given. Continuing to monitor.

## 2023-11-04 NOTE — PLAN OF CARE
Goal Outcome Evaluation:  Plan of Care Reviewed With: patient        Progress: no change       Patient has been pleasantly confused and was recently changed to DNR/DNI this shift. Patient has been resting with no issues or concerns noted at this time.

## 2023-11-04 NOTE — PROGRESS NOTES
"DATE/TIME OF ADMISSION:  11/2/2023  3:25 PM     LOS: 1 day   Patient Care Team:  Willa Martinez MD as PCP - General  Willa Martinez MD as PCP - Family Medicine  Amor López MD as Consulting Physician (Cardiology)  Teddy Francis MD as Consulting Physician (Nephrology)  Consults       Date and Time Order Name Status Description    11/3/2023  5:30 AM Inpatient Nephrology Consult Completed     11/3/2023  4:22 AM Inpatient Cardiology Consult              Subjective BLOOD SUGARS ALL GOOD AND BLOOD GLUCOSE MONITORING STOPPED AS WELL AS NO NEED FOR SLIDING SCALE INSULIN  ON ROCEPHIN AND TOLERATING IT FOR POSSIBLE UTI---CULTURES PENDING  COGNITIVE STATUS STABLE AT HER CONFUSION BASELINE  PT/OT CONTINUING WHILE HERE  PLAN IS FOR SKILLED INPT REHAB AND ULTIMATELY ASSISTED LIVING    Interval History: NO FEVER, STABLE    Patient Complaints: NONE    History taken from: patient    Review of Systems        Objective     Vital Signs  /71 (BP Location: Left arm, Patient Position: Lying)   Pulse 87   Temp 98.1 °F (36.7 °C) (Oral)   Resp 19   Ht 162.6 cm (64\")   Wt 70.3 kg (155 lb)   SpO2 98%   BMI 26.61 kg/m²     Physical Exam:         General Appearance:    Alert, cooperative, in no acute distress; CONFUSED AND O X 1   Head:    Normocephalic, without obvious abnormality, atraumatic   Eyes:            Lids and lashes normal, conjunctivae and sclerae normal, no   icterus, no pallor, corneas clear, PERRLA   Ears:    Ears appear intact with no abnormalities noted   Throat:   No oral lesions, no thrush, oral mucosa moist   Neck:   No adenopathy, supple, trachea midline, no thyromegaly, no   carotid bruit, no JVD   Lungs:     Clear to auscultation,respirations regular, even and                  unlabored    Heart:    Regular rhythm and normal rate, normal S1 and S2, no            murmur, no gallop, no rub, no click   Chest Wall:    No abnormalities observed   Abdomen:     Normal bowel sounds, no " masses, no organomegaly, soft        non-tender, non-distended, no guarding, no rebound                tenderness   Extremities:   Moves all extremities well, 1+ edema IN THE LOWER EXTREMITIES, no cyanosis, no             redness; NO CALF TENDERNESS   Pulses:   Pulses palpable and equal bilaterally   Skin:   No bleeding, bruising or rash   Lymph nodes:   No palpable adenopathy   Neurologic:   alert and O x 1        I HAVE PERSONALLY EXAMINED AND REVIEWED THE PATIENT'S RECORD     Lab Results (last 48 hours)       Procedure Component Value Units Date/Time    CK [[id number]]  (Normal) Collected: 11/04/23 0412    Specimen: Blood Updated: 11/04/23 0455     Creatine Kinase 136 U/L     Comprehensive Metabolic Panel [[id number]]  (Abnormal) Collected: 11/04/23 0412    Specimen: Blood Updated: 11/04/23 0455     Glucose 136 mg/dL      BUN 21 mg/dL      Creatinine 0.84 mg/dL      Sodium 142 mmol/L      Potassium 3.8 mmol/L      Chloride 110 mmol/L      CO2 23.0 mmol/L      Calcium 8.8 mg/dL      Total Protein 5.3 g/dL      Albumin 2.8 g/dL      ALT (SGPT) 6 U/L      AST (SGOT) 11 U/L      Alkaline Phosphatase 69 U/L      Total Bilirubin <0.2 mg/dL      Globulin 2.5 gm/dL      A/G Ratio 1.1 g/dL      BUN/Creatinine Ratio 25.0     Anion Gap 9.0 mmol/L      eGFR 67.8 mL/min/1.73     Narrative:      GFR Normal >60  Chronic Kidney Disease <60  Kidney Failure <15    The GFR formula is only valid for adults with stable renal function between ages 18 and 70.    Magnesium [[id number]]  (Normal) Collected: 11/04/23 0412    Specimen: Blood Updated: 11/04/23 0455     Magnesium 2.0 mg/dL     Phosphorus [[id number]]  (Normal) Collected: 11/04/23 0412    Specimen: Blood Updated: 11/04/23 0455     Phosphorus 3.5 mg/dL     Calcium, Ionized [[id number]]  (Normal) Collected: 11/04/23 0412    Specimen: Blood Updated: 11/04/23 0442     Ionized Calcium 1.24 mmol/L     CBC Auto Differential [[id number]]  (Abnormal) Collected: 11/04/23 0412    Specimen:  Blood Updated: 11/04/23 0432     WBC 7.40 10*3/mm3      RBC 3.80 10*6/mm3      Hemoglobin 11.7 g/dL      Hematocrit 35.0 %      MCV 92.1 fL      MCH 30.9 pg      MCHC 33.6 g/dL      RDW 14.5 %      RDW-SD 49.9 fl      MPV 6.8 fL      Platelets 458 10*3/mm3      Neutrophil % 59.2 %      Lymphocyte % 26.5 %      Monocyte % 10.2 %      Eosinophil % 2.8 %      Basophil % 1.3 %      Neutrophils, Absolute 4.40 10*3/mm3      Lymphocytes, Absolute 2.00 10*3/mm3      Monocytes, Absolute 0.80 10*3/mm3      Eosinophils, Absolute 0.20 10*3/mm3      Basophils, Absolute 0.10 10*3/mm3      nRBC 0.0 /100 WBC     POC Glucose Once [[id number]]  (Abnormal) Collected: 11/03/23 1657    Specimen: Blood Updated: 11/03/23 1700     Glucose 168 mg/dL      Comment: Serial Number: [id number]:  [id number]       Sodium, Urine, Random - Urine, Clean Catch [[id number]] Collected: 11/03/23 1042    Specimen: Urine, Clean Catch Updated: 11/03/23 1425     Sodium, Urine 65 mmol/L     Narrative:      Reference intervals for random urine have not been established.  Clinical usage is dependent upon physician's interpretation in combination with other laboratory tests.       Eosinophil Smear - Urine, Urine, Clean Catch [[id number]]  (Normal) Collected: 11/03/23 1042    Specimen: Urine, Clean Catch Updated: 11/03/23 1218     Eosinophil Smear 0 % EOS/100 Cells     POC Glucose Once [[id number]]  (Abnormal) Collected: 11/03/23 1215    Specimen: Blood Updated: 11/03/23 1216     Glucose 133 mg/dL      Comment: Serial Number: [id number]:  [id number]       POC Glucose Once [[id number]]  (Normal) Collected: 11/03/23 1117    Specimen: Blood Updated: 11/03/23 1118     Glucose 94 mg/dL      Comment: Serial Number: [id number]:  [id number]       Urinalysis, Microscopic Only - Urine, Clean Catch [[id number]]  (Abnormal) Collected: 11/03/23 1042    Specimen: Urine, Clean Catch Updated: 11/03/23 1055     RBC, UA 0-2 /HPF      WBC, UA 11-20 /HPF      Bacteria,  UA 4+ /HPF      Squamous Epithelial Cells, UA 0-2 /HPF      Hyaline Casts, UA 0-2 /LPF      Methodology Automated Microscopy    Urinalysis With Culture If Indicated - Urine, Clean Catch [[id number]]  (Abnormal) Collected: 11/03/23 1042    Specimen: Urine, Clean Catch Updated: 11/03/23 1055     Color, UA Yellow     Appearance, UA Clear     pH, UA 5.5     Specific Gravity, UA 1.013     Glucose, UA Negative     Ketones, UA Negative     Bilirubin, UA Negative     Blood, UA Negative     Protein, UA Negative     Leuk Esterase, UA Negative     Nitrite, UA Positive     Urobilinogen, UA 0.2 E.U./dL    Narrative:      In absence of clinical symptoms, the presence of pyuria, bacteria, and/or nitrites on the urinalysis result does not correlate with infection.    Urine Culture - Urine, Urine, Clean Catch [[id number]] Collected: 11/03/23 1042    Specimen: Urine, Clean Catch Updated: 11/03/23 1055    Uric Acid [[id number]]  (Abnormal) Collected: 11/03/23 0626    Specimen: Blood Updated: 11/03/23 1002     Uric Acid 6.1 mg/dL     Hemoglobin A1c [[id number]]  (Abnormal) Collected: 11/03/23 0626    Specimen: Blood Updated: 11/03/23 0941     Hemoglobin A1C 5.80 %     TSH [[id number]]  (Normal) Collected: 11/03/23 0626    Specimen: Blood Updated: 11/03/23 0841     TSH 1.360 uIU/mL     Comprehensive Metabolic Panel [[id number]]  (Abnormal) Collected: 11/03/23 0626    Specimen: Blood Updated: 11/03/23 0748     Glucose 90 mg/dL      BUN 24 mg/dL      Creatinine 0.91 mg/dL      Sodium 141 mmol/L      Potassium 3.8 mmol/L      Comment: Slight hemolysis detected by analyzer. Results may be affected.        Chloride 109 mmol/L      CO2 22.0 mmol/L      Calcium 8.0 mg/dL      Total Protein 5.6 g/dL      Albumin 2.9 g/dL      ALT (SGPT) 14 U/L      AST (SGOT) 19 U/L      Alkaline Phosphatase 74 U/L      Total Bilirubin 0.4 mg/dL      Globulin 2.7 gm/dL      A/G Ratio 1.1 g/dL      BUN/Creatinine Ratio 26.4     Anion Gap 10.0 mmol/L      eGFR 61.6  mL/min/1.73     Narrative:      GFR Normal >60  Chronic Kidney Disease <60  Kidney Failure <15    The GFR formula is only valid for adults with stable renal function between ages 18 and 70.    High Sensitivity Troponin T [[id number]]  (Abnormal) Collected: 11/03/23 0626    Specimen: Blood Updated: 11/03/23 0747     HS Troponin T 160 ng/L     Narrative:      High Sensitive Troponin T Reference Range:  <10.0 ng/L- Negative Female for AMI  <15.0 ng/L- Negative Male for AMI  >=10 - Abnormal Female indicating possible myocardial injury.  >=15 - Abnormal Male indicating possible myocardial injury.   Clinicians would have to utilize clinical acumen, EKG, Troponin, and serial changes to determine if it is an Acute Myocardial Infarction or myocardial injury due to an underlying chronic condition.         Magnesium [[id number]]  (Normal) Collected: 11/03/23 0626    Specimen: Blood Updated: 11/03/23 0739     Magnesium 1.9 mg/dL     CK [[id number]]  (Abnormal) Collected: 11/03/23 0626    Specimen: Blood Updated: 11/03/23 0736     Creatine Kinase 261 U/L     Lipid Panel [[id number]]  (Abnormal) Collected: 11/03/23 0626    Specimen: Blood Updated: 11/03/23 0736     Total Cholesterol 191 mg/dL      Triglycerides 67 mg/dL      HDL Cholesterol 63 mg/dL      LDL Cholesterol  116 mg/dL      VLDL Cholesterol 12 mg/dL      LDL/HDL Ratio 1.82    Narrative:      Cholesterol Reference Ranges  ([place] Department of Health and Human Services ATP III Classifications)    Desirable          <200 mg/dL  Borderline High    200-239 mg/dL  High Risk          >240 mg/dL      Triglyceride Reference Ranges  (U.S. Department of Health and Human Services ATP III Classifications)    Normal           <150 mg/dL  Borderline High  150-199 mg/dL  High             200-499 mg/dL  Very High        >500 mg/dL    HDL Reference Ranges  (U.S. Department of Health and Human Services ATP III Classifications)    Low     <40 mg/dl (major risk factor for CHD)  High    >60  mg/dl ('negative' risk factor for CHD)        LDL Reference Ranges  (U.S. Department of Health and Human Services ATP III Classifications)    Optimal          <100 mg/dL  Near Optimal     100-129 mg/dL  Borderline High  130-159 mg/dL  High             160-189 mg/dL  Very High        >189 mg/dL    Phosphorus [[id number]]  (Normal) Collected: 11/03/23 0626    Specimen: Blood Updated: 11/03/23 0736     Phosphorus 3.5 mg/dL     POC Glucose Once [[id number]]  (Normal) Collected: 11/03/23 0720    Specimen: Blood Updated: 11/03/23 0722     Glucose 83 mg/dL      Comment: Serial Number: [id number]:  [id number]       CBC (No Diff) [[id number]]  (Abnormal) Collected: 11/03/23 0626    Specimen: Blood Updated: 11/03/23 0712     WBC 11.40 10*3/mm3      RBC 3.85 10*6/mm3      Hemoglobin 11.9 g/dL      Hematocrit 35.3 %      MCV 91.6 fL      MCH 30.8 pg      MCHC 33.6 g/dL      RDW 14.2 %      RDW-SD 48.1 fl      MPV 7.0 fL      Platelets 460 10*3/mm3     POC Glucose Once [[id number]]  (Abnormal) Collected: 11/03/23 0109    Specimen: Blood Updated: 11/03/23 0111     Glucose 107 mg/dL      Comment: Serial Number: [id number]:  [id number]       Urinalysis without microscopic (no culture) - Urine, Clean Catch [[id number]]  (Abnormal) Collected: 11/02/23 2055    Specimen: Urine, Clean Catch Updated: 11/02/23 2059     Color, UA Dark Yellow     Appearance, UA Slightly Cloudy     pH, UA <=5.0     Specific Gravity, UA 1.020     Glucose, UA Negative     Ketones, UA 15 mg/dL (1+)     Bilirubin, UA Moderate (2+)     Blood, UA Trace     Protein, UA 30 mg/dL (1+)     Leuk Esterase, UA Small (1+)     Nitrite, UA Negative     Urobilinogen, UA 0.2 E.U./dL    CK [[id number]]  (Abnormal) Collected: 11/02/23 1550    Specimen: Blood Updated: 11/02/23 1847     Creatine Kinase 422 U/L     High Sensitivity Troponin T 2Hr [[id number]]  (Abnormal) Collected: 11/02/23 1806    Specimen: Blood from Arm, Left Updated: 11/02/23 1840     HS Troponin T 245  ng/L      Troponin T Delta -93 ng/L     Narrative:      High Sensitive Troponin T Reference Range:  <10.0 ng/L- Negative Female for AMI  <15.0 ng/L- Negative Male for AMI  >=10 - Abnormal Female indicating possible myocardial injury.  >=15 - Abnormal Male indicating possible myocardial injury.   Clinicians would have to utilize clinical acumen, EKG, Troponin, and serial changes to determine if it is an Acute Myocardial Infarction or myocardial injury due to an underlying chronic condition.         CBC & Differential [[id number]]  (Abnormal) Collected: 11/02/23 1550    Specimen: Blood Updated: 11/02/23 1623    Narrative:      The following orders were created for panel order CBC & Differential.  Procedure                               Abnormality         Status                     ---------                               -----------         ------                     CBC Auto Differential[[id number]]        Abnormal            Final result                 Please view results for these tests on the individual orders.    CBC Auto Differential [[id number]]  (Abnormal) Collected: 11/02/23 1550    Specimen: Blood Updated: 11/02/23 1623     WBC 12.47 10*3/mm3      RBC 4.33 10*6/mm3      Hemoglobin 13.1 g/dL      Hematocrit 40.5 %      MCV 93.5 fL      MCH 30.3 pg      MCHC 32.3 g/dL      RDW 13.6 %      RDW-SD 47.1 fl      MPV 8.7 fL      Platelets 529 10*3/mm3      Neutrophil % 74.5 %      Lymphocyte % 15.0 %      Monocyte % 9.5 %      Eosinophil % 0.3 %      Basophil % 0.2 %      Immature Grans % 0.5 %      Neutrophils, Absolute 9.28 10*3/mm3      Lymphocytes, Absolute 1.87 10*3/mm3      Monocytes, Absolute 1.19 10*3/mm3      Eosinophils, Absolute 0.04 10*3/mm3      Basophils, Absolute 0.03 10*3/mm3      Immature Grans, Absolute 0.06 10*3/mm3     Comprehensive Metabolic Panel [[id number]]  (Abnormal) Collected: 11/02/23 1550    Specimen: Blood Updated: 11/02/23 1622     Glucose 142 mg/dL      BUN 26 mg/dL      Creatinine 1.97  mg/dL      Sodium 134 mmol/L      Potassium 3.8 mmol/L      Chloride 99 mmol/L      CO2 24.8 mmol/L      Calcium 9.2 mg/dL      Total Protein 6.3 g/dL      Albumin 3.8 g/dL      ALT (SGPT) 18 U/L      AST (SGOT) 25 U/L      Alkaline Phosphatase 59 U/L      Total Bilirubin 0.6 mg/dL      Globulin 2.5 gm/dL      A/G Ratio 1.5 g/dL      BUN/Creatinine Ratio 13.2     Anion Gap 10.2 mmol/L      eGFR 24.4 mL/min/1.73     Narrative:      GFR Normal >60  Chronic Kidney Disease <60  Kidney Failure <15    The GFR formula is only valid for adults with stable renal function between ages 18 and 70.    Magnesium [[id number]]  (Normal) Collected: 11/02/23 1550    Specimen: Blood Updated: 11/02/23 1622     Magnesium 2.0 mg/dL     High Sensitivity Troponin T [[id number]]  (Abnormal) Collected: 11/02/23 1550    Specimen: Blood Updated: 11/02/23 1618     HS Troponin T 338 ng/L     Narrative:      High Sensitive Troponin T Reference Range:  <10.0 ng/L- Negative Female for AMI  <15.0 ng/L- Negative Male for AMI  >=10 - Abnormal Female indicating possible myocardial injury.  >=15 - Abnormal Male indicating possible myocardial injury.   Clinicians would have to utilize clinical acumen, EKG, Troponin, and serial changes to determine if it is an Acute Myocardial Infarction or myocardial injury due to an underlying chronic condition.         Lactic Acid, Plasma [[id number]]  (Normal) Collected: 11/02/23 1550    Specimen: Blood Updated: 11/02/23 1616     Lactate 1.6 mmol/L              Imaging Results (Last 48 Hours)       Procedure Component Value Units Date/Time    US Renal Bilateral [[id number]] Collected: 11/03/23 0944     Updated: 11/03/23 0947    Narrative:      US RENAL BILATERAL    Date of Exam: 11/3/2023 8:55 AM EDT    Indication: ARF/PATT/CRF/CKD.    Comparison: No comparisons available.    Technique: Grayscale and color Doppler ultrasound evaluation of the kidneys and urinary bladder was performed.      Findings:  The right kidney  measures 9.35 in length by 4.63 transversely and the left measures 9.42 cm in length by 5.08 cm transversely. There is no hydronephrosis and there are no solid masses. There is a small left cortical cyst measuring 9 x 9 x 10 mm.      Impression:      Impression:  No obstruction. Small simple cyst in the left kidney noted.    Electronically Signed: Leyda Joseph MD    11/3/2023 9:45 AM EDT    Workstation ID: [id number]    CT Head Without Contrast [[id number]] Collected: 11/02/23 1652     Updated: 11/02/23 1700    Narrative:      CT HEAD WO CONTRAST, CT CERVICAL SPINE WO CONTRAST    Date of Exam: 11/2/2023 3:28 PM CDT    Indication: Head trauma, minor (Age >= 65y)  Head trauma, moderate-severe.    Comparison: None available.    Technique: Axial CT images were obtained of the head and cervical spine without contrast administration.  Coronal in sagittal reconstructions were performed.  Automated exposure control and iterative reconstruction methods were used.      Findings:  CT HEAD:  There is no evidence of acute infarction.    There there is no acute intracranial hemorrhage. There are no extra-axial collections.    Ventricles and CSF spaces are symmetrically prominent. No mass effect nor hydrocephalus.    Moderate nonspecific white matter hypoattenuation suggestive of microvascular scheming disease without mass effect. 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     Paranasal sinuses and mastoid air cells are adequately aerated.     Osseous structures and orbits appear intact.        CT CERVICAL SPINE:  OSSEOUS STRUCTURES: No fracture nor bony destructive process.    ALIGNMENT: There is grade 1 spondylolisthesis at C4/5.    DISC SPACE: Moderate lower cervical spondylosis.    JOINTS: Moderate mid cervical facet arthropathy.    SOFT TISSUES:  Unremarkable    LUNG APICES: Unremarkable    LEVELS: There is moderate osseous left neuroforaminal stenosis at C4/5.      Impression:       Impression:  1.No acute traumatic injury within the head nor cervical spine identified.  2.Age-related changes as detailed above.      Electronically Signed: Kash Faria MD    11/2/2023 3:58 PM CDT    Workstation ID: [id number]    CT Cervical Spine Without Contrast [[id number]] Collected: 11/02/23 1652     Updated: 11/02/23 1700    Narrative:      CT HEAD WO CONTRAST, CT CERVICAL SPINE WO CONTRAST    Date of Exam: 11/2/2023 3:28 PM CDT    Indication: Head trauma, minor (Age >= 65y)  Head trauma, moderate-severe.    Comparison: None available.    Technique: Axial CT images were obtained of the head and cervical spine without contrast administration.  Coronal in sagittal reconstructions were performed.  Automated exposure control and iterative reconstruction methods were used.      Findings:  CT HEAD:  There is no evidence of acute infarction.    There there is no acute intracranial hemorrhage. There are no extra-axial collections.    Ventricles and CSF spaces are symmetrically prominent. No mass effect nor hydrocephalus.    Moderate nonspecific white matter hypoattenuation suggestive of microvascular scheming disease without mass effect. 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     Paranasal sinuses and mastoid air cells are adequately aerated.     Osseous structures and orbits appear intact.        CT CERVICAL SPINE:  OSSEOUS STRUCTURES: No fracture nor bony destructive process.    ALIGNMENT: There is grade 1 spondylolisthesis at C4/5.    DISC SPACE: Moderate lower cervical spondylosis.    JOINTS: Moderate mid cervical facet arthropathy.    SOFT TISSUES:  Unremarkable    LUNG APICES: Unremarkable    LEVELS: There is moderate osseous left neuroforaminal stenosis at C4/5.      Impression:      Impression:  1.No acute traumatic injury within the head nor cervical spine identified.  2.Age-related changes as detailed above.      Electronically Signed: Kash Faria MD    11/2/2023 3:58 PM CDT     Workstation ID: [id number]    XR Chest 2 View [[id number]] Collected: 11/02/23 1654     Updated: 11/02/23 1657    Narrative:      XR CHEST 2 VW    Date of Exam: 11/2/2023 3:29 PM CDT    Indication: fall    Comparison: CT chest 5/27/2021    Findings:  The heart is normal in size. The lungs are hyperinflated. No evidence for pneumothorax or pleural effusion. There is a nodule at the right lung base measuring 7 mm. No acute osseous injury noted.      Impression:      Impression:  7 mm nodule at the right lung base. A follow-up noncontrast CT thorax can be obtained for evaluation.      Electronically Signed: Karyn Faria MD    11/2/2023 3:55 PM CDT    Workstation ID: [id number]    XR Hip With or Without Pelvis 2 - 3 View Left [[id number]] Collected: 11/02/23 1652     Updated: 11/02/23 1657    Narrative:      XR HIP W OR WO PELVIS 2-3 VIEW LEFT    Date of Exam: 11/2/2023 4:29 PM EDT    Indication: fall    Comparison: None available.    Findings:  No definite acute fracture or dislocation. Please note that evaluation of the left pelvic rim is mildly degraded due to patient positioning.    Extensive degenerative changes of the left hip with deformity of the left femoral head with associated adjacent sclerosis.  Mild degenerative changes of the right hip.    No suspicious soft tissue findings. Mild stool burden in the ascending colon      Impression:      Impression:  No definite acute fracture or dislocation.    Extensive degenerative changes with chronic appearing deformity of the left femoral head. Underlying AVN is not excluded.      Electronically Signed: Johnny Ventura DO    11/2/2023 4:55 PM EDT    Workstation ID: [id number]    XR Knee 3 View Left [[id number]] Collected: 11/02/23 1652     Updated: 11/02/23 1656    Narrative:      XR KNEE 3 VW RIGHT, XR KNEE 3 VW LEFT    Date of Exam: 11/2/2023 3:29 PM CDT    Indication: fall    Comparison: None available.    Findings:  Evaluation of the right knee shows no evidence  for acute fracture or acute alignment abnormality. There is advanced lateral compartment joint space narrowing with bone-on-bone changes and osteophyte formation. Patellofemoral joint space narrowing is   noted. There is a suprapatellar joint effusion.    Evaluation of the left knee shows no evidence for acute fracture or acute alignment abnormality. Patellofemoral joint space narrowing is noted. No joint effusion.      Impression:      Impression:  No evidence for acute osseous injury to the right or left knee.    Advanced right knee lateral compartment degenerative arthrosis and joint effusion.      Electronically Signed: Karyn Faria MD    11/2/2023 3:54 PM CDT    Workstation ID: [id number]    XR Knee 3 View Right [[id number]] Collected: 11/02/23 1652     Updated: 11/02/23 1656    Narrative:      XR KNEE 3 VW RIGHT, XR KNEE 3 VW LEFT    Date of Exam: 11/2/2023 3:29 PM CDT    Indication: fall    Comparison: None available.    Findings:  Evaluation of the right knee shows no evidence for acute fracture or acute alignment abnormality. There is advanced lateral compartment joint space narrowing with bone-on-bone changes and osteophyte formation. Patellofemoral joint space narrowing is   noted. There is a suprapatellar joint effusion.    Evaluation of the left knee shows no evidence for acute fracture or acute alignment abnormality. Patellofemoral joint space narrowing is noted. No joint effusion.      Impression:      Impression:  No evidence for acute osseous injury to the right or left knee.    Advanced right knee lateral compartment degenerative arthrosis and joint effusion.      Electronically Signed: Karyn Faria MD    11/2/2023 3:54 PM CDT    Workstation ID: [id number]    XR Elbow 3+ View Left [[id number]] Collected: 11/02/23 1651     Updated: 11/02/23 1654    Narrative:      XR ELBOW 3+ VW LEFT    Date of Exam: 11/2/2023 4:29 PM EDT    Indication: fall    Comparison: None available.    Findings:  There is no  evidence of fracture or dislocation.  No focal lesions identified. No erosions.    Mild degenerative changes.    No focal soft tissue abnormalities identified.      Impression:      Impression:  No acute abnormality.      Electronically Signed: Johnny Ventura DO    11/2/2023 4:52 PM EDT    Workstation ID: [id number]                 I reviewed the patient's new clinical results.    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         Medication Review:   Scheduled Meds:aspirin, 81 mg, Oral, Daily  budesonide, 0.5 mg, Nebulization, BID - RT  cefTRIAXone, 1,000 mg, Intravenous, Q24H  cetirizine, 10 mg, Oral, Daily  enoxaparin, 40 mg, Subcutaneous, Q24H  gabapentin, 300 mg, Oral, Nightly  ipratropium-albuterol, 3 mL, Nebulization, TID - RT  isosorbide mononitrate, 30 mg, Oral, Daily  metoprolol succinate XL, 25 mg, Oral, Daily  montelukast, 10 mg, Oral, Nightly  sodium chloride, 10 mL, Intravenous, Q12H      Continuous Infusions:Pharmacy to Dose enoxaparin (LOVENOX),   sodium chloride, 75 mL/hr, Last Rate: 75 mL/hr (11/04/23 0211)      PRN Meds:.  acetaminophen    aluminum-magnesium hydroxide-simethicone    calcium carbonate    dextrose    dextrose    diphenhydrAMINE    glucagon (human recombinant)    nitroglycerin    ondansetron **OR** ondansetron    Pharmacy to Dose enoxaparin (LOVENOX)    [COMPLETED] Insert peripheral IV **AND** sodium chloride    sodium chloride    sodium chloride    sodium chloride    sodium chloride      Assessment & Plan   Fall----PT/OT; SKILLED INPT REHAB UPON DISCHARGE HOPEFULLY MONDAY AM PENDING BED AVAILABILITY; HOPING FOR SILVER CREST  Altered mental status  Obtain MRI of the head---CANCELED MRI AS SHE IS AT HER BASELINE FOR DEMENTIA AND CT NOT REMARKABLE EXCEPT FOR AGE RELATED CHANGE     Acute kidney injury  Creatinine 1.97 ON ADMISSION  Start normal saline 125 cc an hour---WITH SWELLING REDUCED TO 75 CC/HR  Repeat BMP in the morning----CREAT IMPROVED; RENAL ULTRASOUND NORMAL (SMALL BENIGN CYST); RESOLVED     Rhabdomyolysis  Continue with IV fluids  Repeat CK level in the morning----IMPROVED QUICKLY WITH HYDRATION; RESOLVED WITH QUICK INTERVENTION OF A CARING NEIGHBOR WHO ALERTED HER SON TO CHECK ON HER WHEN DOWN     Coronary artery disease  Elevated troponin  Trend troponin  EKG shows normal sinus rhythm  Patient denies chest pain  Consult cardiology------------------ECHO WITH GOOD EF%; MITRAL VALVE CALCIFICAITON SEVERE BUT NO CHANGES  NO INTERVENTION AT ALL PLANNED; CARDIOLOGIST AGREES     Diabetes  We will add sliding scale insulin and Accu-Cheks---EXCELLENT HGB A1C WITH NO MEDS  IF BLOOD GLUCOSE STABLE AND I  DISCONTINUED ACCUCHECKS AND SS INSULIN     Hypertension  Blood pressure stable  Resume home meds---STABLE     Hyperlipidemia  Resume statin----WE STOPPED THE STATIN ABOUT A YEAR AGO ; LIPIDS GOOD AND WE ELECTED TO LESSEN THE MEDS SHE IS ON WITH HER CURRENT DECLINING COGNITIVE STATE     DVT prophylaxis:  Medical DVT prophylaxis orders are present.     CODE STATUS:  WILL ADDRESS WITH FAMILY YESTERDAY---DISCUSSED IN DETAIL AND ALL 3 ADULT KIDS AGREE WITH DNR/DNI/COMFORT STATUS; WILL DO AN OUT OF THE HOSPITAL ORDER AS WELL PRIOR TO TRANSFER TO REHAB     UTI---CULTURES POSITIVE ; IV ROCEPHIN ; TOLERATING THIS FINE (HX OF PCN ALLERGY); WILL SWITCH TO ORAL ANTIBIOTICS ON MONDAY TO COMPLETE A 10 DAY COURSE     I discussed the patient's findings and my recommendations with patient and family.IN  DETAIL    Principal Problem:    PATT (acute kidney injury)  Active Problems:    Rhabdomyolysis          Plan for disposition:SKILLED CARE AT REHAB WITH PT/OT; EVENTUALLY ASSISTED LIVING IS NEEDED FOR SAFETY    Willa Martinez MD  11/04/23  08:24 EDT

## 2023-11-04 NOTE — NURSING NOTE
Spoke with Jerry (son) he advised all the siblings are agreeable to DNR/DNI. Dr. Martinez called back and advised this nurse that if the family agrees to DNR/DNI that I could put that order in as verbal over the phone approved.  Dr Martinez also asked that we have paper work for out of hospital DNR available for her tomorrow. I advised we would.

## 2023-11-05 LAB
ALBUMIN SERPL-MCNC: 3 G/DL (ref 3.5–5.2)
ALBUMIN/GLOB SERPL: 1.1 G/DL
ALP SERPL-CCNC: 55 U/L (ref 39–117)
ALT SERPL W P-5'-P-CCNC: 14 U/L (ref 1–33)
ANION GAP SERPL CALCULATED.3IONS-SCNC: 10 MMOL/L (ref 5–15)
AST SERPL-CCNC: 15 U/L (ref 1–32)
BACTERIA SPEC AEROBE CULT: ABNORMAL
BILIRUB SERPL-MCNC: 0.2 MG/DL (ref 0–1.2)
BUN SERPL-MCNC: 20 MG/DL (ref 8–23)
BUN/CREAT SERPL: 24.4 (ref 7–25)
CALCIUM SPEC-SCNC: 9.1 MG/DL (ref 8.6–10.5)
CHLORIDE SERPL-SCNC: 109 MMOL/L (ref 98–107)
CO2 SERPL-SCNC: 22 MMOL/L (ref 22–29)
CREAT SERPL-MCNC: 0.82 MG/DL (ref 0.57–1)
DEPRECATED RDW RBC AUTO: 46.4 FL (ref 37–54)
EGFRCR SERPLBLD CKD-EPI 2021: 69.8 ML/MIN/1.73
ERYTHROCYTE [DISTWIDTH] IN BLOOD BY AUTOMATED COUNT: 14.3 % (ref 12.3–15.4)
GLOBULIN UR ELPH-MCNC: 2.8 GM/DL
GLUCOSE SERPL-MCNC: 102 MG/DL (ref 65–99)
HCT VFR BLD AUTO: 38.2 % (ref 34–46.6)
HGB BLD-MCNC: 12.6 G/DL (ref 12–15.9)
MAGNESIUM SERPL-MCNC: 1.9 MG/DL (ref 1.6–2.4)
MCH RBC QN AUTO: 31 PG (ref 26.6–33)
MCHC RBC AUTO-ENTMCNC: 33.1 G/DL (ref 31.5–35.7)
MCV RBC AUTO: 93.7 FL (ref 79–97)
PHOSPHATE SERPL-MCNC: 4.2 MG/DL (ref 2.5–4.5)
PLATELET # BLD AUTO: 596 10*3/MM3 (ref 140–450)
PMV BLD AUTO: 6.7 FL (ref 6–12)
POTASSIUM SERPL-SCNC: 4 MMOL/L (ref 3.5–5.2)
PROT SERPL-MCNC: 5.8 G/DL (ref 6–8.5)
RBC # BLD AUTO: 4.08 10*6/MM3 (ref 3.77–5.28)
SODIUM SERPL-SCNC: 141 MMOL/L (ref 136–145)
WBC NRBC COR # BLD: 8.7 10*3/MM3 (ref 3.4–10.8)

## 2023-11-05 PROCEDURE — 94760 N-INVAS EAR/PLS OXIMETRY 1: CPT

## 2023-11-05 PROCEDURE — 63710000001 DIPHENHYDRAMINE PER 50 MG: Performed by: FAMILY MEDICINE

## 2023-11-05 PROCEDURE — 94664 DEMO&/EVAL PT USE INHALER: CPT

## 2023-11-05 PROCEDURE — 80053 COMPREHEN METABOLIC PANEL: CPT | Performed by: FAMILY MEDICINE

## 2023-11-05 PROCEDURE — 25010000002 CEFTRIAXONE PER 250 MG: Performed by: FAMILY MEDICINE

## 2023-11-05 PROCEDURE — 25010000002 ENOXAPARIN PER 10 MG: Performed by: FAMILY MEDICINE

## 2023-11-05 PROCEDURE — 85027 COMPLETE CBC AUTOMATED: CPT | Performed by: INTERNAL MEDICINE

## 2023-11-05 PROCEDURE — 94799 UNLISTED PULMONARY SVC/PX: CPT

## 2023-11-05 PROCEDURE — 83735 ASSAY OF MAGNESIUM: CPT | Performed by: FAMILY MEDICINE

## 2023-11-05 PROCEDURE — 84100 ASSAY OF PHOSPHORUS: CPT | Performed by: FAMILY MEDICINE

## 2023-11-05 PROCEDURE — 25010000002 MAGNESIUM SULFATE IN D5W 1G/100ML (PREMIX) 1-5 GM/100ML-% SOLUTION: Performed by: INTERNAL MEDICINE

## 2023-11-05 RX ORDER — MAGNESIUM SULFATE 1 G/100ML
1 INJECTION INTRAVENOUS ONCE
Status: COMPLETED | OUTPATIENT
Start: 2023-11-05 | End: 2023-11-05

## 2023-11-05 RX ADMIN — Medication 10 ML: at 09:17

## 2023-11-05 RX ADMIN — METOPROLOL SUCCINATE 25 MG: 25 TABLET, EXTENDED RELEASE ORAL at 09:17

## 2023-11-05 RX ADMIN — CETIRIZINE HYDROCHLORIDE 10 MG: 10 TABLET, FILM COATED ORAL at 09:17

## 2023-11-05 RX ADMIN — ISOSORBIDE MONONITRATE 30 MG: 30 TABLET, EXTENDED RELEASE ORAL at 09:17

## 2023-11-05 RX ADMIN — GABAPENTIN 300 MG: 300 CAPSULE ORAL at 20:34

## 2023-11-05 RX ADMIN — DIPHENHYDRAMINE HYDROCHLORIDE 25 MG: 25 CAPSULE ORAL at 23:21

## 2023-11-05 RX ADMIN — CEFTRIAXONE 1000 MG: 1 INJECTION, POWDER, FOR SOLUTION INTRAMUSCULAR; INTRAVENOUS at 14:48

## 2023-11-05 RX ADMIN — IPRATROPIUM BROMIDE AND ALBUTEROL SULFATE 3 ML: .5; 3 SOLUTION RESPIRATORY (INHALATION) at 07:56

## 2023-11-05 RX ADMIN — IPRATROPIUM BROMIDE AND ALBUTEROL SULFATE 3 ML: .5; 3 SOLUTION RESPIRATORY (INHALATION) at 12:07

## 2023-11-05 RX ADMIN — MONTELUKAST 10 MG: 10 TABLET, FILM COATED ORAL at 20:34

## 2023-11-05 RX ADMIN — MICONAZOLE NITRATE 1 APPLICATION: 20 CREAM TOPICAL at 20:35

## 2023-11-05 RX ADMIN — BUDESONIDE 0.5 MG: 0.5 INHALANT RESPIRATORY (INHALATION) at 20:26

## 2023-11-05 RX ADMIN — Medication 10 ML: at 20:34

## 2023-11-05 RX ADMIN — ENOXAPARIN SODIUM 40 MG: 40 INJECTION, SOLUTION SUBCUTANEOUS at 15:45

## 2023-11-05 RX ADMIN — BUDESONIDE 0.5 MG: 0.5 INHALANT RESPIRATORY (INHALATION) at 08:02

## 2023-11-05 RX ADMIN — IPRATROPIUM BROMIDE AND ALBUTEROL SULFATE 3 ML: .5; 3 SOLUTION RESPIRATORY (INHALATION) at 20:22

## 2023-11-05 RX ADMIN — MAGNESIUM SULFATE HEPTAHYDRATE 1 G: 1 INJECTION, SOLUTION INTRAVENOUS at 09:17

## 2023-11-05 RX ADMIN — ASPIRIN 81 MG: 81 TABLET, COATED ORAL at 09:17

## 2023-11-05 NOTE — PROGRESS NOTES
"NEPHROLOGY PROGRESS NOTE------[facility]    [facility]  [phone]  Clark Francis MD      Patient Care Team:  Willa Martinez MD as PCP - General  Willa Martinez MD as PCP - Family Medicine  Amor López MD as Consulting Physician (Cardiology)  Teddy Francis MD as Consulting Physician (Nephrology)      Provider:  Clark Francis MD  Patient Name: Rowan Guzman  :  1935    SUBJECTIVE:    F/U ARF/PATT    No complaints. Tired. No SOB, CP, dysuria. No palpitations.     Medication:  aspirin, 81 mg, Oral, Daily  budesonide, 0.5 mg, Nebulization, BID - RT  cefTRIAXone, 1,000 mg, Intravenous, Q24H  cetirizine, 10 mg, Oral, Daily  enoxaparin, 40 mg, Subcutaneous, Q24H  gabapentin, 300 mg, Oral, Nightly  ipratropium-albuterol, 3 mL, Nebulization, TID - RT  isosorbide mononitrate, 30 mg, Oral, Daily  metoprolol succinate XL, 25 mg, Oral, Daily  montelukast, 10 mg, Oral, Nightly  sodium chloride, 10 mL, Intravenous, Q12H      Pharmacy to Dose enoxaparin (LOVENOX),         OBJECTIVE    Vital Sign Min/Max for last 24 hours  Temp  Min: 97.6 °F (36.4 °C)  Max: 97.9 °F (36.6 °C)   BP  Min: 151/64  Max: 160/76   Pulse  Min: 80  Max: 91   Resp  Min: 15  Max: 22   SpO2  Min: 93 %  Max: 99 %   No data recorded   No data recorded     Flowsheet Rows      Flowsheet Row First Filed Value   Admission Height 162.6 cm (64\") Documented at 2023 1523   Admission Weight 68 kg (150 lb) Documented at 2023 1523            No intake/output data recorded.  I/O last 3 completed shifts:  In: 1455 [P.O.:480; I.V.:975]  Out: 350 [Urine:350]    Physical Exam:  General Appearance: alert, appears stated age and cooperative  Head: normocephalic, without obvious abnormality and atraumatic    Eyes: conjunctivae and sclerae normal and no icterus  Neck: supple and no JVD  Lungs: clear to auscultation and respirations regular  Heart: regular " "rhythm & normal rate and normal S1, S2 +AVANI  Chest Wall: no abnormalities observed  Abdomen: normal bowel sounds and soft, nontender  Extremities: moves extremities well, no edema, no cyanosis +DJD  Skin: no bleeding, bruising or rash    Neurologic: Alert, and oriented. No focal deficits    Labs:    WBC WBC   Date Value Ref Range Status   11/05/2023 8.70 3.40 - 10.80 10*3/mm3 Final   11/04/2023 7.40 3.40 - 10.80 10*3/mm3 Final   11/03/2023 11.40 (H) 3.40 - 10.80 10*3/mm3 Final   11/02/2023 12.47 (H) 3.40 - 10.80 10*3/mm3 Final      HGB Hemoglobin   Date Value Ref Range Status   11/05/2023 12.6 12.0 - 15.9 g/dL Final   11/04/2023 11.7 (L) 12.0 - 15.9 g/dL Final   11/03/2023 11.9 (L) 12.0 - 15.9 g/dL Final   11/02/2023 13.1 12.0 - 15.9 g/dL Final      HCT Hematocrit   Date Value Ref Range Status   11/05/2023 38.2 34.0 - 46.6 % Final   11/04/2023 35.0 34.0 - 46.6 % Final   11/03/2023 35.3 34.0 - 46.6 % Final   11/02/2023 40.5 34.0 - 46.6 % Final      Platelets No results found for: \"LABPLAT\"   MCV MCV   Date Value Ref Range Status   11/05/2023 93.7 79.0 - 97.0 fL Final   11/04/2023 92.1 79.0 - 97.0 fL Final   11/03/2023 91.6 79.0 - 97.0 fL Final   11/02/2023 93.5 79.0 - 97.0 fL Final          Sodium Sodium   Date Value Ref Range Status   11/05/2023 141 136 - 145 mmol/L Final   11/04/2023 142 136 - 145 mmol/L Final   11/03/2023 141 136 - 145 mmol/L Final   11/02/2023 134 (L) 136 - 145 mmol/L Final      Potassium Potassium   Date Value Ref Range Status   11/05/2023 4.0 3.5 - 5.2 mmol/L Final     Comment:     Slight hemolysis detected by analyzer. Results may be affected.   11/04/2023 3.8 3.5 - 5.2 mmol/L Final   11/03/2023 3.8 3.5 - 5.2 mmol/L Final     Comment:     Slight hemolysis detected by analyzer. Results may be affected.   11/02/2023 3.8 3.5 - 5.2 mmol/L Final      Chloride Chloride   Date Value Ref Range Status   11/05/2023 109 (H) 98 - 107 mmol/L Final   11/04/2023 110 (H) 98 - 107 mmol/L Final   11/03/2023 109 " "(H) 98 - 107 mmol/L Final   11/02/2023 99 98 - 107 mmol/L Final      CO2 CO2   Date Value Ref Range Status   11/05/2023 22.0 22.0 - 29.0 mmol/L Final   11/04/2023 23.0 22.0 - 29.0 mmol/L Final   11/03/2023 22.0 22.0 - 29.0 mmol/L Final   11/02/2023 24.8 22.0 - 29.0 mmol/L Final      BUN BUN   Date Value Ref Range Status   11/05/2023 20 8 - 23 mg/dL Final   11/04/2023 21 8 - 23 mg/dL Final   11/03/2023 24 (H) 8 - 23 mg/dL Final   11/02/2023 26 (H) 8 - 23 mg/dL Final      Creatinine Creatinine   Date Value Ref Range Status   11/05/2023 0.82 0.57 - 1.00 mg/dL Final   11/04/2023 0.84 0.57 - 1.00 mg/dL Final   11/03/2023 0.91 0.57 - 1.00 mg/dL Final   11/02/2023 1.97 (H) 0.57 - 1.00 mg/dL Final      Calcium Calcium   Date Value Ref Range Status   11/05/2023 9.1 8.6 - 10.5 mg/dL Final   11/04/2023 8.8 8.6 - 10.5 mg/dL Final   11/03/2023 8.0 (L) 8.6 - 10.5 mg/dL Final   11/02/2023 9.2 8.6 - 10.5 mg/dL Final      PO4 No components found for: \"PO4\"   Albumin Albumin   Date Value Ref Range Status   11/05/2023 3.0 (L) 3.5 - 5.2 g/dL Final   11/04/2023 2.8 (L) 3.5 - 5.2 g/dL Final   11/03/2023 2.9 (L) 3.5 - 5.2 g/dL Final   11/02/2023 3.8 3.5 - 5.2 g/dL Final      Magnesium Magnesium   Date Value Ref Range Status   11/05/2023 1.9 1.6 - 2.4 mg/dL Final   11/04/2023 2.0 1.6 - 2.4 mg/dL Final   11/03/2023 1.9 1.6 - 2.4 mg/dL Final   11/02/2023 2.0 1.6 - 2.4 mg/dL Final      Uric Acid No components found for: \"URIC ACID\"     Imaging Results (Last 72 Hours)       Procedure Component Value Units Date/Time    US Renal Bilateral [[id number]] Collected: 11/03/23 0944     Updated: 11/03/23 0947    Narrative:      US RENAL BILATERAL    Date of Exam: 11/3/2023 8:55 AM EDT    Indication: ARF/PATT/CRF/CKD.    Comparison: No comparisons available.    Technique: Grayscale and color Doppler ultrasound evaluation of the kidneys and urinary bladder was performed.      Findings:  The right kidney measures 9.35 in length by 4.63 transversely and the " left measures 9.42 cm in length by 5.08 cm transversely. There is no hydronephrosis and there are no solid masses. There is a small left cortical cyst measuring 9 x 9 x 10 mm.      Impression:      Impression:  No obstruction. Small simple cyst in the left kidney noted.    Electronically Signed: Leyda Joseph MD    11/3/2023 9:45 AM EDT    Workstation ID: [id number]    CT Head Without Contrast [[id number]] Collected: 11/02/23 1652     Updated: 11/02/23 1700    Narrative:      CT HEAD WO CONTRAST, CT CERVICAL SPINE WO CONTRAST    Date of Exam: 11/2/2023 3:28 PM CDT    Indication: Head trauma, minor (Age >= 65y)  Head trauma, moderate-severe.    Comparison: None available.    Technique: Axial CT images were obtained of the head and cervical spine without contrast administration.  Coronal in sagittal reconstructions were performed.  Automated exposure control and iterative reconstruction methods were used.      Findings:  CT HEAD:  There is no evidence of acute infarction.    There there is no acute intracranial hemorrhage. There are no extra-axial collections.    Ventricles and CSF spaces are symmetrically prominent. No mass effect nor hydrocephalus.    Moderate nonspecific white matter hypoattenuation suggestive of microvascular scheming disease without mass effect. 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     Paranasal sinuses and mastoid air cells are adequately aerated.     Osseous structures and orbits appear intact.        CT CERVICAL SPINE:  OSSEOUS STRUCTURES: No fracture nor bony destructive process.    ALIGNMENT: There is grade 1 spondylolisthesis at C4/5.    DISC SPACE: Moderate lower cervical spondylosis.    JOINTS: Moderate mid cervical facet arthropathy.    SOFT TISSUES:  Unremarkable    LUNG APICES: Unremarkable    LEVELS: There is moderate osseous left neuroforaminal stenosis at C4/5.      Impression:      Impression:  1.No acute traumatic injury within the head nor cervical  spine identified.  2.Age-related changes as detailed above.      Electronically Signed: Kash Faria MD    11/2/2023 3:58 PM CDT    Workstation ID: [id number]    CT Cervical Spine Without Contrast [[id number]] Collected: 11/02/23 1652     Updated: 11/02/23 1700    Narrative:      CT HEAD WO CONTRAST, CT CERVICAL SPINE WO CONTRAST    Date of Exam: 11/2/2023 3:28 PM CDT    Indication: Head trauma, minor (Age >= 65y)  Head trauma, moderate-severe.    Comparison: None available.    Technique: Axial CT images were obtained of the head and cervical spine without contrast administration.  Coronal in sagittal reconstructions were performed.  Automated exposure control and iterative reconstruction methods were used.      Findings:  CT HEAD:  There is no evidence of acute infarction.    There there is no acute intracranial hemorrhage. There are no extra-axial collections.    Ventricles and CSF spaces are symmetrically prominent. No mass effect nor hydrocephalus.    Moderate nonspecific white matter hypoattenuation suggestive of microvascular scheming disease without mass effect. 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     Paranasal sinuses and mastoid air cells are adequately aerated.     Osseous structures and orbits appear intact.        CT CERVICAL SPINE:  OSSEOUS STRUCTURES: No fracture nor bony destructive process.    ALIGNMENT: There is grade 1 spondylolisthesis at C4/5.    DISC SPACE: Moderate lower cervical spondylosis.    JOINTS: Moderate mid cervical facet arthropathy.    SOFT TISSUES:  Unremarkable    LUNG APICES: Unremarkable    LEVELS: There is moderate osseous left neuroforaminal stenosis at C4/5.      Impression:      Impression:  1.No acute traumatic injury within the head nor cervical spine identified.  2.Age-related changes as detailed above.      Electronically Signed: Kash Faria MD    11/2/2023 3:58 PM CDT    Workstation ID: [id number]    XR Chest 2 View [[id number]] Collected:  11/02/23 1654     Updated: 11/02/23 1657    Narrative:      XR CHEST 2 VW    Date of Exam: 11/2/2023 3:29 PM CDT    Indication: fall    Comparison: CT chest 5/27/2021    Findings:  The heart is normal in size. The lungs are hyperinflated. No evidence for pneumothorax or pleural effusion. There is a nodule at the right lung base measuring 7 mm. No acute osseous injury noted.      Impression:      Impression:  7 mm nodule at the right lung base. A follow-up noncontrast CT thorax can be obtained for evaluation.      Electronically Signed: Karyn Faria MD    11/2/2023 3:55 PM CDT    Workstation ID: [id number]    XR Hip With or Without Pelvis 2 - 3 View Left [[id number]] Collected: 11/02/23 1652     Updated: 11/02/23 1657    Narrative:      XR HIP W OR WO PELVIS 2-3 VIEW LEFT    Date of Exam: 11/2/2023 4:29 PM EDT    Indication: fall    Comparison: None available.    Findings:  No definite acute fracture or dislocation. Please note that evaluation of the left pelvic rim is mildly degraded due to patient positioning.    Extensive degenerative changes of the left hip with deformity of the left femoral head with associated adjacent sclerosis.  Mild degenerative changes of the right hip.    No suspicious soft tissue findings. Mild stool burden in the ascending colon      Impression:      Impression:  No definite acute fracture or dislocation.    Extensive degenerative changes with chronic appearing deformity of the left femoral head. Underlying AVN is not excluded.      Electronically Signed: Johnny Ventura DO    11/2/2023 4:55 PM EDT    Workstation ID: [id number]    XR Knee 3 View Left [[id number]] Collected: 11/02/23 1652     Updated: 11/02/23 1656    Narrative:      XR KNEE 3 VW RIGHT, XR KNEE 3 VW LEFT    Date of Exam: 11/2/2023 3:29 PM CDT    Indication: fall    Comparison: None available.    Findings:  Evaluation of the right knee shows no evidence for acute fracture or acute alignment abnormality. There is advanced  lateral compartment joint space narrowing with bone-on-bone changes and osteophyte formation. Patellofemoral joint space narrowing is   noted. There is a suprapatellar joint effusion.    Evaluation of the left knee shows no evidence for acute fracture or acute alignment abnormality. Patellofemoral joint space narrowing is noted. No joint effusion.      Impression:      Impression:  No evidence for acute osseous injury to the right or left knee.    Advanced right knee lateral compartment degenerative arthrosis and joint effusion.      Electronically Signed: Karyn Faria MD    11/2/2023 3:54 PM CDT    Workstation ID: [id number]    XR Knee 3 View Right [[id number]] Collected: 11/02/23 1652     Updated: 11/02/23 1656    Narrative:      XR KNEE 3 VW RIGHT, XR KNEE 3 VW LEFT    Date of Exam: 11/2/2023 3:29 PM CDT    Indication: fall    Comparison: None available.    Findings:  Evaluation of the right knee shows no evidence for acute fracture or acute alignment abnormality. There is advanced lateral compartment joint space narrowing with bone-on-bone changes and osteophyte formation. Patellofemoral joint space narrowing is   noted. There is a suprapatellar joint effusion.    Evaluation of the left knee shows no evidence for acute fracture or acute alignment abnormality. Patellofemoral joint space narrowing is noted. No joint effusion.      Impression:      Impression:  No evidence for acute osseous injury to the right or left knee.    Advanced right knee lateral compartment degenerative arthrosis and joint effusion.      Electronically Signed: Karyn Faria MD    11/2/2023 3:54 PM CDT    Workstation ID: [id number]    XR Elbow 3+ View Left [[id number]] Collected: 11/02/23 1651     Updated: 11/02/23 1654    Narrative:      XR ELBOW 3+ VW LEFT    Date of Exam: 11/2/2023 4:29 PM EDT    Indication: fall    Comparison: None available.    Findings:  There is no evidence of fracture or dislocation.  No focal lesions identified.  No erosions.    Mild degenerative changes.    No focal soft tissue abnormalities identified.      Impression:      Impression:  No acute abnormality.      Electronically Signed: Johnny Ventura DO    11/2/2023 4:52 PM EDT    Workstation ID: [id number]            Results for orders placed during the hospital encounter of 11/02/23    XR Knee 3 View Right    Narrative  XR KNEE 3 VW RIGHT, XR KNEE 3 VW LEFT    Date of Exam: 11/2/2023 3:29 PM CDT    Indication: fall    Comparison: None available.    Findings:  Evaluation of the right knee shows no evidence for acute fracture or acute alignment abnormality. There is advanced lateral compartment joint space narrowing with bone-on-bone changes and osteophyte formation. Patellofemoral joint space narrowing is  noted. There is a suprapatellar joint effusion.    Evaluation of the left knee shows no evidence for acute fracture or acute alignment abnormality. Patellofemoral joint space narrowing is noted. No joint effusion.    Impression  Impression:  No evidence for acute osseous injury to the right or left knee.    Advanced right knee lateral compartment degenerative arthrosis and joint effusion.      Electronically Signed: Karyn Faria MD  11/2/2023 3:54 PM CDT  Workstation ID: [id number]      XR Knee 3 View Left    Narrative  XR KNEE 3 VW RIGHT, XR KNEE 3 VW LEFT    Date of Exam: 11/2/2023 3:29 PM CDT    Indication: fall    Comparison: None available.    Findings:  Evaluation of the right knee shows no evidence for acute fracture or acute alignment abnormality. There is advanced lateral compartment joint space narrowing with bone-on-bone changes and osteophyte formation. Patellofemoral joint space narrowing is  noted. There is a suprapatellar joint effusion.    Evaluation of the left knee shows no evidence for acute fracture or acute alignment abnormality. Patellofemoral joint space narrowing is noted. No joint effusion.    Impression  Impression:  No evidence for acute  osseous injury to the right or left knee.    Advanced right knee lateral compartment degenerative arthrosis and joint effusion.      Electronically Signed: Karyn Faria MD  11/2/2023 3:54 PM CDT  Workstation ID: [id number]      XR Chest 2 View    Narrative  XR CHEST 2 VW    Date of Exam: 11/2/2023 3:29 PM CDT    Indication: fall    Comparison: CT chest 5/27/2021    Findings:  The heart is normal in size. The lungs are hyperinflated. No evidence for pneumothorax or pleural effusion. There is a nodule at the right lung base measuring 7 mm. No acute osseous injury noted.    Impression  Impression:  7 mm nodule at the right lung base. A follow-up noncontrast CT thorax can be obtained for evaluation.      Electronically Signed: Karyn Faria MD  11/2/2023 3:55 PM CDT  Workstation ID: [id number]            ASSESSMENT / PLAN      PATT (acute kidney injury)    Rhabdomyolysis    ARF/PATT------Nonoliguric. Appears to have ARF/PATT on top of CRF/CKD STG 2 with a baseline  Serum creatinine of about 0.9. PATT resolved. Stable off of IVFs     2. HYPOCALCEMIA-------Replaced     3. DMII--------BS ok. Glucometers, SSI     4. HTN WITH CKD-----BP ok this AM. Avoid hypotension. No ACE-I/ARB/DRI/diuretic for now     5. CAD/ELEVATED TROPONIN------No angina or gross overload.        6. HYPERLIPIDEMIA----CK and TSH ok     7. OA/DJD------No NSAIDs. Uric ok     8. KETONURIA/DEHYDRATION-----Clinically better.      9. DELIRIUM------Resolved     10. S/P FALL-----Avoid hypotension. Cardiac w/u underway. CK ok     11. DVT PROPHYLAXIS------Lovenox     12. PUD PROPHYLAXIS-----Pepcid    13. E.COLI UTI-------On Rocephin    OKAY FROM RENAL STANDPOINT TO D/C TODAY    Clark Francis MD  [facility]  [phone]  11/05/23  07:37 EST

## 2023-11-05 NOTE — PROGRESS NOTES
"DATE/TIME OF ADMISSION:  11/2/2023  3:25 PM     LOS: 2 days   Patient Care Team:  Willa Martinez MD as PCP - General  Willa Martinez MD as PCP - Family Medicine  Amor López MD as Consulting Physician (Cardiology)  Teddy Francis MD as Consulting Physician (Nephrology)  Consults       Date and Time Order Name Status Description    11/3/2023  5:30 AM Inpatient Nephrology Consult Completed     11/3/2023  4:22 AM Inpatient Cardiology Consult              Subjective     Interval History: UTI AND ON IV ROCEPHIN THROUGH TOMORROW    Patient Complaints: CONFUSED, O X 1; C/O VAGINAL ITCHING    History taken from: patient AND DAUGHTER AT BEDSIDE    Review of Systems        Objective     Vital Signs  /76 (BP Location: Right arm, Patient Position: Lying)   Pulse 83   Temp 97.9 °F (36.6 °C) (Oral)   Resp 22   Ht 162.6 cm (64\")   Wt 70.3 kg (155 lb)   SpO2 97%   BMI 26.61 kg/m²     Physical Exam:        General Appearance:    Alert, cooperative, in no acute distress; CONFUSED   Head:    Normocephalic, without obvious abnormality, atraumatic   Eyes:            Lids and lashes normal, conjunctivae and sclerae normal, no   icterus, no pallor, corneas clear, PERRLA   Ears:    Ears appear intact with no abnormalities noted   Throat:   No oral lesions, no thrush, oral mucosa moist   Neck:   No adenopathy, supple, trachea midline, no thyromegaly, no   carotid bruit, no JVD   Lungs:     Clear to auscultation,respirations regular, even and                  unlabored    Heart:    Regular rhythm and normal rate, normal S1 and S2, no            murmur, no gallop, no rub, no click   Chest Wall:    No abnormalities observed   Abdomen:     Normal bowel sounds, no masses, no organomegaly, soft        non-tender, non-distended, no guarding, no rebound                tenderness   Extremities:   Moves all extremities well, 1+edema, no cyanosis, no             redness; NO CALF TENDERNESS   Pulses:   Pulses " palpable and equal bilaterally   Skin:   No bleeding, bruising or rash   Lymph nodes:   No palpable adenopathy   Neurologic:   CONFUSED; alert and O x 1        I HAVE PERSONALLY EXAMINED AND REVIEWED THE PATIENT'S RECORD     Lab Results (last 48 hours)       Procedure Component Value Units Date/Time    Urine Culture - Urine, Urine, Clean Catch [[id number]]  (Abnormal)  (Susceptibility) Collected: 11/03/23 1042    Specimen: Urine, Clean Catch Updated: 11/05/23 0554     Urine Culture >100,000 CFU/mL Escherichia coli    Narrative:      Colonization of the urinary tract without infection is common. Treatment is discouraged unless the patient is symptomatic, pregnant, or undergoing an invasive urologic procedure.    Susceptibility        Escherichia coli      VANESSA      Ampicillin Susceptible      Ampicillin + Sulbactam Susceptible      Cefazolin Susceptible      Cefepime Susceptible      Ceftazidime Susceptible      Ceftriaxone Susceptible      Gentamicin Susceptible      Levofloxacin Susceptible      Nitrofurantoin Susceptible      Piperacillin + Tazobactam Susceptible      Trimethoprim + Sulfamethoxazole Susceptible                           Comprehensive Metabolic Panel [[id number]]  (Abnormal) Collected: 11/05/23 0246    Specimen: Blood Updated: 11/05/23 0342     Glucose 102 mg/dL      BUN 20 mg/dL      Creatinine 0.82 mg/dL      Sodium 141 mmol/L      Potassium 4.0 mmol/L      Comment: Slight hemolysis detected by analyzer. Results may be affected.        Chloride 109 mmol/L      CO2 22.0 mmol/L      Calcium 9.1 mg/dL      Total Protein 5.8 g/dL      Albumin 3.0 g/dL      ALT (SGPT) 14 U/L      AST (SGOT) 15 U/L      Alkaline Phosphatase 55 U/L      Total Bilirubin 0.2 mg/dL      Globulin 2.8 gm/dL      A/G Ratio 1.1 g/dL      BUN/Creatinine Ratio 24.4     Anion Gap 10.0 mmol/L      eGFR 69.8 mL/min/1.73     Narrative:      GFR Normal >60  Chronic Kidney Disease <60  Kidney Failure <15    The GFR formula is only valid  for adults with stable renal function between ages 18 and 70.    Magnesium [[id number]]  (Normal) Collected: 11/05/23 0246    Specimen: Blood Updated: 11/05/23 0342     Magnesium 1.9 mg/dL     Phosphorus [[id number]]  (Normal) Collected: 11/05/23 0246    Specimen: Blood Updated: 11/05/23 0342     Phosphorus 4.2 mg/dL     CBC (No Diff) [[id number]]  (Abnormal) Collected: 11/05/23 0246    Specimen: Blood Updated: 11/05/23 0323     WBC 8.70 10*3/mm3      RBC 4.08 10*6/mm3      Hemoglobin 12.6 g/dL      Hematocrit 38.2 %      MCV 93.7 fL      MCH 31.0 pg      MCHC 33.1 g/dL      RDW 14.3 %      RDW-SD 46.4 fl      MPV 6.7 fL      Platelets 596 10*3/mm3     CK [[id number]]  (Normal) Collected: 11/04/23 0412    Specimen: Blood Updated: 11/04/23 0455     Creatine Kinase 136 U/L     Comprehensive Metabolic Panel [[id number]]  (Abnormal) Collected: 11/04/23 0412    Specimen: Blood Updated: 11/04/23 0455     Glucose 136 mg/dL      BUN 21 mg/dL      Creatinine 0.84 mg/dL      Sodium 142 mmol/L      Potassium 3.8 mmol/L      Chloride 110 mmol/L      CO2 23.0 mmol/L      Calcium 8.8 mg/dL      Total Protein 5.3 g/dL      Albumin 2.8 g/dL      ALT (SGPT) 6 U/L      AST (SGOT) 11 U/L      Alkaline Phosphatase 69 U/L      Total Bilirubin <0.2 mg/dL      Globulin 2.5 gm/dL      A/G Ratio 1.1 g/dL      BUN/Creatinine Ratio 25.0     Anion Gap 9.0 mmol/L      eGFR 67.8 mL/min/1.73     Narrative:      GFR Normal >60  Chronic Kidney Disease <60  Kidney Failure <15    The GFR formula is only valid for adults with stable renal function between ages 18 and 70.    Magnesium [[id number]]  (Normal) Collected: 11/04/23 0412    Specimen: Blood Updated: 11/04/23 0455     Magnesium 2.0 mg/dL     Phosphorus [[id number]]  (Normal) Collected: 11/04/23 0412    Specimen: Blood Updated: 11/04/23 0455     Phosphorus 3.5 mg/dL     Calcium, Ionized [[id number]]  (Normal) Collected: 11/04/23 0412    Specimen: Blood Updated: 11/04/23 0442     Ionized Calcium 1.24  mmol/L     CBC Auto Differential [[id number]]  (Abnormal) Collected: 11/04/23 0412    Specimen: Blood Updated: 11/04/23 0432     WBC 7.40 10*3/mm3      RBC 3.80 10*6/mm3      Hemoglobin 11.7 g/dL      Hematocrit 35.0 %      MCV 92.1 fL      MCH 30.9 pg      MCHC 33.6 g/dL      RDW 14.5 %      RDW-SD 49.9 fl      MPV 6.8 fL      Platelets 458 10*3/mm3      Neutrophil % 59.2 %      Lymphocyte % 26.5 %      Monocyte % 10.2 %      Eosinophil % 2.8 %      Basophil % 1.3 %      Neutrophils, Absolute 4.40 10*3/mm3      Lymphocytes, Absolute 2.00 10*3/mm3      Monocytes, Absolute 0.80 10*3/mm3      Eosinophils, Absolute 0.20 10*3/mm3      Basophils, Absolute 0.10 10*3/mm3      nRBC 0.0 /100 WBC     POC Glucose Once [[id number]]  (Abnormal) Collected: 11/03/23 1657    Specimen: Blood Updated: 11/03/23 1700     Glucose 168 mg/dL      Comment: Serial Number: [id number]:  [id number]       Sodium, Urine, Random - Urine, Clean Catch [[id number]] Collected: 11/03/23 1042    Specimen: Urine, Clean Catch Updated: 11/03/23 1425     Sodium, Urine 65 mmol/L     Narrative:      Reference intervals for random urine have not been established.  Clinical usage is dependent upon physician's interpretation in combination with other laboratory tests.       Eosinophil Smear - Urine, Urine, Clean Catch [[id number]]  (Normal) Collected: 11/03/23 1042    Specimen: Urine, Clean Catch Updated: 11/03/23 1218     Eosinophil Smear 0 % EOS/100 Cells     POC Glucose Once [[id number]]  (Abnormal) Collected: 11/03/23 1215    Specimen: Blood Updated: 11/03/23 1216     Glucose 133 mg/dL      Comment: Serial Number: [id number]:  [id number]       POC Glucose Once [[id number]]  (Normal) Collected: 11/03/23 1117    Specimen: Blood Updated: 11/03/23 1118     Glucose 94 mg/dL      Comment: Serial Number: [id number]:  [id number]       Urinalysis, Microscopic Only - Urine, Clean Catch [[id number]]  (Abnormal) Collected: 11/03/23 1042    Specimen: Urine,  Clean Catch Updated: 11/03/23 1055     RBC, UA 0-2 /HPF      WBC, UA 11-20 /HPF      Bacteria, UA 4+ /HPF      Squamous Epithelial Cells, UA 0-2 /HPF      Hyaline Casts, UA 0-2 /LPF      Methodology Automated Microscopy    Urinalysis With Culture If Indicated - Urine, Clean Catch [[id number]]  (Abnormal) Collected: 11/03/23 1042    Specimen: Urine, Clean Catch Updated: 11/03/23 1055     Color, UA Yellow     Appearance, UA Clear     pH, UA 5.5     Specific Gravity, UA 1.013     Glucose, UA Negative     Ketones, UA Negative     Bilirubin, UA Negative     Blood, UA Negative     Protein, UA Negative     Leuk Esterase, UA Negative     Nitrite, UA Positive     Urobilinogen, UA 0.2 E.U./dL    Narrative:      In absence of clinical symptoms, the presence of pyuria, bacteria, and/or nitrites on the urinalysis result does not correlate with infection.    Uric Acid [[id number]]  (Abnormal) Collected: 11/03/23 0626    Specimen: Blood Updated: 11/03/23 1002     Uric Acid 6.1 mg/dL     Hemoglobin A1c [[id number]]  (Abnormal) Collected: 11/03/23 0626    Specimen: Blood Updated: 11/03/23 0941     Hemoglobin A1C 5.80 %     TSH [[id number]]  (Normal) Collected: 11/03/23 0626    Specimen: Blood Updated: 11/03/23 0841     TSH 1.360 uIU/mL     Comprehensive Metabolic Panel [[id number]]  (Abnormal) Collected: 11/03/23 0626    Specimen: Blood Updated: 11/03/23 0748     Glucose 90 mg/dL      BUN 24 mg/dL      Creatinine 0.91 mg/dL      Sodium 141 mmol/L      Potassium 3.8 mmol/L      Comment: Slight hemolysis detected by analyzer. Results may be affected.        Chloride 109 mmol/L      CO2 22.0 mmol/L      Calcium 8.0 mg/dL      Total Protein 5.6 g/dL      Albumin 2.9 g/dL      ALT (SGPT) 14 U/L      AST (SGOT) 19 U/L      Alkaline Phosphatase 74 U/L      Total Bilirubin 0.4 mg/dL      Globulin 2.7 gm/dL      A/G Ratio 1.1 g/dL      BUN/Creatinine Ratio 26.4     Anion Gap 10.0 mmol/L      eGFR 61.6 mL/min/1.73     Narrative:      GFR Normal  >60  Chronic Kidney Disease <60  Kidney Failure <15    The GFR formula is only valid for adults with stable renal function between ages 18 and 70.    High Sensitivity Troponin T [[id number]]  (Abnormal) Collected: 11/03/23 0626    Specimen: Blood Updated: 11/03/23 0747     HS Troponin T 160 ng/L     Narrative:      High Sensitive Troponin T Reference Range:  <10.0 ng/L- Negative Female for AMI  <15.0 ng/L- Negative Male for AMI  >=10 - Abnormal Female indicating possible myocardial injury.  >=15 - Abnormal Male indicating possible myocardial injury.   Clinicians would have to utilize clinical acumen, EKG, Troponin, and serial changes to determine if it is an Acute Myocardial Infarction or myocardial injury due to an underlying chronic condition.         Magnesium [[id number]]  (Normal) Collected: 11/03/23 0626    Specimen: Blood Updated: 11/03/23 0739     Magnesium 1.9 mg/dL     CK [[id number]]  (Abnormal) Collected: 11/03/23 0626    Specimen: Blood Updated: 11/03/23 0736     Creatine Kinase 261 U/L     Lipid Panel [[id number]]  (Abnormal) Collected: 11/03/23 0626    Specimen: Blood Updated: 11/03/23 0736     Total Cholesterol 191 mg/dL      Triglycerides 67 mg/dL      HDL Cholesterol 63 mg/dL      LDL Cholesterol  116 mg/dL      VLDL Cholesterol 12 mg/dL      LDL/HDL Ratio 1.82    Narrative:      Cholesterol Reference Ranges  (U.[place]. Department of Health and Human Services ATP III Classifications)    Desirable          <200 mg/dL  Borderline High    200-239 mg/dL  High Risk          >240 mg/dL      Triglyceride Reference Ranges  (U.S. Department of Health and Human Services ATP III Classifications)    Normal           <150 mg/dL  Borderline High  150-199 mg/dL  High             200-499 mg/dL  Very High        >500 mg/dL    HDL Reference Ranges  (U.S. Department of Health and Human Services ATP III Classifications)    Low     <40 mg/dl (major risk factor for CHD)  High    >60 mg/dl ('negative' risk factor for  CHD)        LDL Reference Ranges  (U.S. Department of Health and Human Services ATP III Classifications)    Optimal          <100 mg/dL  Near Optimal     100-129 mg/dL  Borderline High  130-159 mg/dL  High             160-189 mg/dL  Very High        >189 mg/dL    Phosphorus [[id number]]  (Normal) Collected: 11/03/23 0626    Specimen: Blood Updated: 11/03/23 0736     Phosphorus 3.5 mg/dL     POC Glucose Once [[id number]]  (Normal) Collected: 11/03/23 0720    Specimen: Blood Updated: 11/03/23 0722     Glucose 83 mg/dL      Comment: Serial Number: [id number]:  [id number]       CBC (No Diff) [[id number]]  (Abnormal) Collected: 11/03/23 0626    Specimen: Blood Updated: 11/03/23 0712     WBC 11.40 10*3/mm3      RBC 3.85 10*6/mm3      Hemoglobin 11.9 g/dL      Hematocrit 35.3 %      MCV 91.6 fL      MCH 30.8 pg      MCHC 33.6 g/dL      RDW 14.2 %      RDW-SD 48.1 fl      MPV 7.0 fL      Platelets 460 10*3/mm3              Imaging Results (Last 48 Hours)       Procedure Component Value Units Date/Time    US Renal Bilateral [[id number]] Collected: 11/03/23 0944     Updated: 11/03/23 0947    Narrative:      US RENAL BILATERAL    Date of Exam: 11/3/2023 8:55 AM EDT    Indication: ARF/PATT/CRF/CKD.    Comparison: No comparisons available.    Technique: Grayscale and color Doppler ultrasound evaluation of the kidneys and urinary bladder was performed.      Findings:  The right kidney measures 9.35 in length by 4.63 transversely and the left measures 9.42 cm in length by 5.08 cm transversely. There is no hydronephrosis and there are no solid masses. There is a small left cortical cyst measuring 9 x 9 x 10 mm.      Impression:      Impression:  No obstruction. Small simple cyst in the left kidney noted.    Electronically Signed: Leyda Joseph MD    11/3/2023 9:45 AM EDT    Workstation ID: [id number]                 I reviewed the patient's new clinical results.    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  acetaminophen    aluminum-magnesium hydroxide-simethicone    calcium carbonate    dextrose    dextrose    diphenhydrAMINE    glucagon (human recombinant)    nitroglycerin    ondansetron **OR** ondansetron    Pharmacy to Dose enoxaparin (LOVENOX)    [COMPLETED] Insert peripheral IV **AND** sodium chloride    sodium chloride    sodium chloride    sodium chloride    sodium chloride     Assessment & Plan   Fall----PT/OT; SKILLED INPT REHAB UPON DISCHARGE HOPEFULLY MONDAY AM PENDING BED AVAILABILITY; HOPING FOR SILVER CREST  Altered mental status  Obtain MRI of the head---CANCELED MRI AS SHE IS AT HER BASELINE FOR DEMENTIA AND CT NOT REMARKABLE EXCEPT FOR AGE RELATED CHANGE     Acute kidney injury  Creatinine 1.97 ON ADMISSION  Start normal saline 125 cc an hour---WITH SWELLING REDUCED TO 75 CC/HR  Repeat BMP in the morning----CREAT IMPROVED; RENAL ULTRASOUND NORMAL (SMALL BENIGN CYST); RESOLVED      Rhabdomyolysis  Continue with IV fluids  Repeat CK level in the morning----IMPROVED QUICKLY WITH HYDRATION; RESOLVED WITH QUICK INTERVENTION OF A CARING NEIGHBOR WHO ALERTED HER SON TO CHECK ON HER WHEN DOWN     Coronary artery disease  Elevated troponin  Trend troponin  EKG shows normal sinus rhythm  Patient denies chest pain  Consult cardiology------------------ECHO WITH GOOD EF%; MITRAL VALVE CALCIFICAITON SEVERE BUT NO CHANGES  NO INTERVENTION AT ALL PLANNED; CARDIOLOGIST AGREES     Diabetes  We will add sliding scale insulin and Accu-Cheks---EXCELLENT HGB A1C WITH NO MEDS  IF BLOOD GLUCOSE STABLE AND I  DISCONTINUED ACCUCHECKS AND SS INSULIN     Hypertension  Blood pressure stable  Resume home meds---STABLE     Hyperlipidemia  Resume statin----WE STOPPED THE STATIN ABOUT A YEAR AGO ; LIPIDS GOOD AND WE ELECTED TO LESSEN THE MEDS SHE IS ON WITH HER CURRENT DECLINING COGNITIVE STATE     DVT prophylaxis:  Medical DVT prophylaxis orders are present.     CODE STATUS:   ADDRESSED WITH FAMILY---DISCUSSED IN DETAIL AND ALL 3 ADULT KIDS AGREE WITH DNR/DNI/COMFORT STATUS; WILL DO AN OUT OF THE HOSPITAL ORDER AS WELL PRIOR TO TRANSFER TO REHAB      UTI---CULTURES POSITIVE ; IV ROCEPHIN ; TOLERATING THIS FINE (HX OF PCN ALLERGY); WILL SWITCH TO ORAL ANTIBIOTICS ON MONDAY TO COMPLETE A 10 DAY COURSE    CANDIDA VAGINOSIS---MONISTAT CREAM X 3 NIGHTS PER VAGINA     I discussed the patient's findings and my recommendations with patient and family.IN DETAIL  Principal Problem:    PATT (acute kidney injury)  Active Problems:    Rhabdomyolysis          Plan for disposition:SKILLED NURSING AND REHAB IN THEN ASSISTED LIVING    Willa Martinez MD  11/05/23  06:49 EST

## 2023-11-05 NOTE — PLAN OF CARE
Problem: Adult Inpatient Plan of Care  Goal: Plan of Care Review  Outcome: Ongoing, Progressing  Goal: Patient-Specific Goal (Individualized)  Outcome: Ongoing, Progressing  Goal: Absence of Hospital-Acquired Illness or Injury  Outcome: Ongoing, Progressing  Intervention: Identify and Manage Fall Risk  Recent Flowsheet Documentation  Taken 11/5/2023 1600 by Olena Bazzi LPN  Safety Promotion/Fall Prevention: safety round/check completed  Intervention: Prevent Skin Injury  Recent Flowsheet Documentation  Taken 11/5/2023 1600 by Olena Bazzi LPN  Body Position:   right   turned  Intervention: Prevent and Manage VTE (Venous Thromboembolism) Risk  Recent Flowsheet Documentation  Taken 11/5/2023 1600 by Olena Bazzi LPN  Activity Management: activity encouraged  Range of Motion: active ROM (range of motion) encouraged  Intervention: Prevent Infection  Recent Flowsheet Documentation  Taken 11/5/2023 1600 by Olena Bazzi LPN  Infection Prevention:   equipment surfaces disinfected   environmental surveillance performed   hand hygiene promoted   rest/sleep promoted   single patient room provided  Goal: Optimal Comfort and Wellbeing  Outcome: Ongoing, Progressing  Intervention: Provide Person-Centered Care  Recent Flowsheet Documentation  Taken 11/5/2023 1600 by Olena Bazzi LPN  Trust Relationship/Rapport:   care explained   empathic listening provided   questions answered   questions encouraged   thoughts/feelings acknowledged  Goal: Readiness for Transition of Care  Outcome: Ongoing, Progressing     Problem: Hypertension Comorbidity  Goal: Blood Pressure in Desired Range  Outcome: Ongoing, Progressing  Intervention: Maintain Blood Pressure Management  Recent Flowsheet Documentation  Taken 11/5/2023 1600 by Olena Bazzi LPN  Medication Review/Management: medications reviewed     Problem: Skin Injury Risk Increased  Goal: Skin Health and Integrity  Outcome: Ongoing, Progressing  Intervention: Optimize Skin  Protection  Recent Flowsheet Documentation  Taken 11/5/2023 1600 by Olena Bazzi LPN  Head of Bed (HOB) Positioning: HOB elevated     Problem: Fall Injury Risk  Goal: Absence of Fall and Fall-Related Injury  Outcome: Ongoing, Progressing  Intervention: Identify and Manage Contributors  Recent Flowsheet Documentation  Taken 11/5/2023 1600 by Olena Bazzi LPN  Medication Review/Management: medications reviewed  Intervention: Promote Injury-Free Environment  Recent Flowsheet Documentation  Taken 11/5/2023 1600 by Olena Bazzi LPN  Safety Promotion/Fall Prevention: safety round/check completed   Goal Outcome Evaluation:

## 2023-11-05 NOTE — PLAN OF CARE
Goal Outcome Evaluation:  Plan of Care Reviewed With: patient        Progress: improving       Patient is pleasantly confused and awaiting approval for rehab. No issues or concerns with this patient except for confusion and wanting to get up.

## 2023-11-06 VITALS
WEIGHT: 161.16 LBS | RESPIRATION RATE: 18 BRPM | TEMPERATURE: 97.9 F | SYSTOLIC BLOOD PRESSURE: 149 MMHG | BODY MASS INDEX: 27.51 KG/M2 | OXYGEN SATURATION: 98 % | DIASTOLIC BLOOD PRESSURE: 73 MMHG | HEART RATE: 81 BPM | HEIGHT: 64 IN

## 2023-11-06 PROBLEM — B37.31 YEAST INFECTION OF THE VAGINA: Status: ACTIVE | Noted: 2023-11-06

## 2023-11-06 PROBLEM — N39.0 ACUTE UTI (URINARY TRACT INFECTION): Status: ACTIVE | Noted: 2023-11-06

## 2023-11-06 PROBLEM — F03.90 DEMENTIA WITHOUT BEHAVIORAL DISTURBANCE: Status: ACTIVE | Noted: 2023-11-06

## 2023-11-06 PROBLEM — Z66 DNR (DO NOT RESUSCITATE): Status: ACTIVE | Noted: 2023-11-06

## 2023-11-06 LAB
ALBUMIN SERPL-MCNC: 2.6 G/DL (ref 3.5–5.2)
ALBUMIN/GLOB SERPL: 0.9 G/DL
ALP SERPL-CCNC: 55 U/L (ref 39–117)
ALT SERPL W P-5'-P-CCNC: 16 U/L (ref 1–33)
ANION GAP SERPL CALCULATED.3IONS-SCNC: 10 MMOL/L (ref 5–15)
AST SERPL-CCNC: 18 U/L (ref 1–32)
BILIRUB SERPL-MCNC: 0.2 MG/DL (ref 0–1.2)
BUN SERPL-MCNC: 17 MG/DL (ref 8–23)
BUN/CREAT SERPL: 28.3 (ref 7–25)
CALCIUM SPEC-SCNC: 8.8 MG/DL (ref 8.6–10.5)
CHLORIDE SERPL-SCNC: 106 MMOL/L (ref 98–107)
CO2 SERPL-SCNC: 23 MMOL/L (ref 22–29)
CREAT SERPL-MCNC: 0.6 MG/DL (ref 0.57–1)
DEPRECATED RDW RBC AUTO: 47.3 FL (ref 37–54)
EGFRCR SERPLBLD CKD-EPI 2021: 87.5 ML/MIN/1.73
ERYTHROCYTE [DISTWIDTH] IN BLOOD BY AUTOMATED COUNT: 14.4 % (ref 12.3–15.4)
GLOBULIN UR ELPH-MCNC: 2.9 GM/DL
GLUCOSE SERPL-MCNC: 100 MG/DL (ref 65–99)
HCT VFR BLD AUTO: 35.1 % (ref 34–46.6)
HGB BLD-MCNC: 11.7 G/DL (ref 12–15.9)
MAGNESIUM SERPL-MCNC: 1.9 MG/DL (ref 1.6–2.4)
MCH RBC QN AUTO: 31.3 PG (ref 26.6–33)
MCHC RBC AUTO-ENTMCNC: 33.3 G/DL (ref 31.5–35.7)
MCV RBC AUTO: 94.1 FL (ref 79–97)
PHOSPHATE SERPL-MCNC: 4 MG/DL (ref 2.5–4.5)
PLATELET # BLD AUTO: 558 10*3/MM3 (ref 140–450)
PMV BLD AUTO: 6.7 FL (ref 6–12)
POTASSIUM SERPL-SCNC: 3.8 MMOL/L (ref 3.5–5.2)
PROT SERPL-MCNC: 5.5 G/DL (ref 6–8.5)
RBC # BLD AUTO: 3.72 10*6/MM3 (ref 3.77–5.28)
SODIUM SERPL-SCNC: 139 MMOL/L (ref 136–145)
WBC NRBC COR # BLD: 6.9 10*3/MM3 (ref 3.4–10.8)

## 2023-11-06 PROCEDURE — 94761 N-INVAS EAR/PLS OXIMETRY MLT: CPT

## 2023-11-06 PROCEDURE — 80053 COMPREHEN METABOLIC PANEL: CPT | Performed by: FAMILY MEDICINE

## 2023-11-06 PROCEDURE — 83735 ASSAY OF MAGNESIUM: CPT | Performed by: FAMILY MEDICINE

## 2023-11-06 PROCEDURE — 25010000002 CEFTRIAXONE PER 250 MG: Performed by: FAMILY MEDICINE

## 2023-11-06 PROCEDURE — 84100 ASSAY OF PHOSPHORUS: CPT | Performed by: FAMILY MEDICINE

## 2023-11-06 PROCEDURE — 85027 COMPLETE CBC AUTOMATED: CPT | Performed by: INTERNAL MEDICINE

## 2023-11-06 PROCEDURE — 94799 UNLISTED PULMONARY SVC/PX: CPT

## 2023-11-06 PROCEDURE — 94664 DEMO&/EVAL PT USE INHALER: CPT

## 2023-11-06 RX ORDER — ACETAMINOPHEN 325 MG/1
650 TABLET ORAL EVERY 4 HOURS PRN
Start: 2023-11-06

## 2023-11-06 RX ORDER — ENOXAPARIN SODIUM 100 MG/ML
40 INJECTION SUBCUTANEOUS EVERY 24 HOURS
Start: 2023-11-06

## 2023-11-06 RX ORDER — NICOTINE POLACRILEX 4 MG
15 LOZENGE BUCCAL
Start: 2023-11-06

## 2023-11-06 RX ORDER — CEFDINIR 300 MG/1
300 CAPSULE ORAL EVERY 12 HOURS SCHEDULED
Qty: 14 CAPSULE | Refills: 0 | Status: SHIPPED | OUTPATIENT
Start: 2023-11-07 | End: 2023-11-14

## 2023-11-06 RX ORDER — NITROGLYCERIN 0.4 MG/1
0.4 TABLET SUBLINGUAL
Start: 2023-11-06

## 2023-11-06 RX ORDER — ONDANSETRON 4 MG/1
4 TABLET, FILM COATED ORAL EVERY 4 HOURS PRN
Start: 2023-11-06

## 2023-11-06 RX ORDER — HYDRALAZINE HYDROCHLORIDE 25 MG/1
25 TABLET, FILM COATED ORAL 2 TIMES DAILY
Start: 2023-11-06

## 2023-11-06 RX ORDER — HYDRALAZINE HYDROCHLORIDE 25 MG/1
25 TABLET, FILM COATED ORAL 2 TIMES DAILY
Status: DISCONTINUED | OUTPATIENT
Start: 2023-11-06 | End: 2023-11-06 | Stop reason: HOSPADM

## 2023-11-06 RX ORDER — DIPHENHYDRAMINE HCL 25 MG
25 CAPSULE ORAL EVERY 4 HOURS PRN
Start: 2023-11-06

## 2023-11-06 RX ORDER — CEFDINIR 300 MG/1
300 CAPSULE ORAL EVERY 12 HOURS SCHEDULED
Status: DISCONTINUED | OUTPATIENT
Start: 2023-11-07 | End: 2023-11-06 | Stop reason: HOSPADM

## 2023-11-06 RX ADMIN — CEFTRIAXONE 1000 MG: 1 INJECTION, POWDER, FOR SOLUTION INTRAMUSCULAR; INTRAVENOUS at 13:09

## 2023-11-06 RX ADMIN — METOPROLOL SUCCINATE 25 MG: 25 TABLET, EXTENDED RELEASE ORAL at 07:56

## 2023-11-06 RX ADMIN — HYDRALAZINE HYDROCHLORIDE 25 MG: 25 TABLET, FILM COATED ORAL at 07:56

## 2023-11-06 RX ADMIN — ISOSORBIDE MONONITRATE 30 MG: 30 TABLET, EXTENDED RELEASE ORAL at 07:57

## 2023-11-06 RX ADMIN — BUDESONIDE 0.5 MG: 0.5 INHALANT RESPIRATORY (INHALATION) at 06:55

## 2023-11-06 RX ADMIN — IPRATROPIUM BROMIDE AND ALBUTEROL SULFATE 3 ML: .5; 3 SOLUTION RESPIRATORY (INHALATION) at 11:25

## 2023-11-06 RX ADMIN — MICONAZOLE NITRATE 1 APPLICATION: 20 CREAM TOPICAL at 07:58

## 2023-11-06 RX ADMIN — IPRATROPIUM BROMIDE AND ALBUTEROL SULFATE 3 ML: .5; 3 SOLUTION RESPIRATORY (INHALATION) at 06:52

## 2023-11-06 RX ADMIN — ASPIRIN 81 MG: 81 TABLET, COATED ORAL at 07:56

## 2023-11-06 RX ADMIN — Medication 10 ML: at 08:03

## 2023-11-06 RX ADMIN — CETIRIZINE HYDROCHLORIDE 10 MG: 10 TABLET, FILM COATED ORAL at 07:57

## 2023-11-06 NOTE — PLAN OF CARE
Goal Outcome Evaluation:  Plan of Care Reviewed With: patient, son        Progress: improving  Outcome Evaluation: Remains risk for falls & skin injury. To have therapy today. Up to [facility] x 2 assist & has also been incont.  Awaiting placement. No new complaints. Pleasant with periods of confusion this AM

## 2023-11-06 NOTE — PROGRESS NOTES
"NEPHROLOGY PROGRESS NOTE------[facility] [place]/[facility]    [facility]  [phone]  Clark Francis MD      Patient Care Team:  Willa Martinez MD as PCP - General  Willa Martinez MD as PCP - [facility]  Amor krause MD as Consulting Physician (Cardiology)  Teddy Francis MD as Consulting Physician (Nephrology)      Provider:  Clark Francis MD  Patient Name: Rowan Guzman  :  1935    SUBJECTIVE:    F/U ARF/PATT    No complaints.  BP up a little    Medication:  aspirin, 81 mg, Oral, Daily  budesonide, 0.5 mg, Nebulization, BID - RT  cefTRIAXone, 1,000 mg, Intravenous, Q24H  cetirizine, 10 mg, Oral, Daily  enoxaparin, 40 mg, Subcutaneous, Q24H  gabapentin, 300 mg, Oral, Nightly  ipratropium-albuterol, 3 mL, Nebulization, TID - RT  isosorbide mononitrate, 30 mg, Oral, Daily  metoprolol succinate XL, 25 mg, Oral, Daily  miconazole, 1 application , Topical, Q12H  montelukast, 10 mg, Oral, Nightly  sodium chloride, 10 mL, Intravenous, Q12H      Pharmacy to Dose enoxaparin (LOVENOX),         OBJECTIVE    Vital Sign Min/Max for last 24 hours  Temp  Min: 97.6 °F (36.4 °C)  Max: 98.2 °F (36.8 °C)   BP  Min: 123/49  Max: 162/85   Pulse  Min: 76  Max: 106   Resp  Min: 14  Max: 18   SpO2  Min: 94 %  Max: 100 %   No data recorded   Weight  Min: 73.1 kg (161 lb 2.5 oz)  Max: 73.1 kg (161 lb 2.5 oz)     Flowsheet Rows      Flowsheet Row First Filed Value   Admission Height 162.6 cm (64\") Documented at 2023 1523   Admission Weight 68 kg (150 lb) Documented at 2023 1523            No intake/output data recorded.  I/O last 3 completed shifts:  In: 240 [P.O.:240]  Out: 150 [Urine:150]    Physical Exam:  General Appearance: alert, appears stated age and cooperative  Head: normocephalic, without obvious abnormality and atraumatic    Eyes: conjunctivae and sclerae normal and no icterus  Neck: supple and no JVD  Lungs: clear to " "auscultation and respirations regular  Heart: regular rhythm & normal rate and normal S1, S2 +AVANI  Chest Wall: no abnormalities observed  Abdomen: normal bowel sounds and soft, nontender  Extremities: moves extremities well, no edema, no cyanosis +DJD  Skin: no bleeding, bruising or rash    Neurologic: Alert, and oriented. No focal deficits    Labs:    WBC WBC   Date Value Ref Range Status   11/06/2023 6.90 3.40 - 10.80 10*3/mm3 Final   11/05/2023 8.70 3.40 - 10.80 10*3/mm3 Final   11/04/2023 7.40 3.40 - 10.80 10*3/mm3 Final      HGB Hemoglobin   Date Value Ref Range Status   11/06/2023 11.7 (L) 12.0 - 15.9 g/dL Final   11/05/2023 12.6 12.0 - 15.9 g/dL Final   11/04/2023 11.7 (L) 12.0 - 15.9 g/dL Final      HCT Hematocrit   Date Value Ref Range Status   11/06/2023 35.1 34.0 - 46.6 % Final   11/05/2023 38.2 34.0 - 46.6 % Final   11/04/2023 35.0 34.0 - 46.6 % Final      Platelets No results found for: \"LABPLAT\"   MCV MCV   Date Value Ref Range Status   11/06/2023 94.1 79.0 - 97.0 fL Final   11/05/2023 93.7 79.0 - 97.0 fL Final   11/04/2023 92.1 79.0 - 97.0 fL Final          Sodium Sodium   Date Value Ref Range Status   11/06/2023 139 136 - 145 mmol/L Final   11/05/2023 141 136 - 145 mmol/L Final   11/04/2023 142 136 - 145 mmol/L Final      Potassium Potassium   Date Value Ref Range Status   11/06/2023 3.8 3.5 - 5.2 mmol/L Final   11/05/2023 4.0 3.5 - 5.2 mmol/L Final     Comment:     Slight hemolysis detected by analyzer. Results may be affected.   11/04/2023 3.8 3.5 - 5.2 mmol/L Final      Chloride Chloride   Date Value Ref Range Status   11/06/2023 106 98 - 107 mmol/L Final   11/05/2023 109 (H) 98 - 107 mmol/L Final   11/04/2023 110 (H) 98 - 107 mmol/L Final      CO2 CO2   Date Value Ref Range Status   11/06/2023 23.0 22.0 - 29.0 mmol/L Final   11/05/2023 22.0 22.0 - 29.0 mmol/L Final   11/04/2023 23.0 22.0 - 29.0 mmol/L Final      BUN BUN   Date Value Ref Range Status   11/06/2023 17 8 - 23 mg/dL Final   11/05/2023 " "20 8 - 23 mg/dL Final   11/04/2023 21 8 - 23 mg/dL Final      Creatinine Creatinine   Date Value Ref Range Status   11/06/2023 0.60 0.57 - 1.00 mg/dL Final   11/05/2023 0.82 0.57 - 1.00 mg/dL Final   11/04/2023 0.84 0.57 - 1.00 mg/dL Final      Calcium Calcium   Date Value Ref Range Status   11/06/2023 8.8 8.6 - 10.5 mg/dL Final   11/05/2023 9.1 8.6 - 10.5 mg/dL Final   11/04/2023 8.8 8.6 - 10.5 mg/dL Final      PO4 No components found for: \"PO4\"   Albumin Albumin   Date Value Ref Range Status   11/06/2023 2.6 (L) 3.5 - 5.2 g/dL Final   11/05/2023 3.0 (L) 3.5 - 5.2 g/dL Final   11/04/2023 2.8 (L) 3.5 - 5.2 g/dL Final      Magnesium Magnesium   Date Value Ref Range Status   11/06/2023 1.9 1.6 - 2.4 mg/dL Final   11/05/2023 1.9 1.6 - 2.4 mg/dL Final   11/04/2023 2.0 1.6 - 2.4 mg/dL Final      Uric Acid No components found for: \"URIC ACID\"     Imaging Results (Last 72 Hours)       Procedure Component Value Units Date/Time    US Renal Bilateral [[id number]] Collected: 11/03/23 0944     Updated: 11/03/23 0947    Narrative:      US RENAL BILATERAL    Date of Exam: 11/3/2023 8:55 AM EDT    Indication: ARF/PATT/CRF/CKD.    Comparison: No comparisons available.    Technique: Grayscale and color Doppler ultrasound evaluation of the kidneys and urinary bladder was performed.      Findings:  The right kidney measures 9.35 in length by 4.63 transversely and the left measures 9.42 cm in length by 5.08 cm transversely. There is no hydronephrosis and there are no solid masses. There is a small left cortical cyst measuring 9 x 9 x 10 mm.      Impression:      Impression:  No obstruction. Small simple cyst in the left kidney noted.    Electronically Signed: Leyda Joseph MD    11/3/2023 9:45 AM EDT    Workstation ID: [id number]            Results for orders placed during the hospital encounter of 11/02/23    XR Knee 3 View Right    Narrative  XR KNEE 3 VW RIGHT, XR KNEE 3 VW LEFT    Date of Exam: 11/2/2023 3:29 PM CDT    Indication: " fall    Comparison: None available.    Findings:  Evaluation of the right knee shows no evidence for acute fracture or acute alignment abnormality. There is advanced lateral compartment joint space narrowing with bone-on-bone changes and osteophyte formation. Patellofemoral joint space narrowing is  noted. There is a suprapatellar joint effusion.    Evaluation of the left knee shows no evidence for acute fracture or acute alignment abnormality. Patellofemoral joint space narrowing is noted. No joint effusion.    Impression  Impression:  No evidence for acute osseous injury to the right or left knee.    Advanced right knee lateral compartment degenerative arthrosis and joint effusion.      Electronically Signed: Karyn Faria MD  11/2/2023 3:54 PM CDT  Workstation ID: [id number]      XR Knee 3 View Left    Narrative  XR KNEE 3 VW RIGHT, XR KNEE 3 VW LEFT    Date of Exam: 11/2/2023 3:29 PM CDT    Indication: fall    Comparison: None available.    Findings:  Evaluation of the right knee shows no evidence for acute fracture or acute alignment abnormality. There is advanced lateral compartment joint space narrowing with bone-on-bone changes and osteophyte formation. Patellofemoral joint space narrowing is  noted. There is a suprapatellar joint effusion.    Evaluation of the left knee shows no evidence for acute fracture or acute alignment abnormality. Patellofemoral joint space narrowing is noted. No joint effusion.    Impression  Impression:  No evidence for acute osseous injury to the right or left knee.    Advanced right knee lateral compartment degenerative arthrosis and joint effusion.      Electronically Signed: Karyn Faria MD  11/2/2023 3:54 PM CDT  Workstation ID: [id number]      XR Chest 2 View    Narrative  XR CHEST 2 VW    Date of Exam: 11/2/2023 3:29 PM CDT    Indication: fall    Comparison: CT chest 5/27/2021    Findings:  The heart is normal in size. The lungs are hyperinflated. No evidence for  pneumothorax or pleural effusion. There is a nodule at the right lung base measuring 7 mm. No acute osseous injury noted.    Impression  Impression:  7 mm nodule at the right lung base. A follow-up noncontrast CT thorax can be obtained for evaluation.      Electronically Signed: Karyn Faria MD  11/2/2023 3:55 PM CDT  Workstation ID: [id number]            ASSESSMENT / PLAN      PATT (acute kidney injury)    Rhabdomyolysis    ARF/PATT/CRF/CKD------Nonoliguric. Appears to have ARF/PATT on top of CRF/CKD STG 2 with a   Baseline serum creatinine of about 0.9. PATT resolved. Stable off of IVFs     2. HYPOCALCEMIA-------Replaced     3. DMII--------BS ok. Glucometers, SSI     4. HTN WITH CKD-----BP up a little. Add little Hydralazine.  Avoid hypotension. No ACE-I/ARB/DRI/diuretic for now     5. CAD/ELEVATED TROPONIN------No angina or gross overload.        6. HYPERLIPIDEMIA----CK and TSH ok     7. OA/DJD------No NSAIDs. Uric ok     8. KETONURIA/DEHYDRATION-----Clinically better.      9. DELIRIUM------Resolved     10. S/P FALL-----Avoid hypotension. Cardiac w/u underway. CK ok     11. DVT PROPHYLAXIS------Lovenox     12. PUD PROPHYLAXIS-----Pepcid    13. E.COLI UTI-------On Rocephin    OKAY FROM RENAL STANDPOINT TO D/C TODAY    Clark Francis MD  [facility]  [phone]  11/06/23  07:05 EST

## 2023-11-06 NOTE — DISCHARGE SUMMARY
DATE/TIME OF ADMISSION:  11/2/2023  3:25 PM  Date of Discharge:  11/6/2023    Discharge Diagnosis: Fall----PT/OT; SKILLED INPT REHAB UPON DISCHARGE HOPEFULLY MONDAY AM PENDING BED AVAILABILITY;  [facility] PREFERRED  Altered mental status  Obtain MRI of the head---CANCELED MRI AS SHE IS AT HER BASELINE FOR DEMENTIA AND CT NOT REMARKABLE EXCEPT FOR AGE RELATED CHANGE     Acute kidney injury  Creatinine 1.97 ON ADMISSION  Start normal saline 125 cc an hour---WITH SWELLING REDUCED TO 75 CC/HR  Repeat BMP in the morning----CREAT IMPROVED; RENAL ULTRASOUND NORMAL (SMALL BENIGN CYST); RESOLVED     Rhabdomyolysis  Continue with IV fluids  Repeat CK level in the morning----IMPROVED QUICKLY WITH HYDRATION; RESOLVED WITH QUICK INTERVENTION OF A CARING NEIGHBOR WHO ALERTED HER SON TO CHECK ON HER WHEN DOWN     Coronary artery disease  Elevated troponin  Trend troponin  EKG shows normal sinus rhythm  Patient denies chest pain  Consult cardiology------------------ECHO WITH GOOD EF%; MITRAL VALVE CALCIFICAITON SEVERE BUT NO CHANGES  NO INTERVENTION AT ALL PLANNED; CARDIOLOGIST AGREES     Diabetes  We will add sliding scale insulin and Accu-Cheks---EXCELLENT HGB A1C WITH NO MEDS  IF BLOOD GLUCOSE STABLE AND I  DISCONTINUED ACCUCHECKS AND SS INSULIN     Hypertension  Blood pressure stable  Resume home meds---STABLE     Hyperlipidemia  Resume statin----WE STOPPED THE STATIN ABOUT A YEAR AGO ; LIPIDS GOOD AND WE ELECTED TO LESSEN THE MEDS SHE IS ON WITH HER CURRENT DECLINING COGNITIVE STATE     DVT prophylaxis:  Medical DVT prophylaxis orders are present.     CODE STATUS:   ADDRESSED WITH FAMILY---DISCUSSED IN DETAIL AND ALL 3 ADULT KIDS AGREE WITH DNR/DNI/COMFORT STATUS; WILL DO AN OUT OF THE HOSPITAL ORDER AS WELL PRIOR TO TRANSFER TO REHAB      UTI---CULTURES POSITIVE ; IV ROCEPHIN ; TOLERATING THIS FINE (HX OF PCN ALLERGY); WILL SWITCH TO ORAL ANTIBIOTICS ON MONDAY TO COMPLETE A 10 DAY COURSE     CANDIDA VAGINOSIS---MONISTAT  CREAM X 3 NIGHTS PER VAGINA     I discussed the patient's findings and my recommendations with patient and family.IN DETAIL    Presenting Problem/History of Present Illness  Active Hospital Problems    Diagnosis  POA    **Dementia without behavioral disturbance [F03.90]  Yes    Acute UTI (urinary tract infection) [N39.0]  Yes    Yeast infection of the vagina [B37.31]  No    DNR (do not resuscitate) [Z66]  No    Rhabdomyolysis [M62.82]  Yes    PATT (acute kidney injury) [N17.9]  Yes      Resolved Hospital Problems   No resolved problems to display.          Hospital Course  Rowan Guzman is a 88 y.o. female who presents with HAVING BEEN FOUND DOWN WITH MILD RHABDOMYOLYSIS. RAPID IMPROVEMENT WITH HYDRATION. WELL CONTROLLED (ON NO MEDS) DM II, ASTHMA, AND LONGSTANDING PROGRESSED DEMENTIA. IT HAS BEEN FELT FOR SOME TIME THAT SHE WAS NOT SAFE IN HER HOME ALONE WITH FAMILY INPUT. NOW IS THE TIME THAT IT IS NOT A NEGOTIABLE ISSUE. SHE REQUIRES AN ASSISTED LIVING ENVIRONMENT LONGTERM AND SKILLED FOR NOW FOR REHAB. FAMILY IN AGREEMENT AND SHE IS NOW ALSO DNI/DNR  SHE WAS FOUND TO HAVE A UTI AND WAS TREATED WITH IV ROCEPHIN AND TOLERATED IT WELL. MILD YEAST VAGINOSIS TREATED AS WELL      Procedures Performed   ECHO         Consults:   Consults       Date and Time Order Name Status Description    11/3/2023  5:30 AM Inpatient Nephrology Consult Completed     11/3/2023  4:22 AM Inpatient Cardiology Consult              Pertinent Test Results:    Lab Results (most recent)       Procedure Component Value Units Date/Time    Comprehensive Metabolic Panel [[id number]]  (Abnormal) Collected: 11/06/23 0301    Specimen: Blood Updated: 11/06/23 0411     Glucose 100 mg/dL      BUN 17 mg/dL      Creatinine 0.60 mg/dL      Sodium 139 mmol/L      Potassium 3.8 mmol/L      Chloride 106 mmol/L      CO2 23.0 mmol/L      Calcium 8.8 mg/dL      Total Protein 5.5 g/dL      Albumin 2.6 g/dL      ALT (SGPT) 16 U/L      AST (SGOT) 18 U/L      Alkaline  Phosphatase 55 U/L      Total Bilirubin 0.2 mg/dL      Globulin 2.9 gm/dL      A/G Ratio 0.9 g/dL      BUN/Creatinine Ratio 28.3     Anion Gap 10.0 mmol/L      eGFR 87.5 mL/min/1.73     Narrative:      GFR Normal >60  Chronic Kidney Disease <60  Kidney Failure <15    The GFR formula is only valid for adults with stable renal function between ages 18 and 70.    Magnesium [[id number]]  (Normal) Collected: 11/06/23 0301    Specimen: Blood Updated: 11/06/23 0411     Magnesium 1.9 mg/dL     Phosphorus [[id number]]  (Normal) Collected: 11/06/23 0301    Specimen: Blood Updated: 11/06/23 0411     Phosphorus 4.0 mg/dL     CBC (No Diff) [[id number]]  (Abnormal) Collected: 11/06/23 0301    Specimen: Blood Updated: 11/06/23 0344     WBC 6.90 10*3/mm3      RBC 3.72 10*6/mm3      Hemoglobin 11.7 g/dL      Hematocrit 35.1 %      MCV 94.1 fL      MCH 31.3 pg      MCHC 33.3 g/dL      RDW 14.4 %      RDW-SD 47.3 fl      MPV 6.7 fL      Platelets 558 10*3/mm3     Urine Culture - Urine, Urine, Clean Catch [[id number]]  (Abnormal)  (Susceptibility) Collected: 11/03/23 1042    Specimen: Urine, Clean Catch Updated: 11/05/23 0554     Urine Culture >100,000 CFU/mL Escherichia coli    Narrative:      Colonization of the urinary tract without infection is common. Treatment is discouraged unless the patient is symptomatic, pregnant, or undergoing an invasive urologic procedure.    Susceptibility        Escherichia coli      VANESSA      Ampicillin Susceptible      Ampicillin + Sulbactam Susceptible      Cefazolin Susceptible      Cefepime Susceptible      Ceftazidime Susceptible      Ceftriaxone Susceptible      Gentamicin Susceptible      Levofloxacin Susceptible      Nitrofurantoin Susceptible      Piperacillin + Tazobactam Susceptible      Trimethoprim + Sulfamethoxazole Susceptible                           Comprehensive Metabolic Panel [[id number]]  (Abnormal) Collected: 11/05/23 0246    Specimen: Blood Updated: 11/05/23 0342     Glucose 102 mg/dL       BUN 20 mg/dL      Creatinine 0.82 mg/dL      Sodium 141 mmol/L      Potassium 4.0 mmol/L      Comment: Slight hemolysis detected by analyzer. Results may be affected.        Chloride 109 mmol/L      CO2 22.0 mmol/L      Calcium 9.1 mg/dL      Total Protein 5.8 g/dL      Albumin 3.0 g/dL      ALT (SGPT) 14 U/L      AST (SGOT) 15 U/L      Alkaline Phosphatase 55 U/L      Total Bilirubin 0.2 mg/dL      Globulin 2.8 gm/dL      A/G Ratio 1.1 g/dL      BUN/Creatinine Ratio 24.4     Anion Gap 10.0 mmol/L      eGFR 69.8 mL/min/1.73     Narrative:      GFR Normal >60  Chronic Kidney Disease <60  Kidney Failure <15    The GFR formula is only valid for adults with stable renal function between ages 18 and 70.    Magnesium [[id number]]  (Normal) Collected: 11/05/23 0246    Specimen: Blood Updated: 11/05/23 0342     Magnesium 1.9 mg/dL     Phosphorus [[id number]]  (Normal) Collected: 11/05/23 0246    Specimen: Blood Updated: 11/05/23 0342     Phosphorus 4.2 mg/dL     CBC (No Diff) [[id number]]  (Abnormal) Collected: 11/05/23 0246    Specimen: Blood Updated: 11/05/23 0323     WBC 8.70 10*3/mm3      RBC 4.08 10*6/mm3      Hemoglobin 12.6 g/dL      Hematocrit 38.2 %      MCV 93.7 fL      MCH 31.0 pg      MCHC 33.1 g/dL      RDW 14.3 %      RDW-SD 46.4 fl      MPV 6.7 fL      Platelets 596 10*3/mm3     CK [[id number]]  (Normal) Collected: 11/04/23 0412    Specimen: Blood Updated: 11/04/23 0455     Creatine Kinase 136 U/L     Calcium, Ionized [[id number]]  (Normal) Collected: 11/04/23 0412    Specimen: Blood Updated: 11/04/23 0442     Ionized Calcium 1.24 mmol/L     CBC Auto Differential [[id number]]  (Abnormal) Collected: 11/04/23 0412    Specimen: Blood Updated: 11/04/23 0432     WBC 7.40 10*3/mm3      RBC 3.80 10*6/mm3      Hemoglobin 11.7 g/dL      Hematocrit 35.0 %      MCV 92.1 fL      MCH 30.9 pg      MCHC 33.6 g/dL      RDW 14.5 %      RDW-SD 49.9 fl      MPV 6.8 fL      Platelets 458 10*3/mm3      Neutrophil % 59.2 %       Lymphocyte % 26.5 %      Monocyte % 10.2 %      Eosinophil % 2.8 %      Basophil % 1.3 %      Neutrophils, Absolute 4.40 10*3/mm3      Lymphocytes, Absolute 2.00 10*3/mm3      Monocytes, Absolute 0.80 10*3/mm3      Eosinophils, Absolute 0.20 10*3/mm3      Basophils, Absolute 0.10 10*3/mm3      nRBC 0.0 /100 WBC     POC Glucose Once [[id number]]  (Abnormal) Collected: 11/03/23 1657    Specimen: Blood Updated: 11/03/23 1700     Glucose 168 mg/dL      Comment: Serial Number: [id number]:  [id number]       Sodium, Urine, Random - Urine, Clean Catch [[id number]] Collected: 11/03/23 1042    Specimen: Urine, Clean Catch Updated: 11/03/23 1425     Sodium, Urine 65 mmol/L     Narrative:      Reference intervals for random urine have not been established.  Clinical usage is dependent upon physician's interpretation in combination with other laboratory tests.       Eosinophil Smear - Urine, Urine, Clean Catch [[id number]]  (Normal) Collected: 11/03/23 1042    Specimen: Urine, Clean Catch Updated: 11/03/23 1218     Eosinophil Smear 0 % EOS/100 Cells     POC Glucose Once [[id number]]  (Abnormal) Collected: 11/03/23 1215    Specimen: Blood Updated: 11/03/23 1216     Glucose 133 mg/dL      Comment: Serial Number: [id number]:  [id number]       Urinalysis, Microscopic Only - Urine, Clean Catch [[id number]]  (Abnormal) Collected: 11/03/23 1042    Specimen: Urine, Clean Catch Updated: 11/03/23 1055     RBC, UA 0-2 /HPF      WBC, UA 11-20 /HPF      Bacteria, UA 4+ /HPF      Squamous Epithelial Cells, UA 0-2 /HPF      Hyaline Casts, UA 0-2 /LPF      Methodology Automated Microscopy    Urinalysis With Culture If Indicated - Urine, Clean Catch [[id number]]  (Abnormal) Collected: 11/03/23 1042    Specimen: Urine, Clean Catch Updated: 11/03/23 1055     Color, UA Yellow     Appearance, UA Clear     pH, UA 5.5     Specific Gravity, UA 1.013     Glucose, UA Negative     Ketones, UA Negative     Bilirubin, UA Negative     Blood, UA  Negative     Protein, UA Negative     Leuk Esterase, UA Negative     Nitrite, UA Positive     Urobilinogen, UA 0.2 E.U./dL    Narrative:      In absence of clinical symptoms, the presence of pyuria, bacteria, and/or nitrites on the urinalysis result does not correlate with infection.    Uric Acid [[id number]]  (Abnormal) Collected: 11/03/23 0626    Specimen: Blood Updated: 11/03/23 1002     Uric Acid 6.1 mg/dL     Hemoglobin A1c [[id number]]  (Abnormal) Collected: 11/03/23 0626    Specimen: Blood Updated: 11/03/23 0941     Hemoglobin A1C 5.80 %     TSH [[id number]]  (Normal) Collected: 11/03/23 0626    Specimen: Blood Updated: 11/03/23 0841     TSH 1.360 uIU/mL     High Sensitivity Troponin T [[id number]]  (Abnormal) Collected: 11/03/23 0626    Specimen: Blood Updated: 11/03/23 0747     HS Troponin T 160 ng/L     Narrative:      High Sensitive Troponin T Reference Range:  <10.0 ng/L- Negative Female for AMI  <15.0 ng/L- Negative Male for AMI  >=10 - Abnormal Female indicating possible myocardial injury.  >=15 - Abnormal Male indicating possible myocardial injury.   Clinicians would have to utilize clinical acumen, EKG, Troponin, and serial changes to determine if it is an Acute Myocardial Infarction or myocardial injury due to an underlying chronic condition.         CK [[id number]]  (Abnormal) Collected: 11/03/23 0626    Specimen: Blood Updated: 11/03/23 0736     Creatine Kinase 261 U/L     Lipid Panel [[id number]]  (Abnormal) Collected: 11/03/23 0626    Specimen: Blood Updated: 11/03/23 0736     Total Cholesterol 191 mg/dL      Triglycerides 67 mg/dL      HDL Cholesterol 63 mg/dL      LDL Cholesterol  116 mg/dL      VLDL Cholesterol 12 mg/dL      LDL/HDL Ratio 1.82    Narrative:      Cholesterol Reference Ranges  (U.S. Department of Health and Human Services ATP III Classifications)    Desirable          <200 mg/dL  Borderline High    200-239 mg/dL  High Risk          >240 mg/dL      Triglyceride Reference Ranges  (U.S.  Department of Health and Human Services ATP III Classifications)    Normal           <150 mg/dL  Borderline High  150-199 mg/dL  High             200-499 mg/dL  Very High        >500 mg/dL    HDL Reference Ranges  (U.S. Department of Health and Human Services ATP III Classifications)    Low     <40 mg/dl (major risk factor for CHD)  High    >60 mg/dl ('negative' risk factor for CHD)        LDL Reference Ranges  (U.S. Department of Health and Human Services ATP III Classifications)    Optimal          <100 mg/dL  Near Optimal     100-129 mg/dL  Borderline High  130-159 mg/dL  High             160-189 mg/dL  Very High        >189 mg/dL    Urinalysis without microscopic (no culture) - Urine, Clean Catch [[id number]]  (Abnormal) Collected: 11/02/23 2055    Specimen: Urine, Clean Catch Updated: 11/02/23 2059     Color, UA Dark Yellow     Appearance, UA Slightly Cloudy     pH, UA <=5.0     Specific Gravity, UA 1.020     Glucose, UA Negative     Ketones, UA 15 mg/dL (1+)     Bilirubin, UA Moderate (2+)     Blood, UA Trace     Protein, UA 30 mg/dL (1+)     Leuk Esterase, UA Small (1+)     Nitrite, UA Negative     Urobilinogen, UA 0.2 E.U./dL    High Sensitivity Troponin T 2Hr [[id number]]  (Abnormal) Collected: 11/02/23 1806    Specimen: Blood from Arm, Left Updated: 11/02/23 1840     HS Troponin T 245 ng/L      Troponin T Delta -93 ng/L     Narrative:      High Sensitive Troponin T Reference Range:  <10.0 ng/L- Negative Female for AMI  <15.0 ng/L- Negative Male for AMI  >=10 - Abnormal Female indicating possible myocardial injury.  >=15 - Abnormal Male indicating possible myocardial injury.   Clinicians would have to utilize clinical acumen, EKG, Troponin, and serial changes to determine if it is an Acute Myocardial Infarction or myocardial injury due to an underlying chronic condition.         CBC & Differential [[id number]]  (Abnormal) Collected: 11/02/23 1550    Specimen: Blood Updated: 11/02/23 1623    Narrative:      The  following orders were created for panel order CBC & Differential.  Procedure                               Abnormality         Status                     ---------                               -----------         ------                     CBC Auto Differential[[id number]]        Abnormal            Final result                 Please view results for these tests on the individual orders.    CBC Auto Differential [[id number]]  (Abnormal) Collected: 11/02/23 1550    Specimen: Blood Updated: 11/02/23 1623     WBC 12.47 10*3/mm3      RBC 4.33 10*6/mm3      Hemoglobin 13.1 g/dL      Hematocrit 40.5 %      MCV 93.5 fL      MCH 30.3 pg      MCHC 32.3 g/dL      RDW 13.6 %      RDW-SD 47.1 fl      MPV 8.7 fL      Platelets 529 10*3/mm3      Neutrophil % 74.5 %      Lymphocyte % 15.0 %      Monocyte % 9.5 %      Eosinophil % 0.3 %      Basophil % 0.2 %      Immature Grans % 0.5 %      Neutrophils, Absolute 9.28 10*3/mm3      Lymphocytes, Absolute 1.87 10*3/mm3      Monocytes, Absolute 1.19 10*3/mm3      Eosinophils, Absolute 0.04 10*3/mm3      Basophils, Absolute 0.03 10*3/mm3      Immature Grans, Absolute 0.06 10*3/mm3     High Sensitivity Troponin T [[id number]]  (Abnormal) Collected: 11/02/23 1550    Specimen: Blood Updated: 11/02/23 1618     HS Troponin T 338 ng/L     Narrative:      High Sensitive Troponin T Reference Range:  <10.0 ng/L- Negative Female for AMI  <15.0 ng/L- Negative Male for AMI  >=10 - Abnormal Female indicating possible myocardial injury.  >=15 - Abnormal Male indicating possible myocardial injury.   Clinicians would have to utilize clinical acumen, EKG, Troponin, and serial changes to determine if it is an Acute Myocardial Infarction or myocardial injury due to an underlying chronic condition.         Lactic Acid, Plasma [[id number]]  (Normal) Collected: 11/02/23 1550    Specimen: Blood Updated: 11/02/23 1616     Lactate 1.6 mmol/L              Results for orders placed during the hospital encounter of  11/02/23    Adult Transthoracic Echo Complete w/ Color, Spectral and Contrast if necessary per protocol    Interpretation Summary    Left ventricular systolic function is normal. Calculated left ventricular EF = 64% Left ventricular ejection fraction appears to be 61 - 65%.    Left ventricular diastolic function is consistent with (grade I) impaired relaxation.    Estimated right ventricular systolic pressure from tricuspid regurgitation is normal (<35 mmHg).    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       OK       Condition on Discharge:  FAIR    Vital Signs  Temp:  [97.6 °F (36.4 °C)-98.2 °F (36.8 °C)] 97.9 °F (36.6 °C)  Heart Rate:  [77-89] 81  Resp:  [17-20] 18  BP: (123-157)/(49-73) 149/73    Physical Exam:          General Appearance:    Alert, cooperative, in no acute distress; CONFUSED   Head:    Normocephalic, without obvious abnormality, atraumatic   Eyes:            Lids and lashes normal, conjunctivae and sclerae normal, no   icterus, no pallor, corneas clear, PERRLA   Ears:    Ears appear intact with no abnormalities noted   Throat:   No oral lesions, no thrush, oral mucosa moist   Neck:   No adenopathy, supple, trachea midline, no thyromegaly, no   carotid bruit, no JVD   Lungs:     Clear to auscultation,respirations regular, even and                  unlabored    Heart:    Regular rhythm and normal rate, normal S1 and S2, no            murmur, no gallop, no rub, no click   Chest Wall:    No abnormalities observed   Abdomen:     Normal bowel sounds, no masses, no organomegaly, soft        non-tender, non-distended, no guarding, no rebound                tenderness   Extremities:   Moves all extremities well, 1+ edema, no cyanosis, no             redness; NO CALF TENDERNESS   Pulses:   Pulses palpable and equal bilaterally   Skin:   No bleeding, bruising or rash   Lymph nodes:   No palpable adenopathy    Neurologic:  O X 1-2 AT BEST; CONFUSED AT HER BASELINE; NEED REDIRECTED; PLEASANT       Discharge Disposition   [facility] ([facility] - External)    Discharge Medications     Discharge Medications        New Medications        Instructions Start Date   acetaminophen 325 MG tablet  Commonly known as: TYLENOL   650 mg, Oral, Every 4 Hours PRN      cefdinir 300 MG capsule  Commonly known as: OMNICEF   300 mg, Oral, Every 12 Hours Scheduled   Start Date: November 7, 2023     dextrose 40 % gel  Commonly known as: GLUTOSE   15 g, Oral, Every 15 Minutes PRN      diphenhydrAMINE 25 mg capsule  Commonly known as: BENADRYL   25 mg, Oral, Every 4 Hours PRN      Enoxaparin Sodium 40 MG/0.4ML solution prefilled syringe syringe  Commonly known as: LOVENOX   40 mg, Subcutaneous, Every 24 Hours      hydrALAZINE 25 MG tablet  Commonly known as: APRESOLINE   25 mg, Oral, 2 Times Daily      miconazole 2 % cream  Commonly known as: MICOTIN   1 application , Topical, Every 12 Hours Scheduled      nitroglycerin 0.4 MG SL tablet  Commonly known as: NITROSTAT   0.4 mg, Sublingual, Every 5 Minutes PRN, Take no more than 3 doses in 15 minutes.      ondansetron 4 MG tablet  Commonly known as: ZOFRAN   4 mg, Oral, Every 4 Hours PRN             Continue These Medications        Instructions Start Date   aspirin 81 MG EC tablet   81 mg, Oral, Daily      budesonide 0.5 MG/2ML nebulizer solution  Commonly known as: PULMICORT   Take 2 mL by nebulization 2 (Two) Times a Day.      cetirizine 10 MG tablet  Commonly known as: zyrTEC   Take 1 tablet by mouth Daily.      gabapentin 300 MG capsule  Commonly known as: NEURONTIN   300 mg, Oral, Every Night at Bedtime      ipratropium-albuterol 0.5-2.5 mg/3 ml nebulizer  Commonly known as: DUO-NEB   Take 3 mL by nebulization 4 (Four) Times a Day.      isosorbide mononitrate 30 MG 24 hr tablet  Commonly known as: IMDUR   TAKE 1 TABLET BY MOUTH EVERY DAY      metoprolol succinate XL 25  MG 24 hr tablet  Commonly known as: TOPROL-XL   25 mg, Oral, Daily      montelukast 10 MG tablet  Commonly known as: SINGULAIR   10 mg, Oral, Nightly               Discharge Diet: CONSISTENT CARB    Activity at Discharge: UP WITH ASSISTANCE; WALKER USE; PT/OT    Follow-up Appointments  No future appointments.      Test Results Pending at Discharge   NONE       Willa Martinez MD  11/06/23  13:49 EST

## 2023-11-06 NOTE — PLAN OF CARE
Goal Outcome Evaluation:  Plan of Care Reviewed With: patient        Progress: improving       Patient is feeling good this shift and labs are improving. Awaiting authorization for patient to go to [facility] for rehab, could possibly go today. Patient is pleasantly confused and did try several times to get out of bed to get her breathing treatment she advised, even though she had already received it earlier. Patient did have some itching and cream was applied along with a benadryl which seem to work well for her.

## 2023-11-06 NOTE — PROGRESS NOTES
"DATE/TIME OF ADMISSION:  11/2/2023  3:25 PM     LOS: 3 days   Patient Care Team:  Willa Martinez MD as PCP - General  Willa Martinez MD as PCP - Family Medicine  Amor López MD as Consulting Physician (Cardiology)  Teddy Francis MD as Consulting Physician (Nephrology)  Consults       Date and Time Order Name Status Description    11/3/2023  5:30 AM Inpatient Nephrology Consult Completed     11/3/2023  4:22 AM Inpatient Cardiology Consult              Subjective AT BASELINE FOR PLEASANTLY CONFUSED, O X 1-2 AT BEST    Interval History: FALLING, UTI, PROGRESSING DEMENTIA    Patient Complaints: VAGINAL ITCHING IMPROVED (YEAST)    History taken from: patient    Review of Systems        Objective     Vital Signs  /70 (BP Location: Right arm, Patient Position: Lying)   Pulse 89   Temp 98.1 °F (36.7 °C) (Oral)   Resp 18   Ht 162.6 cm (64\")   Wt 70.3 kg (155 lb)   SpO2 96%   BMI 26.61 kg/m²     Physical Exam:         PENDING EVALUATION     General Appearance:    Alert, cooperative, in no acute distress   Head:    Normocephalic, without obvious abnormality, atraumatic   Eyes:            Lids and lashes normal, conjunctivae and sclerae normal, no   icterus, no pallor, corneas clear, PERRLA   Ears:    Ears appear intact with no abnormalities noted   Throat:   No oral lesions, no thrush, oral mucosa moist   Neck:   No adenopathy, supple, trachea midline, no thyromegaly, no   carotid bruit, no JVD   Lungs:     Clear to auscultation,respirations regular, even and                  unlabored    Heart:    Regular rhythm and normal rate, normal S1 and S2, no            murmur, no gallop, no rub, no click   Chest Wall:    No abnormalities observed   Abdomen:     Normal bowel sounds, no masses, no organomegaly, soft        non-tender, non-distended, no guarding, no rebound                tenderness   Extremities:   Moves all extremities well, no edema, no cyanosis, no             redness "   Pulses:   Pulses palpable and equal bilaterally   Skin:   No bleeding, bruising or rash   Lymph nodes:   No palpable adenopathy   Neurologic:   Grossly normal, alert and O x 3        I HAVE PERSONALLY EXAMINED AND REVIEWED THE PATIENT'S RECORD     Lab Results (last 48 hours)       Procedure Component Value Units Date/Time    Comprehensive Metabolic Panel [[id number]]  (Abnormal) Collected: 11/06/23 0301    Specimen: Blood Updated: 11/06/23 0411     Glucose 100 mg/dL      BUN 17 mg/dL      Creatinine 0.60 mg/dL      Sodium 139 mmol/L      Potassium 3.8 mmol/L      Chloride 106 mmol/L      CO2 23.0 mmol/L      Calcium 8.8 mg/dL      Total Protein 5.5 g/dL      Albumin 2.6 g/dL      ALT (SGPT) 16 U/L      AST (SGOT) 18 U/L      Alkaline Phosphatase 55 U/L      Total Bilirubin 0.2 mg/dL      Globulin 2.9 gm/dL      A/G Ratio 0.9 g/dL      BUN/Creatinine Ratio 28.3     Anion Gap 10.0 mmol/L      eGFR 87.5 mL/min/1.73     Narrative:      GFR Normal >60  Chronic Kidney Disease <60  Kidney Failure <15    The GFR formula is only valid for adults with stable renal function between ages 18 and 70.    Magnesium [[id number]]  (Normal) Collected: 11/06/23 0301    Specimen: Blood Updated: 11/06/23 0411     Magnesium 1.9 mg/dL     Phosphorus [[id number]]  (Normal) Collected: 11/06/23 0301    Specimen: Blood Updated: 11/06/23 0411     Phosphorus 4.0 mg/dL     CBC (No Diff) [[id number]]  (Abnormal) Collected: 11/06/23 0301    Specimen: Blood Updated: 11/06/23 0344     WBC 6.90 10*3/mm3      RBC 3.72 10*6/mm3      Hemoglobin 11.7 g/dL      Hematocrit 35.1 %      MCV 94.1 fL      MCH 31.3 pg      MCHC 33.3 g/dL      RDW 14.4 %      RDW-SD 47.3 fl      MPV 6.7 fL      Platelets 558 10*3/mm3     Urine Culture - Urine, Urine, Clean Catch [[id number]]  (Abnormal)  (Susceptibility) Collected: 11/03/23 1042    Specimen: Urine, Clean Catch Updated: 11/05/23 0554     Urine Culture >100,000 CFU/mL Escherichia coli    Narrative:      Colonization  of the urinary tract without infection is common. Treatment is discouraged unless the patient is symptomatic, pregnant, or undergoing an invasive urologic procedure.    Susceptibility        Escherichia coli      VANESSA      Ampicillin Susceptible      Ampicillin + Sulbactam Susceptible      Cefazolin Susceptible      Cefepime Susceptible      Ceftazidime Susceptible      Ceftriaxone Susceptible      Gentamicin Susceptible      Levofloxacin Susceptible      Nitrofurantoin Susceptible      Piperacillin + Tazobactam Susceptible      Trimethoprim + Sulfamethoxazole Susceptible                           Comprehensive Metabolic Panel [[id number]]  (Abnormal) Collected: 11/05/23 0246    Specimen: Blood Updated: 11/05/23 0342     Glucose 102 mg/dL      BUN 20 mg/dL      Creatinine 0.82 mg/dL      Sodium 141 mmol/L      Potassium 4.0 mmol/L      Comment: Slight hemolysis detected by analyzer. Results may be affected.        Chloride 109 mmol/L      CO2 22.0 mmol/L      Calcium 9.1 mg/dL      Total Protein 5.8 g/dL      Albumin 3.0 g/dL      ALT (SGPT) 14 U/L      AST (SGOT) 15 U/L      Alkaline Phosphatase 55 U/L      Total Bilirubin 0.2 mg/dL      Globulin 2.8 gm/dL      A/G Ratio 1.1 g/dL      BUN/Creatinine Ratio 24.4     Anion Gap 10.0 mmol/L      eGFR 69.8 mL/min/1.73     Narrative:      GFR Normal >60  Chronic Kidney Disease <60  Kidney Failure <15    The GFR formula is only valid for adults with stable renal function between ages 18 and 70.    Magnesium [[id number]]  (Normal) Collected: 11/05/23 0246    Specimen: Blood Updated: 11/05/23 0342     Magnesium 1.9 mg/dL     Phosphorus [[id number]]  (Normal) Collected: 11/05/23 0246    Specimen: Blood Updated: 11/05/23 0342     Phosphorus 4.2 mg/dL     CBC (No Diff) [[id number]]  (Abnormal) Collected: 11/05/23 0246    Specimen: Blood Updated: 11/05/23 0323     WBC 8.70 10*3/mm3      RBC 4.08 10*6/mm3      Hemoglobin 12.6 g/dL      Hematocrit 38.2 %      MCV 93.7 fL      MCH 31.0  pg      MCHC 33.1 g/dL      RDW 14.3 %      RDW-SD 46.4 fl      MPV 6.7 fL      Platelets 596 10*3/mm3              Imaging Results (Last 48 Hours)       ** No results found for the last 48 hours. **                 I reviewed the patient's new clinical results.    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         Medication Review:   Scheduled Meds:aspirin, 81 mg, Oral, Daily  budesonide, 0.5 mg, Nebulization, BID - RT  cefTRIAXone, 1,000 mg, Intravenous, Q24H  cetirizine, 10 mg, Oral, Daily  enoxaparin, 40 mg, Subcutaneous, Q24H  gabapentin, 300 mg, Oral, Nightly  ipratropium-albuterol, 3 mL, Nebulization, TID - RT  isosorbide mononitrate, 30 mg, Oral, Daily  metoprolol succinate XL, 25 mg, Oral, Daily  miconazole, 1 application , Topical, Q12H  montelukast, 10 mg, Oral, Nightly  sodium chloride, 10 mL, Intravenous, Q12H      Continuous Infusions:Pharmacy to Dose enoxaparin (LOVENOX),       PRN Meds:.  acetaminophen    aluminum-magnesium hydroxide-simethicone    calcium carbonate    dextrose    dextrose    diphenhydrAMINE    glucagon (human recombinant)    nitroglycerin    ondansetron **OR** ondansetron    Pharmacy to Dose enoxaparin (LOVENOX)    [COMPLETED] Insert peripheral IV **AND** sodium chloride    sodium chloride    sodium chloride    sodium chloride    sodium chloride     Assessment & Plan   Fall----PT/OT; SKILLED INPT REHAB UPON DISCHARGE HOPEFULLY MONDAY AM PENDING BED AVAILABILITY; HOPING FOR [facility]  Altered  mental status  Obtain MRI of the head---CANCELED MRI AS SHE IS AT HER BASELINE FOR DEMENTIA AND CT NOT REMARKABLE EXCEPT FOR AGE RELATED CHANGE     Acute kidney injury  Creatinine 1.97 ON ADMISSION  Start normal saline 125 cc an hour---WITH SWELLING REDUCED TO 75 CC/HR  Repeat BMP in the morning----CREAT IMPROVED; RENAL ULTRASOUND NORMAL (SMALL BENIGN CYST); RESOLVED     Rhabdomyolysis  Continue with IV fluids  Repeat CK level in the morning----IMPROVED QUICKLY WITH HYDRATION; RESOLVED WITH QUICK INTERVENTION OF A CARING NEIGHBOR WHO ALERTED HER SON TO CHECK ON HER WHEN DOWN     Coronary artery disease  Elevated troponin  Trend troponin  EKG shows normal sinus rhythm  Patient denies chest pain  Consult cardiology------------------ECHO WITH GOOD EF%; MITRAL VALVE CALCIFICAITON SEVERE BUT NO CHANGES  NO INTERVENTION AT ALL PLANNED; CARDIOLOGIST AGREES     Diabetes  We will add sliding scale insulin and Accu-Cheks---EXCELLENT HGB A1C WITH NO MEDS  IF BLOOD GLUCOSE STABLE AND I  DISCONTINUED ACCUCHECKS AND SS INSULIN     Hypertension  Blood pressure stable  Resume home meds---STABLE     Hyperlipidemia  Resume statin----WE STOPPED THE STATIN ABOUT A YEAR AGO ; LIPIDS GOOD AND WE ELECTED TO LESSEN THE MEDS SHE IS ON WITH HER CURRENT DECLINING COGNITIVE STATE     DVT prophylaxis:  Medical DVT prophylaxis orders are present.     CODE STATUS:   ADDRESSED WITH FAMILY---DISCUSSED IN DETAIL AND ALL 3 ADULT KIDS AGREE WITH DNR/DNI/COMFORT STATUS; WILL DO AN OUT OF THE HOSPITAL ORDER AS WELL PRIOR TO TRANSFER TO REHAB      UTI---CULTURES POSITIVE ; IV ROCEPHIN ; TOLERATING THIS FINE (HX OF PCN ALLERGY); WILL SWITCH TO ORAL ANTIBIOTICS ON MONDAY TO COMPLETE A 10 DAY COURSE     CANDIDA VAGINOSIS---MONISTAT CREAM X 3 NIGHTS PER VAGINA     I discussed the patient's findings and my recommendations with patient and family.IN DETAIL  Principal Problem:    PATT (acute kidney injury)  Active Problems:    Rhabdomyolysis          Plan for  disposition:TO [facility] FOR SKILLED THEN ASSISTED LIVING LONGTERM    Willa Martinez MD  11/06/23  05:51 EST

## 2023-11-07 NOTE — CASE MANAGEMENT/SOCIAL WORK
Case Management Discharge Note      Final Note: Glenna Alejandre         Selected Continued Care - Discharged on 11/6/2023 Admission date: 11/2/2023 - Discharge disposition: Skilled Nursing Facility (DC - External)      Destination Coordination complete.      Service Provider Selected Services Address Phone Fax Patient Preferred    [facility]  [address] [place] [id number] [phone] --           Transportation Services  W/C Van: Skilled Nursing Facility Van    Final Discharge Disposition Code: 03 - skilled nursing facility (SNF)

## 2023-11-11 LAB
QT INTERVAL: 398 MS
QTC INTERVAL: 461 MS